# Patient Record
Sex: FEMALE | Race: WHITE
[De-identification: names, ages, dates, MRNs, and addresses within clinical notes are randomized per-mention and may not be internally consistent; named-entity substitution may affect disease eponyms.]

---

## 2017-01-20 ENCOUNTER — HOSPITAL ENCOUNTER (INPATIENT)
Dept: HOSPITAL 33 - ED | Age: 57
LOS: 5 days | Discharge: HOME | DRG: 193 | End: 2017-01-25
Attending: FAMILY MEDICINE | Admitting: FAMILY MEDICINE
Payer: MEDICAID

## 2017-01-20 DIAGNOSIS — F41.9: ICD-10-CM

## 2017-01-20 DIAGNOSIS — M19.90: ICD-10-CM

## 2017-01-20 DIAGNOSIS — J18.9: Primary | ICD-10-CM

## 2017-01-20 DIAGNOSIS — J96.21: ICD-10-CM

## 2017-01-20 DIAGNOSIS — D64.9: ICD-10-CM

## 2017-01-20 DIAGNOSIS — G40.909: ICD-10-CM

## 2017-01-20 DIAGNOSIS — J44.1: ICD-10-CM

## 2017-01-20 DIAGNOSIS — Z90.49: ICD-10-CM

## 2017-01-20 DIAGNOSIS — J90: ICD-10-CM

## 2017-01-20 DIAGNOSIS — N28.9: ICD-10-CM

## 2017-01-20 DIAGNOSIS — E66.01: ICD-10-CM

## 2017-01-20 DIAGNOSIS — Z79.899: ICD-10-CM

## 2017-01-20 DIAGNOSIS — F32.9: ICD-10-CM

## 2017-01-20 DIAGNOSIS — I10: ICD-10-CM

## 2017-01-20 LAB
ALBUMIN SERPL-MCNC: 3.2 G/DL (ref 3.4–5)
ALP SERPL-CCNC: 130 U/L (ref 46–116)
ALT SERPL-CCNC: 21 U/L (ref 12–78)
ANION GAP SERPL CALC-SCNC: 6.6 MEQ/L (ref 5–15)
APTT PPP: 31.8 SECONDS (ref 25.3–37)
ARTERIAL PATENCY WRIST A: YES
AST SERPL QL: 17 U/L (ref 15–37)
BASE EXCESS BLDA CALC-SCNC: 9 MMOL/L (ref -2–2)
BILIRUB BLD-MCNC: 0.4 MG/DL (ref 0.2–1)
BUN SERPL-MCNC: 8 MG/DL (ref 9–20)
CELLS COUNTED: 100
CHLORIDE SERPL-SCNC: 98 MEQ/L (ref 98–107)
CO2 SERPL-SCNC: 34.3 MEQ/L (ref 21–32)
GLUCOSE SERPL-MCNC: 150 MG/DL (ref 70–110)
INHALED O2 CONCENTRATION: 50 %
INR PPP: 0.98 (ref 0.8–3)
MCH RBC QN AUTO: 29.7 PG (ref 26–32)
O2 A-A PPRESDIFF RESPIRATORY: 212 MM[HG]
PLATELET # BLD AUTO: 395 K/MM3 (ref 150–450)
PO2 BLDA: 66 MMHG (ref 75–100)
POTASSIUM SERPLBLD-SCNC: 3.2 MEQ/L (ref 3.5–5.1)
PROT SERPL-MCNC: 7.8 GM/DL (ref 6.4–8.2)
PROTHROMBIN TIME: 11 SECONDS (ref 9.95–12.35)
RBC # BLD AUTO: 4 M/MM3 (ref 4.1–5.4)
SAO2 % BLDA: 93.9 % (ref 95–100)
SODIUM SERPL-SCNC: 136 MEQ/L (ref 136–145)
TROPONIN T SERPL HS-MCNC: < 0.017 NG/ML (ref 0–0.06)
WBC # BLD AUTO: 16.2 K/MM3 (ref 4–10.5)

## 2017-01-20 PROCEDURE — 71260 CT THORAX DX C+: CPT

## 2017-01-20 PROCEDURE — 85379 FIBRIN DEGRADATION QUANT: CPT

## 2017-01-20 PROCEDURE — 71010: CPT

## 2017-01-20 PROCEDURE — 80048 BASIC METABOLIC PNL TOTAL CA: CPT

## 2017-01-20 PROCEDURE — 83605 ASSAY OF LACTIC ACID: CPT

## 2017-01-20 PROCEDURE — 36000 PLACE NEEDLE IN VEIN: CPT

## 2017-01-20 PROCEDURE — 99284 EMERGENCY DEPT VISIT MOD MDM: CPT

## 2017-01-20 PROCEDURE — 96360 HYDRATION IV INFUSION INIT: CPT

## 2017-01-20 PROCEDURE — 36415 COLL VENOUS BLD VENIPUNCTURE: CPT

## 2017-01-20 PROCEDURE — 83880 ASSAY OF NATRIURETIC PEPTIDE: CPT

## 2017-01-20 PROCEDURE — 36600 WITHDRAWAL OF ARTERIAL BLOOD: CPT

## 2017-01-20 PROCEDURE — 82803 BLOOD GASES ANY COMBINATION: CPT

## 2017-01-20 PROCEDURE — 83036 HEMOGLOBIN GLYCOSYLATED A1C: CPT

## 2017-01-20 PROCEDURE — 85025 COMPLETE CBC W/AUTO DIFF WBC: CPT

## 2017-01-20 PROCEDURE — 96365 THER/PROPH/DIAG IV INF INIT: CPT

## 2017-01-20 PROCEDURE — 87040 BLOOD CULTURE FOR BACTERIA: CPT

## 2017-01-20 PROCEDURE — 96374 THER/PROPH/DIAG INJ IV PUSH: CPT

## 2017-01-20 PROCEDURE — 85610 PROTHROMBIN TIME: CPT

## 2017-01-20 PROCEDURE — 93005 ELECTROCARDIOGRAM TRACING: CPT

## 2017-01-20 PROCEDURE — 85730 THROMBOPLASTIN TIME PARTIAL: CPT

## 2017-01-20 PROCEDURE — 84484 ASSAY OF TROPONIN QUANT: CPT

## 2017-01-20 PROCEDURE — 94620: CPT

## 2017-01-20 PROCEDURE — 94760 N-INVAS EAR/PLS OXIMETRY 1: CPT

## 2017-01-20 PROCEDURE — 80053 COMPREHEN METABOLIC PANEL: CPT

## 2017-01-20 PROCEDURE — 94640 AIRWAY INHALATION TREATMENT: CPT

## 2017-01-20 PROCEDURE — 82375 ASSAY CARBOXYHB QUANT: CPT

## 2017-01-20 PROCEDURE — 85027 COMPLETE CBC AUTOMATED: CPT

## 2017-01-20 PROCEDURE — 82962 GLUCOSE BLOOD TEST: CPT

## 2017-01-20 PROCEDURE — 93306 TTE W/DOPPLER COMPLETE: CPT

## 2017-01-20 RX ADMIN — ALPRAZOLAM SCH MG: 1 TABLET ORAL at 23:28

## 2017-01-20 NOTE — ERPHSYRPT
- History of Present Illness


Source: patient, family


Timing/Duration: day(s) (few)


Severity: severe


Hx Tetanus, Diphtheria Vaccination/Date Given: Yes


Hx Influenza Vaccination/Date Given: Yes


Hx Pneumococcal Vaccination/Date Given: No





<MONSE LAMBERT - Last Filed: 01/20/17 19:21>





<BETHERI TERRYSH - Last Filed: 01/20/17 20:11>





- History of Present Illness


Time Seen by Provider: 01/20/17 18:12


Physician History: 





CC: short of air


Hx: 57 y/o patient of dr Vijay Wong and JA Palacios. She has couple day hx of 

increased dyspnea. Some cough. No fever or chills. She has hx of COPD. No hx of 

thromboembolic disease. No hx of CA or recent travel. Denies chest pain. 

Dyspnea worsened so came to ER. She used to use oxygen at home but no longer. 

She used neb PTA.


 (MONSE LAMBERT)


Allergies/Adverse Reactions: 








No Known Drug Allergies Allergy (Verified 10/06/16 09:43)


 





Home Medications: 








Escitalopram Oxalate 10 mg** [Lexapro 10 MG**] 10 mg PO DAILY 12/17/15 [History]


Gabapentin 300 mg PO BID 12/17/15 [History]


Simethicone 80 mg*** [Mylicon 80MG***] 80 mg PO QID 12/17/15 [History]


Amlodipine Besylate 10 mg [Norvasc 10 MG] 10 mg PO DAILY 07/13/16 [History]


Budesonide/Formoterol Fumarate [Symbicort 160-4.5 Mcg Inhaler] 6 gm IH BID 07/13 /16 [History]


Ergocalciferol (Vitamin D2) [Vitamin D] 50,000 unit PO DAILY 07/13/16 [History]


Ferrous Sulfate 325 mg PO BID 07/13/16 [History]


Furosemide [Lasix] 40 mg PO DAILY 07/13/16 [History]


Lisinopril [Zestril] 40 mg PO DAILY 07/13/16 [History]


Omeprazole 20 MG [Prilosec 20 mg] 20 mg PO DAILY 07/13/16 [History]








- Review of Systems


Constitutional: Fatigue, Malaise, Weakness, No Fever, No Chills


Eyes: No Symptoms


Ears, Nose, & Throat: No Symptoms


Respiratory: Cough, Dyspnea, Dyspnea on Exertion (EARLY)


Cardiac: No Chest Pain, No Edema


Abdominal/Gastrointestinal: No Abdominal Pain, No Nausea, No Vomiting


Genitourinary Symptoms: No Dysuria


Musculoskeletal: No Back Pain


Skin: No Rash


Neurological: No Headache


All Other Systems: Reviewed and Negative





<MONSE LAMBERT Last Filed: 01/20/17 19:21>





- Past Medical History


Pertinent Past Medical History: Yes


Neurological History: Seizures


ENT History: No Pertinent History


Cardiac History: No Pertinent History, Hypertension


Respiratory History: COPD


Endocrine Medical History: No Pertinent History


Musculoskeletal History: Osteoarthritis


GI Medical History: Gallbladder Disease, Hernia, Other


 History: Renal Disease


Psycho-Social History: Anxiety, Depression


Female Reproductive Disorders: No Pertinent History


Other Medical History: PERF BOWEL 4/2014. pt states she had a seizure because 

she ran out of xanax, pt states she does not remember it, [facility].  Pt states that her kidneys leak protein.





- Past Surgical History


Past Surgical History: Yes


Neuro Surgical History: No Pertinent History


Cardiac: No Pertinent History


Respiratory: No Pertinent History


Gastrointestinal: Cholecystectomy, Colon Resection


Genitourinary: No Pertinent History


Musculoskeletal: No Pertinent History


Female Surgical History: No Pertinent History





- Social History


Smoking Status: Former smoker


How long have you smoked: 30


Exposure to second hand smoke: No


Drug Use: none


Patient Lives Alone: No





- Female History


Hx Pregnant Now: No





<LAMBERTMONSE Last Filed: 01/20/17 19:21>





- Physical Exam


General Appearance: alert, obese, other (dyspneic)


Eye Exam: PERRL/EOMI


Ears, Nose, Throat Exam: normal ENT inspection, moist mucous membranes


Neck Exam: normal inspection, non-tender, supple


Respiratory Exam: diminished breath sounds, wheezing (rare)


Cardiovascular Exam: regular rate/rhythm, No murmur


Gastrointestinal/Abdomen Exam: soft, other (ventral hernia), No tenderness, No 

distention


Back Exam: normal inspection


Extremity Exam: normal inspection, normal range of motion, No calf tenderness, 

No pedal edema


Neurologic Exam: alert, oriented x 3, cooperative


Skin Exam: warm, dry, No rash


**SpO2 Interpretation**: hypoxic, ABG ordered, O2 applied


SpO2: 77


Oxygen Delivery: Room Air





<LAMBERTMONSEFLEX - Last Filed: 01/20/17 19:21>





- Course


Nursing assessment & vital signs reviewed: Yes


EKG Interpreted by Me: RATE (75), Sinus Rhythm, NORMAL AXIS, NORMAL INTERVALS (

QTc 331), Non-specific ST Changes





<MONSE LAMBERT - Last Filed: 01/20/17 19:21>





<MONSE LAMBERT - Last Filed: 01/20/17 19:21>





- Progress


Progress: improved


Discussed with .: Alonso


Will see patient in: hospital (observation)


Counseled pt/family regarding: lab results, diagnosis, need for follow-up, rad 

results





<ELIF CAMPOS - Last Filed: 01/20/17 20:11>





- Progress


Progress Note: 





01/20/17 19:21


Pt improved. She is weaning oxygen. She will have CTA to rule out PE. Cultures 

sent. Nebs, steroids and abtx given. Reported to Dr Campos for further care 

and disposition. (MONSE LAMBERT)





<MONSE LAMBERT - Last Filed: 01/20/17 19:21>





- Departure


Time of Disposition: 20:09


Departure Disposition: In-patient Admission


Critical Care Time: Yes


Critical Care Time(excluding separately billable procedures): 30-74 minutes





<ELIF CAMPOS - Last Filed: 01/20/17 20:11>





- Departure


Clinical Impression: 


 Acute exacerbation of chronic obstructive airways disease, Acute on chronic 

respiratory failure with hypoxemia





Pneumonia


Qualifiers:


 Pneumonia type: due to other aerobic Gram-negative bacteria Laterality: 

bilateral Lung location: lower lobe of lung Qualified Code(s): J15.6 - 

Pneumonia due to other aerobic Gram-negative bacteria





Condition: Fair


Referrals: 


DOMINICK WONG [Primary Care Provider] - 


Instructions:  Chronic Obstructive Pulmonary Disease

## 2017-01-20 NOTE — XRAY
Indication: Dyspnea.



Comparison: April 24, 2014



Portable chest again markedly limited by patient body habitus.  Lungs are also

less inflated accentuating the cardiopulmonary structures.  Diffuse bilateral

hazy opacities either infiltrates versus atelectasis that should be correlated

clinically.  No pneumothorax.  CT may yield further information if clinically

warranted.

## 2017-01-21 RX ADMIN — WATER SCH MG: 1 INJECTION INTRAMUSCULAR; INTRAVENOUS; SUBCUTANEOUS at 16:11

## 2017-01-21 RX ADMIN — IPRATROPIUM BROMIDE AND ALBUTEROL SULFATE SCH ML: .5; 3 SOLUTION RESPIRATORY (INHALATION) at 19:10

## 2017-01-21 RX ADMIN — HYDROCHLOROTHIAZIDE SCH MG: 25 TABLET ORAL at 16:10

## 2017-01-21 RX ADMIN — FLUTICASONE PROPIONATE AND SALMETEROL XINAFOATE SCH PUFF: 230; 21 AEROSOL, METERED RESPIRATORY (INHALATION) at 19:11

## 2017-01-21 RX ADMIN — WATER SCH MG: 1 INJECTION INTRAMUSCULAR; INTRAVENOUS; SUBCUTANEOUS at 10:10

## 2017-01-21 RX ADMIN — FERROUS SULFATE TAB 325 MG (65 MG ELEMENTAL FE) SCH MG: 325 (65 FE) TAB at 21:34

## 2017-01-21 RX ADMIN — IPRATROPIUM BROMIDE AND ALBUTEROL SULFATE SCH ML: .5; 3 SOLUTION RESPIRATORY (INHALATION) at 14:32

## 2017-01-21 RX ADMIN — GABAPENTIN SCH MG: 300 CAPSULE ORAL at 21:35

## 2017-01-21 RX ADMIN — IPRATROPIUM BROMIDE AND ALBUTEROL SULFATE SCH ML: .5; 3 SOLUTION RESPIRATORY (INHALATION) at 01:28

## 2017-01-21 RX ADMIN — FLUTICASONE PROPIONATE AND SALMETEROL XINAFOATE SCH PUFF: 230; 21 AEROSOL, METERED RESPIRATORY (INHALATION) at 07:08

## 2017-01-21 RX ADMIN — ALPRAZOLAM SCH MG: 1 TABLET ORAL at 13:26

## 2017-01-21 RX ADMIN — ZOLPIDEM TARTRATE PRN MG: 5 TABLET ORAL at 21:36

## 2017-01-21 RX ADMIN — CEFTRIAXONE SCH MLS/HR: 1 INJECTION, SOLUTION INTRAVENOUS at 21:35

## 2017-01-21 RX ADMIN — ALPRAZOLAM SCH MG: 1 TABLET ORAL at 21:35

## 2017-01-21 RX ADMIN — GABAPENTIN SCH MG: 300 CAPSULE ORAL at 16:10

## 2017-01-21 RX ADMIN — SIMETHICONE SCH MG: 80 TABLET, CHEWABLE ORAL at 21:34

## 2017-01-21 RX ADMIN — ACETAMINOPHEN PRN MG: 325 TABLET ORAL at 21:35

## 2017-01-21 RX ADMIN — SIMETHICONE SCH MG: 80 TABLET, CHEWABLE ORAL at 16:10

## 2017-01-21 RX ADMIN — AMLODIPINE BESYLATE SCH MG: 5 TABLET ORAL at 16:10

## 2017-01-21 RX ADMIN — ALPRAZOLAM SCH MG: 1 TABLET ORAL at 09:44

## 2017-01-21 RX ADMIN — ESCITALOPRAM OXALATE SCH MG: 10 TABLET ORAL at 16:10

## 2017-01-21 RX ADMIN — ENOXAPARIN SODIUM SCH MG: 100 INJECTION SUBCUTANEOUS at 10:10

## 2017-01-21 RX ADMIN — ACETAMINOPHEN PRN MG: 325 TABLET ORAL at 10:10

## 2017-01-21 RX ADMIN — IPRATROPIUM BROMIDE AND ALBUTEROL SULFATE SCH ML: .5; 3 SOLUTION RESPIRATORY (INHALATION) at 07:07

## 2017-01-21 RX ADMIN — AZITHROMYCIN DIHYDRATE SCH MLS/HR: 500 INJECTION, POWDER, LYOPHILIZED, FOR SOLUTION INTRAVENOUS at 22:12

## 2017-01-21 NOTE — XRAY
Indication: Short of breath, cough, and elevated WBC.  Elevated d-dimer.



Multiple contiguous axial images obtained through the chest using 80 cc

Isovue-370 contrast and PE protocol.



Comparison: Conventional CT chest with contrast exam of December 16, 2015.



There is adequate opacification of the pulmonary arteries.  Evaluation for

pulmonary embolus distal to the lobar branches limited due to body habitus.

No filling defect or pulmonary embolus in the more central pulmonary arteries.

 The heart is now enlarged.  No pericardial effusion/thickening.  Aorta again

minimally calcified without aneurysm/dissection.  Again a few small

mediastinal nodes, largest right subcarinal measuring 11 x 21 mm.



Examination of the lung parenchyma demonstrates increasing small bilateral

effusions and bibasilar dependent atelectasis.  Also new diffuse infiltrates

throughout the left upper and left lower lobes with lesser degree in the right

upper lobe.



Bony thorax intact with minimal degenerative changes throughout the spine and

old right rib fractures.



Limited upper abdomen again demonstrates fatty liver and 12 cm splenomegaly.



Impression:

1.  Pulmonary embolus evaluation limited due to body habitus.  No central

pulmonary embolus.

2.  New cardiomegaly with bilateral pleural effusions.  Rule out cardiac

decompensation.

3.  New bilateral infiltrates, left greater than right favoring pneumonia.

4.  Stable fatty liver and splenomegaly.



CTDI 23.69

## 2017-01-21 NOTE — PCM.HP
History of Present Illness





- Chief Complaint


Chief Complaint: Shortness of Breath


History of Present Illness: 


 is a 56 year old female who presented with shortness of breath, she 

hasn't had much cough, no fever, no swelling in her extremities. She has a 

history of copd, ct scan showed pneumonia in ER, no PE.








- Review of Systems


Constitutional: No Fever, No Chills


Respiratory: Short Of Breath, No Cough


Cardiac: No Chest Pain, No Edema, No Syncope


Abdominal/Gastrointestinal: No Abdominal Pain, No Nausea, No Vomiting, No 

Diarrhea


All Other Systems: Reviewed and Negative





Medications & Allergies


Home Medications: 


 Home Medication List





Escitalopram Oxalate 10 mg** [Lexapro 10 MG**] 10 mg PO DAILY 12/17/15 [History 

Confirmed 10/06/16]


Gabapentin 300 mg PO BID 12/17/15 [History Confirmed 10/06/16]


Simethicone 80 mg*** [Mylicon 80MG***] 80 mg PO QID 12/17/15 [History Confirmed 

10/06/16]


Alprazolam 1 mg*** [Xanax 1 mg***] 1 mg PO TID #90 tablet 12/18/15 [Rx 

Confirmed 10/06/16]


Amlodipine Besylate 10 mg [Norvasc 10 MG] 10 mg PO DAILY 07/13/16 [History 

Confirmed 10/06/16]


Budesonide/Formoterol Fumarate [Symbicort 160-4.5 Mcg Inhaler] 6 gm IH BID 07/13 /16 [History Confirmed 10/06/16]


Ergocalciferol (Vitamin D2) [Vitamin D] 50,000 unit PO DAILY 07/13/16 [History 

Confirmed 10/06/16]


Ferrous Sulfate 325 mg PO BID 07/13/16 [History Confirmed 10/06/16]


Furosemide [Lasix] 40 mg PO DAILY 07/13/16 [History Confirmed 10/06/16]


Lisinopril [Zestril] 40 mg PO DAILY 07/13/16 [History Confirmed 10/06/16]


Omeprazole 20 MG [Prilosec 20 mg] 20 mg PO DAILY 07/13/16 [History Confirmed 10/

06/16]


Potassium Chloride 10 Meq Tab* [Klor Con 10 MEQ***] 10 meq PO QID #120  07/15/

16 [Rx Confirmed 07/13/16]








Allergies/Adverse Reactions: 


 Allergies











Allergy/AdvReac Type Severity Reaction Status Date / Time


 


No Known Drug Allergies Allergy   Verified 10/06/16 09:43














- Past Medical History


Past Medical History: Yes


Neurological History: Seizures


ENT History: No Pertinent History


Cardiac History: No Pertinent History, Hypertension


Respiratory History: COPD


Endocrine Medical History: No Pertinent History


Musculoskelatal History: Osteoarthritis


GI Medical History: Gallbladder Disease, Hernia, Other


 History: Renal Disease


Pyscho-Social History: Anxiety, Depression


Reproductive Disorders: No Pertinent History


Comment: PERF BOWEL 4/2014. pt states she had a seizure because she ran out of 

xanax, pt states she does not remember it, [facility]  

Pt states that her kidneys leak protein.





- Female History


Are you pregnant now?: No





- Past Surgical History


Past Surgical History: Yes


Neuro Surgical History: No Pertinent History


Cardiac History: No Pertinent History


Respiratory Surgery: No Pertinent History


GI Surgical History: Cholecystectomy, Colon Resection


Genitourinary Surgical Hx: No Pertinent History


Musculskeletal Surgical Hx: No Pertinent History


Female Surgical History: No Pertinent History





- Social History


Smoking Status: Former smoker


How long have you smoked: 30


Exposure to second hand smoke: No


Alcohol: None


Drug Use: none





- Physical Exam


Vital Signs: 


 Vital Signs - 24 hr











  Temp Pulse Resp BP Pulse Ox


 


 01/21/17 07:23  98.7 F  80  20  128/74  96


 


 01/21/17 07:00   84  24   83 L


 


 01/21/17 04:00  98.8 F  76  20  131/77  92 L


 


 01/21/17 01:32   73  24   92 L


 


 01/21/17 00:00  98.6 F  88  24  141/76  93 L


 


 01/20/17 22:00  98.3 F  82  24  156/71  92 L


 


 01/20/17 21:58   72  22   92 L


 


 01/20/17 20:15   75  22  152/130  92 L


 


 01/20/17 19:22      77 L


 


 01/20/17 19:18   75  26 H  137/59  93 L


 


 01/20/17 18:34   76  28 H   92 L


 


 01/20/17 18:25      77 L


 


 01/20/17 18:11   90  28 H  126/62  94 L








 Oxygen-Last 24 hours











O2 Percentage                  5 Liters = 40%


 


O2 Percentage                  5 Liters = 40%


 


O2 Percentage                  5 Liters = 40%


 


O2 Percentage                  5 Liters = 40%


 


O2 Percentage                  5 Liters = 40%


 


O2 Percentage                  35%


 


O2 Percentage                  50%


 


O2 Percentage                  50%














General Appearance: no apparent distress, alert, obese


Respiratory Exam: crackles/rales


Cardiovascular Exam: regular rate/rhythm, normal heart sounds, normal 

peripheral pulses


Gastrointestinal/Abdomen Exam: soft, normal bowel sounds, No tenderness, No mass


Extremity Exam: normal inspection, normal range of motion, pedal edema, swelling


Skin Exam: normal color, warm, dry, No rash





Results





- Other Procedures and Tests


 Respiratory Therapy





01/20/17 22:18


Oxygen NASAL CANNULA 2 lpm 





01/20/17 22:19


Respiratory MDI BID 





01/20/17 22:20


neb [Respiratory Nebulizer] Q6H 














Assessment/Plan


(1) Acute exacerbation of chronic obstructive airways disease


Current Visit: Yes   Status: Acute   


Assessment & Plan: 


continue IV steroids and rocephin/zithromax


Code(s): J44.1 - CHRONIC OBSTRUCTIVE PULMONARY DISEASE W (ACUTE) EXACERBATION   





(2) Pneumonia


Current Visit: Yes   Status: Acute   


Qualifiers: 


   Pneumonia type: due to other aerobic Gram-negative bacteria   Laterality: 

bilateral   Lung location: lower lobe of lung   Qualified Code(s): J15.6 - 

Pneumonia due to other aerobic Gram-negative bacteria   


Assessment & Plan: 


continue rocephin and zithromax


Code(s): J18.9 - PNEUMONIA, UNSPECIFIED ORGANISM

## 2017-01-22 LAB
ANION GAP SERPL CALC-SCNC: 11.2 MEQ/L (ref 5–15)
BASO STIPL BLD QL SMEAR: (no result)
BUN SERPL-MCNC: 13 MG/DL (ref 9–20)
CELLS COUNTED: 100
CHLORIDE SERPL-SCNC: 98 MEQ/L (ref 98–107)
CO2 SERPL-SCNC: 32.1 MEQ/L (ref 21–32)
GLUCOSE SERPL-MCNC: 332 MG/DL (ref 70–110)
MCH RBC QN AUTO: 29.4 PG (ref 26–32)
NEUTS BAND # BLD MANUAL: 3 % (ref 0–2)
PLATELET # BLD AUTO: 401 K/MM3 (ref 150–450)
POLYCHROMASIA BLD QL SMEAR: (no result)
POTASSIUM SERPLBLD-SCNC: 3.8 MEQ/L (ref 3.5–5.1)
RBC # BLD AUTO: 3.63 M/MM3 (ref 4.1–5.4)
SODIUM SERPL-SCNC: 138 MEQ/L (ref 136–145)
WBC # BLD AUTO: 17.1 K/MM3 (ref 4–10.5)

## 2017-01-22 RX ADMIN — WATER SCH MG: 1 INJECTION INTRAMUSCULAR; INTRAVENOUS; SUBCUTANEOUS at 17:07

## 2017-01-22 RX ADMIN — FLUTICASONE PROPIONATE AND SALMETEROL XINAFOATE SCH PUFF: 230; 21 AEROSOL, METERED RESPIRATORY (INHALATION) at 19:13

## 2017-01-22 RX ADMIN — ACETAMINOPHEN PRN MG: 325 TABLET ORAL at 19:57

## 2017-01-22 RX ADMIN — IPRATROPIUM BROMIDE AND ALBUTEROL SULFATE SCH ML: .5; 3 SOLUTION RESPIRATORY (INHALATION) at 12:46

## 2017-01-22 RX ADMIN — SIMETHICONE SCH MG: 80 TABLET, CHEWABLE ORAL at 13:28

## 2017-01-22 RX ADMIN — SIMETHICONE SCH MG: 80 TABLET, CHEWABLE ORAL at 17:07

## 2017-01-22 RX ADMIN — WATER SCH MG: 1 INJECTION INTRAMUSCULAR; INTRAVENOUS; SUBCUTANEOUS at 01:01

## 2017-01-22 RX ADMIN — ALPRAZOLAM SCH MG: 1 TABLET ORAL at 06:13

## 2017-01-22 RX ADMIN — IPRATROPIUM BROMIDE AND ALBUTEROL SULFATE SCH ML: .5; 3 SOLUTION RESPIRATORY (INHALATION) at 00:28

## 2017-01-22 RX ADMIN — ESCITALOPRAM OXALATE SCH MG: 10 TABLET ORAL at 09:04

## 2017-01-22 RX ADMIN — IPRATROPIUM BROMIDE AND ALBUTEROL SULFATE SCH ML: .5; 3 SOLUTION RESPIRATORY (INHALATION) at 05:56

## 2017-01-22 RX ADMIN — SIMETHICONE SCH MG: 80 TABLET, CHEWABLE ORAL at 09:07

## 2017-01-22 RX ADMIN — CEFTRIAXONE SCH MLS/HR: 1 INJECTION, SOLUTION INTRAVENOUS at 21:29

## 2017-01-22 RX ADMIN — FERROUS SULFATE TAB 325 MG (65 MG ELEMENTAL FE) SCH MG: 325 (65 FE) TAB at 09:04

## 2017-01-22 RX ADMIN — GABAPENTIN SCH MG: 300 CAPSULE ORAL at 21:30

## 2017-01-22 RX ADMIN — HYDROCHLOROTHIAZIDE SCH MG: 25 TABLET ORAL at 09:05

## 2017-01-22 RX ADMIN — SIMETHICONE SCH MG: 80 TABLET, CHEWABLE ORAL at 21:30

## 2017-01-22 RX ADMIN — ZOLPIDEM TARTRATE PRN MG: 5 TABLET ORAL at 21:32

## 2017-01-22 RX ADMIN — GABAPENTIN SCH MG: 300 CAPSULE ORAL at 09:04

## 2017-01-22 RX ADMIN — AZITHROMYCIN DIHYDRATE SCH MLS/HR: 500 INJECTION, POWDER, LYOPHILIZED, FOR SOLUTION INTRAVENOUS at 22:08

## 2017-01-22 RX ADMIN — ALPRAZOLAM SCH MG: 1 TABLET ORAL at 21:30

## 2017-01-22 RX ADMIN — FERROUS SULFATE TAB 325 MG (65 MG ELEMENTAL FE) SCH MG: 325 (65 FE) TAB at 21:30

## 2017-01-22 RX ADMIN — ENOXAPARIN SODIUM SCH MG: 100 INJECTION SUBCUTANEOUS at 09:03

## 2017-01-22 RX ADMIN — AMLODIPINE BESYLATE SCH MG: 5 TABLET ORAL at 09:04

## 2017-01-22 RX ADMIN — FLUTICASONE PROPIONATE AND SALMETEROL XINAFOATE SCH PUFF: 230; 21 AEROSOL, METERED RESPIRATORY (INHALATION) at 05:58

## 2017-01-22 RX ADMIN — WATER SCH MG: 1 INJECTION INTRAMUSCULAR; INTRAVENOUS; SUBCUTANEOUS at 09:04

## 2017-01-22 RX ADMIN — IPRATROPIUM BROMIDE AND ALBUTEROL SULFATE SCH ML: .5; 3 SOLUTION RESPIRATORY (INHALATION) at 19:13

## 2017-01-22 RX ADMIN — ALPRAZOLAM SCH MG: 1 TABLET ORAL at 13:28

## 2017-01-22 NOTE — PCM.NOTE
Date and Time: 01/22/17 0729





Subjective Assessment: 





patient feeling a little better, seems like right lung is opening up and she is 

bringing up more sputum.





Objective Exam


General Appearance: obese


Skin Exam: normal color, warm, dry


Respiratory Exam: prolonged expirations, crackles/rales


Cardiovascular Exam: regular rate/rhythm, normal heart sounds


Gastrointestinal/Abdomen Exam: soft, No tenderness, No mass


Extremity Exam: normal inspection, normal range of motion





OBJECTIVE DATA


Vital Signs: 


 Vital Signs - 24 hr











  Temp Pulse Resp BP Pulse Ox


 


 01/22/17 07:15  97.8 F  81  22  106/58  96


 


 01/22/17 05:59   82  24   93 L


 


 01/22/17 04:00  97.6 F  83  26 H  123/58  95


 


 01/22/17 00:31   80  20   94 L


 


 01/22/17 00:00  97.8 F  80  20  100/51  97


 


 01/21/17 20:00  98.1 F  85  22  129/73  95


 


 01/21/17 19:00   85  22   95


 


 01/21/17 15:44  97.8 F  70  18  118/68  94 L


 


 01/21/17 13:00   73  18   95


 


 01/21/17 12:18  97.7 F  78  22  115/63  96








 Oxygen-Last 24 hours











O2 Percentage                  5 Liters = 40%


 


O2 Percentage                  5 Liters = 40%


 


O2 Percentage                  5 Liters = 40%


 


O2 Percentage                  5 Liters = 40%


 


O2 Percentage                  5 Liters = 40%











 Pain Assessment - Last Documented











Pain Intensity                 2


 


Pain Scale Used                0-10 Pain Scale











Intake and Output: 


 Intake & Output











 01/19/17 01/20/17 01/21/17 01/22/17





 11:59 11:59 11:59 11:59


 


Intake Total   1785 1740


 


Balance   1785 1740


 


Weight   117.843 kg 











Lab Results: 


 Lab Results-Last 24 Hours











  01/22/17 01/22/17 Range/Units





  04:30 04:30 


 


WBC  17.1 H   (4.0-10.5)  K/mm3


 


RBC  3.63 L   (4.1-5.4)  M/mm3


 


Hgb  10.7 L   (12.0-16.0)  gm/dl


 


Hct  36.5   (35-47)  %


 


MCV  100.6 H   ()  fl


 


MCH  29.4   (26-32)  pg


 


MCHC  29.3 L   (32-36)  g/dl


 


RDW  14.5 H   (11.5-14.0)  %


 


Plt Count  401   (150-450)  K/mm3


 


MPV  9.7 H   (6-9.5)  fl


 


Sodium   138  (136-145)  mEq/L


 


Potassium   3.8  (3.5-5.1)  mEq/L


 


Chloride   98  ()  mEq/L


 


Carbon Dioxide   32.1 H  (21-32)  mEq/L


 


Anion Gap   11.2  (5-15)  MEQ/L


 


BUN   13  (9-20)  mg/dL


 


Creatinine   1.10  (0.55-1.30)  mg/dl


 


Estimated GFR   54  ML/MIN


 


Glucose   332 H  ()  MG/DL


 


Calcium   8.5  (8.5-10.1)  mg/dL


 


NT-Pro-B Natriuret Pep   1886 H  (0-125)  pg/ml











Multi-Disciplinary Progress Notes: 


 Multi-Disciplinary Progress Notes





01/21/17 08:46 Case Management Note by Kiya Beth





DISCHARGE PLAN REVIEWED.  NORMALLY LIVES AT HOME WITH ONE GROWN CHILD IN THE 

HOME, INDEPENDENT OF ALL ADL'S.  PT HAS A WALKER, NO OTHER DME'S.  PLAN TO 

RETURN HOME TO PRE EPISODIC LEVEL OF FUNCTION.  WILL CONTINUE TO MONITOR FOR 

ALL DISCHARGE NEEDS.  





Initialized on 01/21/17 08:46 - END OF NOTE

















Assessment/Plan


(1) Acute exacerbation of chronic obstructive airways disease


Current Visit: Yes   Status: Acute   


Assessment & Plan: 


continue IV solu medrol and abx. improving at this time


Code(s): J44.1 - CHRONIC OBSTRUCTIVE PULMONARY DISEASE W (ACUTE) EXACERBATION   





(2) Pneumonia


Current Visit: Yes   Status: Acute   


Qualifiers: 


   Pneumonia type: due to other aerobic Gram-negative bacteria   Laterality: 

bilateral   Lung location: lower lobe of lung   Qualified Code(s): J15.6 - 

Pneumonia due to other aerobic Gram-negative bacteria   


Code(s): J18.9 - PNEUMONIA, UNSPECIFIED ORGANISM

## 2017-01-23 LAB
ANION GAP SERPL CALC-SCNC: 8.3 MEQ/L (ref 5–15)
BUN SERPL-MCNC: 16 MG/DL (ref 9–20)
CELLS COUNTED: 100
CHLORIDE SERPL-SCNC: 98 MEQ/L (ref 98–107)
CO2 SERPL-SCNC: 35.3 MEQ/L (ref 21–32)
GLUCOSE SERPL-MCNC: 370 MG/DL (ref 70–110)
MCH RBC QN AUTO: 29.7 PG (ref 26–32)
PLATELET # BLD AUTO: 352 K/MM3 (ref 150–450)
POTASSIUM SERPLBLD-SCNC: 4.1 MEQ/L (ref 3.5–5.1)
RBC # BLD AUTO: 3.56 M/MM3 (ref 4.1–5.4)
SODIUM SERPL-SCNC: 138 MEQ/L (ref 136–145)
WBC # BLD AUTO: 14.1 K/MM3 (ref 4–10.5)

## 2017-01-23 RX ADMIN — POTASSIUM CHLORIDE SCH MEQ: 10 TABLET, EXTENDED RELEASE ORAL at 08:35

## 2017-01-23 RX ADMIN — ENOXAPARIN SODIUM SCH MG: 100 INJECTION SUBCUTANEOUS at 08:33

## 2017-01-23 RX ADMIN — FERROUS SULFATE TAB 325 MG (65 MG ELEMENTAL FE) SCH MG: 325 (65 FE) TAB at 21:34

## 2017-01-23 RX ADMIN — ACETAMINOPHEN PRN MG: 325 TABLET ORAL at 21:34

## 2017-01-23 RX ADMIN — FUROSEMIDE SCH MG: 40 TABLET ORAL at 08:35

## 2017-01-23 RX ADMIN — ALPRAZOLAM SCH MG: 1 TABLET ORAL at 13:47

## 2017-01-23 RX ADMIN — CEFTRIAXONE SCH MLS/HR: 1 INJECTION, SOLUTION INTRAVENOUS at 21:35

## 2017-01-23 RX ADMIN — GABAPENTIN SCH MG: 300 CAPSULE ORAL at 08:36

## 2017-01-23 RX ADMIN — ESCITALOPRAM OXALATE SCH MG: 10 TABLET ORAL at 08:35

## 2017-01-23 RX ADMIN — SIMETHICONE SCH MG: 80 TABLET, CHEWABLE ORAL at 16:49

## 2017-01-23 RX ADMIN — SIMETHICONE SCH MG: 80 TABLET, CHEWABLE ORAL at 13:47

## 2017-01-23 RX ADMIN — GABAPENTIN SCH MG: 300 CAPSULE ORAL at 21:34

## 2017-01-23 RX ADMIN — IPRATROPIUM BROMIDE AND ALBUTEROL SULFATE SCH ML: .5; 3 SOLUTION RESPIRATORY (INHALATION) at 12:44

## 2017-01-23 RX ADMIN — INSULIN ASPART PRN UNIT: 100 INJECTION, SOLUTION INTRAVENOUS; SUBCUTANEOUS at 11:22

## 2017-01-23 RX ADMIN — ALPRAZOLAM SCH MG: 1 TABLET ORAL at 21:34

## 2017-01-23 RX ADMIN — IPRATROPIUM BROMIDE AND ALBUTEROL SULFATE SCH ML: .5; 3 SOLUTION RESPIRATORY (INHALATION) at 01:46

## 2017-01-23 RX ADMIN — INSULIN ASPART PRN UNIT: 100 INJECTION, SOLUTION INTRAVENOUS; SUBCUTANEOUS at 16:49

## 2017-01-23 RX ADMIN — ACETAMINOPHEN PRN MG: 325 TABLET ORAL at 06:29

## 2017-01-23 RX ADMIN — WATER SCH MG: 1 INJECTION INTRAMUSCULAR; INTRAVENOUS; SUBCUTANEOUS at 01:00

## 2017-01-23 RX ADMIN — SIMETHICONE SCH MG: 80 TABLET, CHEWABLE ORAL at 08:36

## 2017-01-23 RX ADMIN — IPRATROPIUM BROMIDE AND ALBUTEROL SULFATE SCH ML: .5; 3 SOLUTION RESPIRATORY (INHALATION) at 06:17

## 2017-01-23 RX ADMIN — FLUTICASONE PROPIONATE AND SALMETEROL XINAFOATE SCH PUFF: 230; 21 AEROSOL, METERED RESPIRATORY (INHALATION) at 06:17

## 2017-01-23 RX ADMIN — INSULIN ASPART PRN UNIT: 100 INJECTION, SOLUTION INTRAVENOUS; SUBCUTANEOUS at 22:25

## 2017-01-23 RX ADMIN — FERROUS SULFATE TAB 325 MG (65 MG ELEMENTAL FE) SCH MG: 325 (65 FE) TAB at 08:33

## 2017-01-23 RX ADMIN — HYDROCHLOROTHIAZIDE SCH MG: 25 TABLET ORAL at 08:36

## 2017-01-23 RX ADMIN — ALPRAZOLAM SCH MG: 1 TABLET ORAL at 06:24

## 2017-01-23 RX ADMIN — SIMETHICONE SCH MG: 80 TABLET, CHEWABLE ORAL at 21:34

## 2017-01-23 RX ADMIN — ZOLPIDEM TARTRATE PRN MG: 5 TABLET ORAL at 21:43

## 2017-01-23 RX ADMIN — AZITHROMYCIN DIHYDRATE SCH MLS/HR: 500 INJECTION, POWDER, LYOPHILIZED, FOR SOLUTION INTRAVENOUS at 22:26

## 2017-01-23 RX ADMIN — FLUTICASONE PROPIONATE AND SALMETEROL XINAFOATE SCH PUFF: 230; 21 AEROSOL, METERED RESPIRATORY (INHALATION) at 18:48

## 2017-01-23 RX ADMIN — IPRATROPIUM BROMIDE AND ALBUTEROL SULFATE SCH ML: .5; 3 SOLUTION RESPIRATORY (INHALATION) at 18:48

## 2017-01-23 NOTE — PCM.NOTE
Date and Time: 01/23/17 0813





Subjective Assessment: 





Very short of breath currently with just ambulating from the restroom.


She reports no previous known heart disease but does state she was taking lasix 

prior to coming to the hospital every day. She was being treated for c. diff in 

the recent past as well. 





Objective Exam


General Appearance: mild distress, obese, other (short of breath after 

ambulation)


Neurologic Exam: alert, oriented x 3


Skin Exam: warm, dry


Ears, Nose, Throat Exam: moist mucous membranes


Neck Exam: non-tender, supple


Respiratory Exam: other (limited by obesity no wheezing minimal rales throughout

)


Cardiovascular Exam: regular rate/rhythm, other (limited by obesity)


Gastrointestinal/Abdomen Exam: soft, normal bowel sounds, No tenderness


Extremity Exam: No calf tenderness, No pedal edema





OBJECTIVE DATA


Vital Signs: 


 Vital Signs - 24 hr











  Temp Pulse Resp BP Pulse Ox


 


 01/23/17 07:15  97.7 F  76  19  99/49  96


 


 01/23/17 06:18   84  18   93 L


 


 01/23/17 04:00  98.4 F  86  18  115/59  93 L


 


 01/23/17 01:46   75  22   94 L


 


 01/22/17 23:42  98.5 F  82  24  123/58  95


 


 01/22/17 20:00  97.8 F  78  24  136/64  94 L


 


 01/22/17 19:14   75  18   93 L


 


 01/22/17 16:18  98 F  70  20  132/66  96


 


 01/22/17 12:41  97.7 F  90  20  130/60  94 L


 


 01/22/17 11:00   88  20   97








 Oxygen-Last 24 hours











O2 Percentage                  5 Liters = 40%


 


O2 Percentage                  5 Liters = 40%


 


O2 Percentage                  5 Liters = 40%


 


O2 Percentage                  5 Liters = 40%


 


O2 Percentage                  5 Liters = 40%


 


O2 Percentage                  5 Liters = 40%











 Pain Assessment - Last Documented











Pain Intensity                 5


 


Pain Scale Used                FLACC











Intake and Output: 


 Intake & Output











 01/20/17 01/21/17 01/22/17 01/23/17





 11:59 11:59 11:59 11:59


 


Intake Total  1785 1980 1860


 


Output Total   200 


 


Balance  1785 1780 1860


 


Weight  117.843 kg  











Lab Results: 


 Lab Results-Last 24 Hours











  01/22/17 01/23/17 01/23/17 Range/Units





  04:30 05:40 05:40 


 


WBC  17.1 H  14.1 H   (4.0-10.5)  K/mm3


 


RBC  3.63 L  3.56 L   (4.1-5.4)  M/mm3


 


Hgb  10.7 L  10.6 L   (12.0-16.0)  gm/dl


 


Hct  36.5  36.1   (35-47)  %


 


MCV  100.6 H  101.4 H   ()  fl


 


MCH  29.4  29.7   (26-32)  pg


 


MCHC  29.3 L  29.4 L   (32-36)  g/dl


 


RDW  14.5 H  14.7 H   (11.5-14.0)  %


 


Plt Count  401  352   (150-450)  K/mm3


 


MPV  9.7 H  9.8 H   (6-9.5)  fl


 


Segmented Neutrophils  94 H  94 H   (36.0-66.0)  %


 


Band Neutrophils  3 H    (0.0-2.0)  %


 


Lymphocytes (Manual)  2 L  6 L   (24-44)  %


 


Monocytes (Manual)  1    (0.0-12.0)  %


 


Differential Comment  ABNORMAL  NORMAL   


 


Platelet Estimate  NORMAL  NORMAL   (NORMAL)  


 


Polychromasia  2+    


 


Basophilic Stippling  2+    


 


Sodium    138  (136-145)  mEq/L


 


Potassium    4.1  (3.5-5.1)  mEq/L


 


Chloride    98  ()  mEq/L


 


Carbon Dioxide    35.3 H  (21-32)  mEq/L


 


Anion Gap    8.3  (5-15)  MEQ/L


 


BUN    16  (9-20)  mg/dL


 


Creatinine    1.07  (0.55-1.30)  mg/dl


 


Estimated GFR    56  ML/MIN


 


Glucose    370 H  ()  MG/DL


 


Calcium    8.2 L  (8.5-10.1)  mg/dL











Radiology Exams: 


 Radiology Procedures











 Category Date Time Status


 


 CHEST 1 VIEW (PORTABLE) Routine Exams  01/23/17 08:12 Ordered


 


 ECHO W/2D AND DOPPLER [US] Routine Exams  01/23/17 08:09 Ordered














Assessment/Plan


(1) Pneumonia


Current Visit: Yes   Status: Acute   


Qualifiers: 


   Pneumonia type: due to other aerobic Gram-negative bacteria   Laterality: 

bilateral   Lung location: lower lobe of lung   Qualified Code(s): J15.6 - 

Pneumonia due to other aerobic Gram-negative bacteria   


Assessment & Plan: 


weant the steroid 


add accucheck and sliding scale with the elevated sugars


check echo with the cardiomegaly


wean O2 as tolerated although still on 5L


repeat cxr with the severe sob


restart her lasix and potassium 


Code(s): J18.9 - PNEUMONIA, UNSPECIFIED ORGANISM   





(2) Acute on chronic respiratory failure with hypoxemia


Current Visit: Yes   Status: Acute   Code(s): J96.21 - ACUTE AND CHRONIC 

RESPIRATORY FAILURE WITH HYPOXIA   





(3) Acute exacerbation of chronic obstructive airways disease


Current Visit: Yes   Status: Acute   Code(s): J44.1 - CHRONIC OBSTRUCTIVE 

PULMONARY DISEASE W (ACUTE) EXACERBATION   





(4) Hypertension


Current Visit: Yes   Status: Chronic   


Assessment & Plan: 


hold amlodipine witht he lower pressures


Code(s): I10 - ESSENTIAL (PRIMARY) HYPERTENSION   





(5) Anxiety


Current Visit: Yes   Status: Chronic   Code(s): F41.9 - ANXIETY DISORDER, 

UNSPECIFIED   





(6) Anemia


Current Visit: Yes   Status: Acute   Code(s): D64.9 - ANEMIA, UNSPECIFIED   





(7) Morbid obesity


Current Visit: Yes   Status: Chronic   Code(s): E66.01 - MORBID (SEVERE) 

OBESITY DUE TO EXCESS CALORIES

## 2017-01-23 NOTE — XRAY
Indication: Short of breath.



Comparison: January 20, 2017



Portable chest again limited by body habitus and appears grossly unchanged

with underinflated lungs, bilateral infiltrates/atelectasis/effusion, and

borderline cardiomegaly.  No new cardiopulmonary findings.

## 2017-01-24 LAB
ANION GAP SERPL CALC-SCNC: 8.5 MEQ/L (ref 5–15)
BUN SERPL-MCNC: 19 MG/DL (ref 9–20)
CHLORIDE SERPL-SCNC: 99 MEQ/L (ref 98–107)
CO2 SERPL-SCNC: 37.9 MEQ/L (ref 21–32)
GLUCOSE SERPL-MCNC: 210 MG/DL (ref 70–110)
MCH RBC QN AUTO: 29.4 PG (ref 26–32)
PLATELET # BLD AUTO: 342 K/MM3 (ref 150–450)
POTASSIUM SERPLBLD-SCNC: 3.7 MEQ/L (ref 3.5–5.1)
RBC # BLD AUTO: 3.63 M/MM3 (ref 4.1–5.4)
SODIUM SERPL-SCNC: 142 MEQ/L (ref 136–145)
WBC # BLD AUTO: 13 K/MM3 (ref 4–10.5)

## 2017-01-24 RX ADMIN — INSULIN ASPART PRN UNIT: 100 INJECTION, SOLUTION INTRAVENOUS; SUBCUTANEOUS at 08:18

## 2017-01-24 RX ADMIN — FERROUS SULFATE TAB 325 MG (65 MG ELEMENTAL FE) SCH MG: 325 (65 FE) TAB at 08:17

## 2017-01-24 RX ADMIN — PREDNISONE SCH MG: 20 TABLET ORAL at 08:17

## 2017-01-24 RX ADMIN — IPRATROPIUM BROMIDE AND ALBUTEROL SULFATE SCH ML: .5; 3 SOLUTION RESPIRATORY (INHALATION) at 06:48

## 2017-01-24 RX ADMIN — ALPRAZOLAM SCH MG: 1 TABLET ORAL at 07:09

## 2017-01-24 RX ADMIN — POTASSIUM CHLORIDE SCH MEQ: 10 TABLET, EXTENDED RELEASE ORAL at 08:18

## 2017-01-24 RX ADMIN — IPRATROPIUM BROMIDE AND ALBUTEROL SULFATE SCH ML: .5; 3 SOLUTION RESPIRATORY (INHALATION) at 00:38

## 2017-01-24 RX ADMIN — SIMETHICONE SCH MG: 80 TABLET, CHEWABLE ORAL at 08:16

## 2017-01-24 RX ADMIN — ESCITALOPRAM OXALATE SCH MG: 10 TABLET ORAL at 08:17

## 2017-01-24 RX ADMIN — GABAPENTIN SCH MG: 300 CAPSULE ORAL at 21:40

## 2017-01-24 RX ADMIN — SIMETHICONE SCH MG: 80 TABLET, CHEWABLE ORAL at 21:40

## 2017-01-24 RX ADMIN — INSULIN ASPART PRN UNIT: 100 INJECTION, SOLUTION INTRAVENOUS; SUBCUTANEOUS at 21:42

## 2017-01-24 RX ADMIN — IPRATROPIUM BROMIDE AND ALBUTEROL SULFATE SCH ML: .5; 3 SOLUTION RESPIRATORY (INHALATION) at 13:42

## 2017-01-24 RX ADMIN — ACETAMINOPHEN PRN MG: 325 TABLET ORAL at 12:17

## 2017-01-24 RX ADMIN — ACETAMINOPHEN PRN MG: 325 TABLET ORAL at 21:41

## 2017-01-24 RX ADMIN — ENOXAPARIN SODIUM SCH MG: 100 INJECTION SUBCUTANEOUS at 08:18

## 2017-01-24 RX ADMIN — HYDROCHLOROTHIAZIDE SCH MG: 25 TABLET ORAL at 08:17

## 2017-01-24 RX ADMIN — FUROSEMIDE SCH MG: 40 TABLET ORAL at 08:16

## 2017-01-24 RX ADMIN — CEFTRIAXONE SCH MLS/HR: 1 INJECTION, SOLUTION INTRAVENOUS at 21:40

## 2017-01-24 RX ADMIN — FERROUS SULFATE TAB 325 MG (65 MG ELEMENTAL FE) SCH MG: 325 (65 FE) TAB at 21:40

## 2017-01-24 RX ADMIN — SIMETHICONE SCH MG: 80 TABLET, CHEWABLE ORAL at 13:29

## 2017-01-24 RX ADMIN — ALPRAZOLAM SCH MG: 1 TABLET ORAL at 13:28

## 2017-01-24 RX ADMIN — FLUTICASONE PROPIONATE AND SALMETEROL XINAFOATE SCH PUFF: 230; 21 AEROSOL, METERED RESPIRATORY (INHALATION) at 19:43

## 2017-01-24 RX ADMIN — INSULIN ASPART PRN UNIT: 100 INJECTION, SOLUTION INTRAVENOUS; SUBCUTANEOUS at 17:21

## 2017-01-24 RX ADMIN — INSULIN ASPART PRN UNIT: 100 INJECTION, SOLUTION INTRAVENOUS; SUBCUTANEOUS at 12:12

## 2017-01-24 RX ADMIN — FLUTICASONE PROPIONATE AND SALMETEROL XINAFOATE SCH PUFF: 230; 21 AEROSOL, METERED RESPIRATORY (INHALATION) at 06:49

## 2017-01-24 RX ADMIN — GABAPENTIN SCH MG: 300 CAPSULE ORAL at 08:16

## 2017-01-24 RX ADMIN — ZOLPIDEM TARTRATE PRN MG: 5 TABLET ORAL at 21:42

## 2017-01-24 RX ADMIN — ALPRAZOLAM SCH MG: 1 TABLET ORAL at 21:54

## 2017-01-24 RX ADMIN — SIMETHICONE SCH MG: 80 TABLET, CHEWABLE ORAL at 17:21

## 2017-01-24 RX ADMIN — IPRATROPIUM BROMIDE AND ALBUTEROL SULFATE SCH ML: .5; 3 SOLUTION RESPIRATORY (INHALATION) at 19:43

## 2017-01-24 NOTE — ECHO
Transthoracic echocardiographic examination and color Doppler was done on 01/23/2017. 



INDICATION:  Shortness of breath, hypertension.      



The study is very limited because of limited acoustic window. The left ventricle was only 
partially visualized. The estimated global left ventricular ejection fraction is probably 
in the range of about 50%. The left atrium appears to be mildly dilated. The right 
ventricular systolic pressure is 26 mm of Mercury. The mitral, aortic, tricuspid and 
pulmonic valves are not well visualized.



IMPRESSION: 



THIS IS A FAIRLY LIMITED STUDY WHICH PRECLUDES ADEQUATE ASSESSMENT OF THE INTRACARDIAC 
ANATOMY AND PHYSIOLOGY.

## 2017-01-25 VITALS — SYSTOLIC BLOOD PRESSURE: 138 MMHG | HEART RATE: 75 BPM | DIASTOLIC BLOOD PRESSURE: 63 MMHG

## 2017-01-25 VITALS — OXYGEN SATURATION: 95 %

## 2017-01-25 RX ADMIN — INSULIN ASPART PRN UNIT: 100 INJECTION, SOLUTION INTRAVENOUS; SUBCUTANEOUS at 07:55

## 2017-01-25 RX ADMIN — GABAPENTIN SCH MG: 300 CAPSULE ORAL at 10:10

## 2017-01-25 RX ADMIN — HYDROCHLOROTHIAZIDE SCH MG: 25 TABLET ORAL at 10:10

## 2017-01-25 RX ADMIN — IPRATROPIUM BROMIDE AND ALBUTEROL SULFATE SCH ML: .5; 3 SOLUTION RESPIRATORY (INHALATION) at 01:14

## 2017-01-25 RX ADMIN — SIMETHICONE SCH MG: 80 TABLET, CHEWABLE ORAL at 13:04

## 2017-01-25 RX ADMIN — IPRATROPIUM BROMIDE AND ALBUTEROL SULFATE SCH: .5; 3 SOLUTION RESPIRATORY (INHALATION) at 13:04

## 2017-01-25 RX ADMIN — ALPRAZOLAM SCH MG: 1 TABLET ORAL at 13:04

## 2017-01-25 RX ADMIN — ESCITALOPRAM OXALATE SCH MG: 10 TABLET ORAL at 10:10

## 2017-01-25 RX ADMIN — IPRATROPIUM BROMIDE AND ALBUTEROL SULFATE SCH ML: .5; 3 SOLUTION RESPIRATORY (INHALATION) at 07:00

## 2017-01-25 RX ADMIN — PREDNISONE SCH MG: 20 TABLET ORAL at 10:10

## 2017-01-25 RX ADMIN — INSULIN ASPART PRN UNIT: 100 INJECTION, SOLUTION INTRAVENOUS; SUBCUTANEOUS at 11:44

## 2017-01-25 RX ADMIN — IPRATROPIUM BROMIDE AND ALBUTEROL SULFATE SCH ML: .5; 3 SOLUTION RESPIRATORY (INHALATION) at 13:09

## 2017-01-25 RX ADMIN — FERROUS SULFATE TAB 325 MG (65 MG ELEMENTAL FE) SCH MG: 325 (65 FE) TAB at 10:10

## 2017-01-25 RX ADMIN — FUROSEMIDE SCH MG: 40 TABLET ORAL at 10:10

## 2017-01-25 RX ADMIN — ENOXAPARIN SODIUM SCH MG: 100 INJECTION SUBCUTANEOUS at 10:10

## 2017-01-25 RX ADMIN — FLUTICASONE PROPIONATE AND SALMETEROL XINAFOATE SCH PUFF: 230; 21 AEROSOL, METERED RESPIRATORY (INHALATION) at 07:00

## 2017-01-25 RX ADMIN — SIMETHICONE SCH MG: 80 TABLET, CHEWABLE ORAL at 16:45

## 2017-01-25 RX ADMIN — ACETAMINOPHEN PRN MG: 325 TABLET ORAL at 17:11

## 2017-01-25 RX ADMIN — POTASSIUM CHLORIDE SCH MEQ: 10 TABLET, EXTENDED RELEASE ORAL at 10:10

## 2017-01-25 RX ADMIN — SIMETHICONE SCH MG: 80 TABLET, CHEWABLE ORAL at 10:10

## 2017-01-25 RX ADMIN — INSULIN ASPART PRN UNIT: 100 INJECTION, SOLUTION INTRAVENOUS; SUBCUTANEOUS at 16:45

## 2017-01-25 RX ADMIN — AZITHROMYCIN DIHYDRATE SCH MLS/HR: 500 INJECTION, POWDER, LYOPHILIZED, FOR SOLUTION INTRAVENOUS at 00:15

## 2017-01-25 RX ADMIN — ALPRAZOLAM SCH MG: 1 TABLET ORAL at 06:06

## 2017-01-25 NOTE — PCM.DCORD
- Discharge


Discharge Date: 01/25/17


Disposition: Home, Self-Care


Condition: Fair


Prescriptions: 


New


   Prednisone 10 mg*** [Deltasone 10 mg***] 10 mg PO DAILY #27 tablet


   Potassium Chloride 10 Meq Tab* [Klor Con 10 MEQ***] 20 meq PO DAILY #0 tab


   Furosemide 40 mg*** [Lasix 40 MG***] 40 mg PO DAILY #0 tablet


   Azithromycin 250 mg*** [Zithromax 250 MG TABLET***] 250 mg PO DAILY #2 tablet





Continue


   Gabapentin 300 mg PO BID


   Escitalopram Oxalate 10 mg** [Lexapro 10 MG**] 10 mg PO DAILY


   Simethicone 80 mg*** [Mylicon 80MG***] 80 mg PO QID


   Alprazolam 1 mg*** [Xanax 1 mg***] 1 mg PO TID #90 tablet


   Lisinopril [Zestril] 40 mg PO DAILY


   Ferrous Sulfate 325 mg PO BID


   Amlodipine Besylate 10 mg [Norvasc 10 MG] 10 mg PO DAILY


   Budesonide/Formoterol Fumarate [Symbicort 160-4.5 Mcg Inhaler] 6 gm IH BID


Instructions:  Chronic Obstructive Pulmonary Disease


Follow up with: 


ESTELITA NEAL [ACTIVE STAFF] - 1 Week


DOMINICK WONG [Primary Care Provider] - 1 Week


Forms:  Patient Portal Information

## 2017-02-13 ENCOUNTER — HOSPITAL ENCOUNTER (OUTPATIENT)
Dept: HOSPITAL 33 - MED SURG | Age: 57
Setting detail: OBSERVATION
LOS: 1 days | Discharge: HOME | End: 2017-02-14
Attending: FAMILY MEDICINE | Admitting: FAMILY MEDICINE
Payer: COMMERCIAL

## 2017-02-13 DIAGNOSIS — J44.1: Primary | ICD-10-CM

## 2017-02-13 DIAGNOSIS — Z23: ICD-10-CM

## 2017-02-13 DIAGNOSIS — I50.32: ICD-10-CM

## 2017-02-13 DIAGNOSIS — F41.8: ICD-10-CM

## 2017-02-13 DIAGNOSIS — D64.9: ICD-10-CM

## 2017-02-13 DIAGNOSIS — N28.9: ICD-10-CM

## 2017-02-13 DIAGNOSIS — M19.90: ICD-10-CM

## 2017-02-13 DIAGNOSIS — E66.01: ICD-10-CM

## 2017-02-13 DIAGNOSIS — I10: ICD-10-CM

## 2017-02-13 DIAGNOSIS — Z79.899: ICD-10-CM

## 2017-02-13 DIAGNOSIS — J96.21: ICD-10-CM

## 2017-02-13 LAB
ALBUMIN SERPL-MCNC: 3.1 G/DL (ref 3.4–5)
ALP SERPL-CCNC: 114 U/L (ref 46–116)
ALT SERPL-CCNC: 20 U/L (ref 12–78)
ANION GAP SERPL CALC-SCNC: 9.2 MEQ/L (ref 5–15)
AST SERPL QL: 10 U/L (ref 15–37)
BASOPHILS NFR BLD AUTO: 0.4 % (ref 0–0.4)
BILIRUB BLD-MCNC: 0.4 MG/DL (ref 0.2–1)
BUN SERPL-MCNC: 7 MG/DL (ref 9–20)
CHLORIDE SERPL-SCNC: 98 MEQ/L (ref 98–107)
CO2 SERPL-SCNC: 37.9 MEQ/L (ref 21–32)
GLUCOSE SERPL-MCNC: 157 MG/DL (ref 70–110)
MCH RBC QN AUTO: 29.8 PG (ref 26–32)
NEUTROPHILS NFR BLD AUTO: 84.9 % (ref 36–66)
PLATELET # BLD AUTO: 323 K/MM3 (ref 150–450)
POTASSIUM SERPLBLD-SCNC: 3.4 MEQ/L (ref 3.5–5.1)
PROT SERPL-MCNC: 7.3 GM/DL (ref 6.4–8.2)
RBC # BLD AUTO: 3.85 M/MM3 (ref 4.1–5.4)
SODIUM SERPL-SCNC: 142 MEQ/L (ref 136–145)
WBC # BLD AUTO: 13.8 K/MM3 (ref 4–10.5)

## 2017-02-13 PROCEDURE — 71020: CPT

## 2017-02-13 PROCEDURE — 93005 ELECTROCARDIOGRAM TRACING: CPT

## 2017-02-13 PROCEDURE — 82962 GLUCOSE BLOOD TEST: CPT

## 2017-02-13 PROCEDURE — 83880 ASSAY OF NATRIURETIC PEPTIDE: CPT

## 2017-02-13 PROCEDURE — 94640 AIRWAY INHALATION TREATMENT: CPT

## 2017-02-13 PROCEDURE — 36415 COLL VENOUS BLD VENIPUNCTURE: CPT

## 2017-02-13 PROCEDURE — 85025 COMPLETE CBC W/AUTO DIFF WBC: CPT

## 2017-02-13 PROCEDURE — 80053 COMPREHEN METABOLIC PANEL: CPT

## 2017-02-13 PROCEDURE — 90732 PPSV23 VACC 2 YRS+ SUBQ/IM: CPT

## 2017-02-13 PROCEDURE — 94760 N-INVAS EAR/PLS OXIMETRY 1: CPT

## 2017-02-13 RX ADMIN — GABAPENTIN SCH MG: 300 CAPSULE ORAL at 21:55

## 2017-02-13 RX ADMIN — WATER SCH MG: 1 INJECTION INTRAMUSCULAR; INTRAVENOUS; SUBCUTANEOUS at 21:55

## 2017-02-13 RX ADMIN — FLUTICASONE PROPIONATE AND SALMETEROL XINAFOATE SCH PUFF: 230; 21 AEROSOL, METERED RESPIRATORY (INHALATION) at 20:00

## 2017-02-13 RX ADMIN — ALPRAZOLAM SCH MG: 1 TABLET ORAL at 21:55

## 2017-02-13 RX ADMIN — WATER SCH MG: 1 INJECTION INTRAMUSCULAR; INTRAVENOUS; SUBCUTANEOUS at 14:44

## 2017-02-13 RX ADMIN — ACETAMINOPHEN PRN MG: 325 TABLET ORAL at 20:02

## 2017-02-13 RX ADMIN — INSULIN ASPART PRN UNIT: 100 INJECTION, SOLUTION INTRAVENOUS; SUBCUTANEOUS at 21:56

## 2017-02-13 RX ADMIN — FERROUS SULFATE TAB 325 MG (65 MG ELEMENTAL FE) SCH MG: 325 (65 FE) TAB at 21:55

## 2017-02-13 RX ADMIN — INSULIN ASPART PRN UNIT: 100 INJECTION, SOLUTION INTRAVENOUS; SUBCUTANEOUS at 17:01

## 2017-02-13 NOTE — XRAY
Indication: Short of breath.  Weakness.



Comparison: January 23, 2017.



PA/lateral chest again demonstrates cardiomegaly and bibasilar

infiltrates/atelectasis/effusion concerning for cardiac decompensation.

Superimposed pneumonia not completely excluded.  No new findings.

## 2017-02-14 VITALS — HEART RATE: 77 BPM | SYSTOLIC BLOOD PRESSURE: 123 MMHG | DIASTOLIC BLOOD PRESSURE: 58 MMHG

## 2017-02-14 VITALS — OXYGEN SATURATION: 94 %

## 2017-02-14 RX ADMIN — IPRATROPIUM BROMIDE AND ALBUTEROL SULFATE SCH: .5; 3 SOLUTION RESPIRATORY (INHALATION) at 14:33

## 2017-02-14 RX ADMIN — INSULIN ASPART PRN UNIT: 100 INJECTION, SOLUTION INTRAVENOUS; SUBCUTANEOUS at 11:18

## 2017-02-14 RX ADMIN — IPRATROPIUM BROMIDE AND ALBUTEROL SULFATE SCH ML: .5; 3 SOLUTION RESPIRATORY (INHALATION) at 10:45

## 2017-02-14 RX ADMIN — WATER SCH MG: 1 INJECTION INTRAMUSCULAR; INTRAVENOUS; SUBCUTANEOUS at 06:23

## 2017-02-14 RX ADMIN — GABAPENTIN SCH MG: 300 CAPSULE ORAL at 09:00

## 2017-02-14 RX ADMIN — IPRATROPIUM BROMIDE AND ALBUTEROL SULFATE SCH ML: .5; 3 SOLUTION RESPIRATORY (INHALATION) at 07:05

## 2017-02-14 RX ADMIN — ACETAMINOPHEN PRN MG: 325 TABLET ORAL at 04:40

## 2017-02-14 RX ADMIN — ALPRAZOLAM SCH MG: 1 TABLET ORAL at 08:59

## 2017-02-14 RX ADMIN — ALPRAZOLAM SCH MG: 1 TABLET ORAL at 13:48

## 2017-02-14 RX ADMIN — FLUTICASONE PROPIONATE AND SALMETEROL XINAFOATE SCH PUFF: 230; 21 AEROSOL, METERED RESPIRATORY (INHALATION) at 07:05

## 2017-02-14 RX ADMIN — FERROUS SULFATE TAB 325 MG (65 MG ELEMENTAL FE) SCH MG: 325 (65 FE) TAB at 08:58

## 2017-02-14 RX ADMIN — ACETAMINOPHEN PRN MG: 325 TABLET ORAL at 10:11

## 2017-02-14 RX ADMIN — INSULIN ASPART PRN UNIT: 100 INJECTION, SOLUTION INTRAVENOUS; SUBCUTANEOUS at 07:54

## 2017-02-14 NOTE — PCM.DCORD
- Discharge


Discharge Date: 02/14/17


Disposition: Home, Self-Care


Condition: Stable


Prescriptions: 


New


   Doxycycline Hyclate 100 mg*** [Vibramycin 100 MG***] 100 mg PO BID #10 tab





Continue


   Gabapentin 300 mg PO BID


   Escitalopram Oxalate 10 mg** [Lexapro 10 MG**] 10 mg PO DAILY


   Simethicone 80 mg*** [Mylicon 80MG***] 80 mg PO QID PRN


     PRN Reason: Stomach Upset


   Alprazolam 1 mg*** [Xanax 1 mg***] 1 mg PO TID #90 tablet


   Lisinopril [Zestril] 40 mg PO DAILY


   Ferrous Sulfate 325 mg PO BID


   Amlodipine Besylate 10 mg [Norvasc 10 MG] 10 mg PO DAILY


   Budesonide/Formoterol Fumarate [Symbicort 160-4.5 Mcg Inhaler] 6 gm IH BID


   Potassium Chloride 10 Meq Tab* [Klor Con 10 MEQ***] 20 meq PO DAILY #0 tab


   Furosemide 40 mg*** [Lasix 40 MG***] 40 mg PO DAILY #0 tablet


   Prednisone 20 mg*** [Deltasone 20 mg***] 20 mg PO DAILY #5 tablet


Follow up with: 


DOMINICK WONG [Primary Care Provider] - 1 Week


Forms:  Patient Portal Information

## 2017-02-14 NOTE — PCM.HP
History of Present Illness





- Chief Complaint


Chief Complaint: ex COPD


Date: 02/14/17


History of Present Illness: 


 is a 56 year old female.


who presented for routine follow up in the clinic yesterday but had started to 

have increased shortness of breath for the previous 4 days she was using her 

home oxygen but had to increase it due to dyspnea in the waiting room form 3L 

to 4L despite that her sats were still in the 80 to 88 range at rest on this 

and she was dyspneic on this. She states he was not having any swelling so had 

stopped her lasix as she was using it as needed. She has not had any chest pain 

or palpitations and the shortness of breath is constant and worsening she was 

on the last day of her steroid taper. NO fevers.


Since arrival her symptoms have improved markedly she is feeling much less 

short of breath she had no chest pain. She was given lasix and this seems to 

have improved her as well as the iv steroids and antibiotic and the breathing 

treatments. her Sats are doing well on her home O2 and she is up and active 

without more then her baseline shortness of breath now. 





- Review of Systems


Constitutional: No Symptoms


Eyes: No Symptoms


Ears, Nose, & Throat: No Symptoms


Respiratory: Cough, Short Of Breath


Cardiac: No Chest Pain, No Edema, No Syncope


Abdominal/Gastrointestinal: No Abdominal Pain, No Nausea, No Vomiting, No 

Diarrhea


Genitourinary Symptoms: No Dysuria


Musculoskeletal: No Back Pain, No Neck Pain


Skin: No Rash


Neurological: No Dizziness, No Focal Weakness, No Sensory Changes


Psychological: No Symptoms


Endocrine: No Symptoms


Hematologic/Lymphatic: No Symptoms


Immunological/Allergic: No Symptoms





Medications & Allergies


Home Medications: 


 Home Medication List





Escitalopram Oxalate 10 mg** [Lexapro 10 MG**] 10 mg PO DAILY 12/17/15 [History 

Confirmed 02/13/17]


Gabapentin 300 mg PO BID 12/17/15 [History Confirmed 02/13/17]


Simethicone 80 mg*** [Mylicon 80MG***] 80 mg PO QID PRN 12/17/15 [History 

Confirmed 02/13/17]


Alprazolam 1 mg*** [Xanax 1 mg***] 1 mg PO TID #90 tablet 12/18/15 [Rx 

Confirmed 02/13/17]


Amlodipine Besylate 10 mg [Norvasc 10 MG] 10 mg PO DAILY 07/13/16 [History 

Confirmed 02/13/17]


Ferrous Sulfate 325 mg PO BID 07/13/16 [History Confirmed 02/13/17]


Lisinopril [Zestril] 40 mg PO DAILY 07/13/16 [History Confirmed 02/13/17]


Budesonide/Formoterol Fumarate [Symbicort 160-4.5 Mcg Inhaler] 6 gm IH BID 01/21 /17 [History Confirmed 02/13/17]


Furosemide 40 mg*** [Lasix 40 MG***] 40 mg PO DAILY #0 tablet 01/25/17 [Rx 

Confirmed 02/13/17]


Potassium Chloride 10 Meq Tab* [Klor Con 10 MEQ***] 20 meq PO DAILY #0 tab 01/25 /17 [Rx Confirmed 02/13/17]


Doxycycline Hyclate 100 mg*** [Vibramycin 100 MG***] 100 mg PO BID #10 tab 02/14 /17 [Rx]


Prednisone 20 mg*** [Deltasone 20 mg***] 20 mg PO DAILY #5 tablet 02/14/17 [Rx]








Allergies/Adverse Reactions: 


 Allergies











Allergy/AdvReac Type Severity Reaction Status Date / Time


 


No Known Drug Allergies Allergy   Verified 10/06/16 09:43














- Past Medical History


Past Medical History: Yes


Neurological History: Seizures


ENT History: No Pertinent History


Cardiac History: Hypertension


Respiratory History: COPD, Pneumonia


Endocrine Medical History: No Pertinent History


Musculoskelatal History: Osteoarthritis


GI Medical History: Gallbladder Disease, Hernia, Other


 History: Renal Disease


Pyscho-Social History: Anxiety, Depression


Reproductive Disorders: No Pertinent History


Comment: PERF BOWEL 4/2014. pt states she had a seizure because she ran out of 

xanax, pt states she does not remember it, [facility]  

Pt states that her kidneys leak protein.





- Female History


Are you pregnant now?: No





- Past Surgical History


Past Surgical History: Yes


Neuro Surgical History: No Pertinent History


Cardiac History: No Pertinent History


Respiratory Surgery: No Pertinent History


GI Surgical History: Cholecystectomy, Colon Resection


Genitourinary Surgical Hx: No Pertinent History


Musculskeletal Surgical Hx: No Pertinent History


Female Surgical History: No Pertinent History





- Social History


Smoking Status: Former smoker


How long have you smoked: 30


Exposure to second hand smoke: No


Alcohol: None


Drug Use: none





- Physical Exam


Vital Signs: 


 Vital Signs - 24 hr











  Temp Pulse Resp BP Pulse Ox


 


 02/14/17 07:26  97.9 F  81  22  130/60  95


 


 02/14/17 07:19   81  20   94 L


 


 02/14/17 04:00  97.3 F  84  26 H  140/65  93 L


 


 02/14/17 00:00    20  


 


 02/13/17 23:25  98.2 F  79  20  136/62  95


 


 02/13/17 20:00    24  


 


 02/13/17 19:48  98.2 F  95 H  24  144/72  94 L


 


 02/13/17 18:14   91 H  24   83 L


 


 02/13/17 15:44  98.3 F  101 H  22  178/82  93 L


 


 02/13/17 14:58   101 H  22   93 L


 


 02/13/17 14:34  98.3 F  96 H  18  178/82  91 L


 


 02/13/17 14:07  98.3 F  96 H   178/82 


 


 02/13/17 13:52  98.3 F  96 H  18  178/82  91 L








 Oxygen-Last 24 hours











O2 Percentage                  4 Liters = 36%


 


O2 Percentage                  4 Liters = 36%


 


O2 Percentage                  4 Liters = 36%


 


O2 Percentage                  4 Liters = 36%


 


O2 Percentage                  4 Liters = 36%














General Appearance: no apparent distress, alert, obese


Neurologic Exam: alert, oriented x 3, cooperative, normal mood/affect, nml 

cerebellar function, nml station & gait, sensation nml, No motor deficits


Eye Exam: PERRL/EOMI, eyes nml inspection


Ears, Nose, Throat Exam: normal ENT inspection, TMs normal, pharynx normal, 

moist mucous membranes


Neck Exam: normal inspection, non-tender, supple, full range of motion


Respiratory Exam: wheezing, No respiratory distress


Cardiovascular Exam: regular rate/rhythm, normal heart sounds, normal 

peripheral pulses


Gastrointestinal/Abdomen Exam: soft, normal bowel sounds, No tenderness, No mass


Back Exam: normal inspection, normal range of motion, No CVA tenderness, No 

vertebral tenderness


Extremity Exam: normal inspection, normal range of motion, pelvis stable


Skin Exam: normal color, warm, dry, No rash


Lymphatic Exam: No adenopathy





Results





- Labs


Lab/Micro Results: 


 Accuchecks











Date                           02/14/17


 


Date                           02/13/17


 


Date                           02/13/17


 


Time                           07:30


 


Time                           22:00


 


Time                           17:00


 


Accucheck Value:               382


 


Accucheck Value:               345


 


Accucheck Value:               278











 Lab Results-Last 24 Hours











  02/13/17 02/13/17 Range/Units





  14:21 14:21 


 


WBC  13.8 H   (4.0-10.5)  K/mm3


 


RBC  3.85 L   (4.1-5.4)  M/mm3


 


Hgb  11.5 L   (12.0-16.0)  gm/dl


 


Hct  39.0   (35-47)  %


 


MCV  101.3 H   ()  fl


 


MCH  29.8   (26-32)  pg


 


MCHC  29.5 L   (32-36)  g/dl


 


RDW  14.2 H   (11.5-14.0)  %


 


Plt Count  323   (150-450)  K/mm3


 


MPV  9.0   (6-9.5)  fl


 


Gran %  84.9 H   (36.0-66.0)  %


 


Lymphocytes %  8.5 L   (24.0-44.0)  %


 


Monocytes %  4.2   (0.0-12.0)  %


 


Eosinophils %  2.0   (0.00-5.0)  %


 


Basophils %  0.4   (0.0-0.4)  %


 


Basophils #  0.06   (0-0.4)  


 


Sodium   142  (136-145)  mEq/L


 


Potassium   3.4 L  (3.5-5.1)  mEq/L


 


Chloride   98  ()  mEq/L


 


Carbon Dioxide   37.9 H  (21-32)  mEq/L


 


Anion Gap   9.2  (5-15)  MEQ/L


 


BUN   7 L  (9-20)  mg/dL


 


Creatinine   0.88  (0.55-1.30)  mg/dl


 


Estimated GFR   > 60  ML/MIN


 


Glucose   157 H  ()  MG/DL


 


Calcium   8.7  (8.5-10.1)  mg/dL


 


Total Bilirubin   0.4  (0.2-1.0)  mg/dL


 


AST   10 L  (15-37)  U/L


 


ALT   20  (12-78)  U/L


 


Alkaline Phosphatase   114  ()  U/L


 


NT-Pro-B Natriuret Pep   630 H  (0-125)  pg/ml


 


Serum Total Protein   7.3  (6.4-8.2)  gm/dL


 


Albumin   3.1 L  (3.4-5.0)  g/dL








 Accuchecks











Date                           02/14/17


 


Date                           02/13/17


 


Date                           02/13/17


 


Time                           07:30


 


Time                           22:00


 


Time                           17:00


 


Accucheck Value:               382


 


Accucheck Value:               345


 


Accucheck Value:               278

















- Radiology Impressions


Radiology Exams & Impressions: 


 Radiology Procedures











 Category Date Time Status


 


 CHEST 2 VIEWS (PA AND LAT) Stat Exams  02/13/17 14:05 Completed














- Other Procedures and Tests


 Respiratory Therapy





02/13/17 18:00


Respiratory Nebulizer PRN 





02/13/17 19:00


Respiratory MDI BID 


Respiratory Nebulizer QID 





02/13/17 21:37


Oxygen NASAL CANNULA 4 lpm 














Assessment/Plan


(1) Acute exacerbation of chronic obstructive airways disease


Current Visit: Yes   Status: Acute   


Assessment & Plan: 


she had rapid improvement it was likely combination of persistent symptoms from 

previous copd exacerbation as well as some mild increased fluid that was 

improved with the iv steroids and the doxycycline


will continue the steroid taper out an additional 5 days and finish the 

doxycycline and f/u with Dr. aPlacios as well. 


Code(s): J44.1 - CHRONIC OBSTRUCTIVE PULMONARY DISEASE W (ACUTE) EXACERBATION   





(2) Acute on chronic respiratory failure with hypoxemia


Current Visit: Yes   Status: Acute   Code(s): J96.21 - ACUTE AND CHRONIC 

RESPIRATORY FAILURE WITH HYPOXIA   





(3) Chronic congestive heart failure with left ventricular diastolic dysfunction


Current Visit: Yes   Status: Chronic   


Assessment & Plan: 


she had echo 3 weeks ago that was limited quality due to her obesity but ef was 

estimated at 50%


restart the lasix daily with the potassium 


Code(s): I50.32 - CHRONIC DIASTOLIC (CONGESTIVE) HEART FAILURE   





(4) Hypertension


Current Visit: No   Status: Chronic   Code(s): I10 - ESSENTIAL (PRIMARY) 

HYPERTENSION   





(5) Anxiety


Current Visit: No   Status: Chronic   Code(s): F41.9 - ANXIETY DISORDER, 

UNSPECIFIED   





(6) Anemia


Current Visit: No   Status: Acute   Code(s): D64.9 - ANEMIA, UNSPECIFIED   





(7) Morbid obesity


Current Visit: No   Status: Chronic   Code(s): E66.01 - MORBID (SEVERE) OBESITY 

DUE TO EXCESS CALORIES

## 2017-02-28 ENCOUNTER — HOSPITAL ENCOUNTER (EMERGENCY)
Dept: HOSPITAL 33 - ED | Age: 57
Discharge: HOME | End: 2017-02-28
Payer: COMMERCIAL

## 2017-02-28 VITALS — OXYGEN SATURATION: 96 % | DIASTOLIC BLOOD PRESSURE: 70 MMHG | SYSTOLIC BLOOD PRESSURE: 147 MMHG | HEART RATE: 87 BPM

## 2017-02-28 DIAGNOSIS — T16.1XXA: Primary | ICD-10-CM

## 2017-02-28 PROCEDURE — 69210 REMOVE IMPACTED EAR WAX UNI: CPT

## 2017-02-28 PROCEDURE — 99283 EMERGENCY DEPT VISIT LOW MDM: CPT

## 2017-02-28 NOTE — ERPHSYRPT
- History of Present Illness


Time Seen by Provider: 02/28/17 20:37


Source: patient


Exam Limitations: no limitations


Physician History: 





TWO DAYS AGO PT INSERTED TOILET PAPER IN BOTH EARS AND IS HERE FOR REMOVAL; 

DENIES FEVER, CHILLS, VOMITING, CHEST PAIN, SORE THROAT.


Allergies/Adverse Reactions: 








No Known Drug Allergies Allergy (Verified 02/28/17 20:44)


 





Home Medications: 








Escitalopram Oxalate 10 mg** [Lexapro 10 MG**] 10 mg PO DAILY 12/17/15 [History]


Gabapentin 300 mg PO BID 12/17/15 [History]


Simethicone 80 mg*** [Mylicon 80MG***] 80 mg PO QID PRN 12/17/15 [History]


Amlodipine Besylate 10 mg [Norvasc 10 MG] 10 mg PO DAILY 07/13/16 [History]


Ferrous Sulfate 325 mg PO BID 07/13/16 [History]


Lisinopril [Zestril] 40 mg PO DAILY 07/13/16 [History]


Budesonide/Formoterol Fumarate [Symbicort 160-4.5 Mcg Inhaler] 6 gm IH BID 01/21 /17 [History]





Hx Tetanus, Diphtheria Vaccination/Date Given: Yes


Hx Influenza Vaccination/Date Given: Yes


Hx Pneumococcal Vaccination/Date Given: No





- Review of Systems


Constitutional: No Fever


Ears, Nose, & Throat: Other (TP IN BOTH EARS), No Throat Pain


Respiratory: Dyspnea (CHRONIC)


Cardiac: No Chest Pain


Abdominal/Gastrointestinal: No Abdominal Pain, No Vomiting


Endocrine: No Excessive Sweating


All Other Systems: Reviewed and Negative





- Past Medical History


Pertinent Past Medical History: Yes


Neurological History: Seizures


ENT History: No Pertinent History


Cardiac History: Hypertension


Respiratory History: COPD, Pneumonia


Endocrine Medical History: No Pertinent History


Musculoskeletal History: Osteoarthritis


GI Medical History: Gallbladder Disease, Hernia, Other


 History: Renal Disease


Psycho-Social History: Anxiety, Depression


Female Reproductive Disorders: No Pertinent History


Other Medical History: PERF BOWEL 4/2014. pt states she had a seizure because 

she ran out of xanax, pt states she does not remember it, [facility]  Pt states that her kidneys leak protein.





- Past Surgical History


Past Surgical History: Yes


Neuro Surgical History: No Pertinent History


Cardiac: No Pertinent History


Respiratory: No Pertinent History


Gastrointestinal: Cholecystectomy, Colon Resection


Genitourinary: No Pertinent History


Musculoskeletal: No Pertinent History


Female Surgical History: No Pertinent History





- Social History


Smoking Status: Former smoker


How long have you smoked: 30


Exposure to second hand smoke: No


Drug Use: none


Patient Lives Alone: No





- Female History


Hx Pregnant Now: No





- Nursing Vital Signs


Nursing Vital Signs: 


 Initial Vital Signs











Temperature                    98.0 F


 


Temperature Source             Oral


 


Pulse Rate                     94


 


Respiratory Rate               22


 


Blood Pressure [Right Arm]     140/73


 


Pain Intensity                 4

















- Physical Exam


General Appearance: alert


Eye Exam: bilateral eye: PERRL, EOMI


Ear Exam: right ear: other (SMALL AMOUNT OF TOILET PAPER IN RIGHT EXTERNAL 

AUDITORY CANAL AND SOME CERUMEN ALSO PRESENT.), bilateral ear: TM normal


Nasal Exam: normal inspection


Throat Exam: pharynx normal, moist mucus membranes


Neck Exam: normal inspection


Cardiovascular/Respiratory Exam: normal breath sounds, heart sounds normal


Neurologic Exam: alert, cooperative


Skin Exam: warm, dry


**SpO2 Interpretation**: borderline oxygenation


SpO2: 91


Oxygen Delivery: Nasal Cannula





- Course


Nursing assessment & vital signs reviewed: Yes


Ordered Tests: 


 Active Orders 24 hr











 Category Date Time Status


 


 Ear Irrigation STAT Care  02/28/17 20:43 Active














- Progress


Progress Note: 





02/28/17 21:39


F.B.(TOILET PAPER) REMOVED FROM RIGHT EAR BY IRRIGATION BY STAFF(LUIS MANUEL). I 

EXAMINED RIGHT TM POST REMOVAL AND IT IS NORMAL.





- Departure


Time of Disposition: 21:40


Departure Disposition: Home


Clinical Impression: 


 F.B.(TOILET PAPER) REMOVAL FROM RIGHT EAR





Condition: Fair


Critical Care Time: No


Referrals: 


DOMINIKC WONG [Primary Care Provider] - 


Instructions:  Removal of Foreign Body From Ear


Additional Instructions: 


FOLLOW UP WITH PRIVATE DOCTOR TOMORROW.

## 2018-07-21 ENCOUNTER — HOSPITAL ENCOUNTER (INPATIENT)
Dept: HOSPITAL 33 - ED | Age: 58
LOS: 1 days | Discharge: ANOTHER HOSPITAL | DRG: 189 | End: 2018-07-22
Attending: FAMILY MEDICINE | Admitting: FAMILY MEDICINE
Payer: COMMERCIAL

## 2018-07-21 DIAGNOSIS — K82.9: ICD-10-CM

## 2018-07-21 DIAGNOSIS — F41.8: ICD-10-CM

## 2018-07-21 DIAGNOSIS — Z79.899: ICD-10-CM

## 2018-07-21 DIAGNOSIS — I10: ICD-10-CM

## 2018-07-21 DIAGNOSIS — J15.6: ICD-10-CM

## 2018-07-21 DIAGNOSIS — N28.9: ICD-10-CM

## 2018-07-21 DIAGNOSIS — J96.20: Primary | ICD-10-CM

## 2018-07-21 DIAGNOSIS — G40.909: ICD-10-CM

## 2018-07-21 DIAGNOSIS — J44.9: ICD-10-CM

## 2018-07-21 DIAGNOSIS — M19.90: ICD-10-CM

## 2018-07-21 DIAGNOSIS — Z87.891: ICD-10-CM

## 2018-07-21 LAB
ALBUMIN SERPL-MCNC: 4 G/DL (ref 3.5–5)
ALP SERPL-CCNC: 152 U/L (ref 38–126)
ALT SERPL-CCNC: 15 U/L (ref 0–35)
ANION GAP SERPL CALC-SCNC: 13.7 MEQ/L (ref 5–15)
ARTERIAL PATENCY WRIST A: YES
AST SERPL QL: 15 U/L (ref 14–36)
BASE EXCESS BLDA CALC-SCNC: 8.7 MMOL/L (ref -2–2)
BILIRUB BLD-MCNC: 0.3 MG/DL (ref 0.2–1.3)
BNP SERPL-MCNC: 1020 PG/ML (ref 0–900)
BUN SERPL-MCNC: 10 MG/DL (ref 7–17)
CALCIUM SPEC-MCNC: 9 MG/DL (ref 8.4–10.2)
CHLORIDE SERPL-SCNC: 86 MMOL/L (ref 98–107)
CO2 SERPL-SCNC: 33 MMOL/L (ref 22–30)
COHGB BLD-MCNC: 2.2 % THGB (ref 0–6.9)
CREAT SERPL-MCNC: 0.91 MG/DL (ref 0.52–1.04)
GAS PNL BLDA: 37 C
GAS PNL BLDA: 9.3
GLUCOSE SERPL-MCNC: 138 MG/DL (ref 74–106)
GRANULOCYTES # BLD AUTO: 17.79 10*3/UL (ref 1.4–6.9)
HCO3 BLDA-SCNC: 36.1 MMOL/L (ref 22–28)
HCT VFR BLD AUTO: 30.6 % (ref 35–47)
HGB BLD-MCNC: 9.2 GM/DL (ref 12–16)
INHALED O2 CONCENTRATION: 60 %
MCH RBC QN AUTO: 29.6 PG (ref 26–32)
MCHC RBC AUTO-ENTMCNC: 30.1 G/DL (ref 32–36)
METHGB MFR BLDA: 1 % (ref 1.4–1.5)
O2 A-A PPRESDIFF RESPIRATORY: 271 MM[HG]
PCO2 BLDA: 67 MMHG (ref 35–45)
PLATELET # BLD AUTO: 502 K/MM3 (ref 150–450)
PO2 BLDA: 73 MMHG (ref 75–100)
POTASSIUM BLDA-SCNC: 5.2 MMOL/L (ref 3.5–5.1)
POTASSIUM SERPLBLD-SCNC: 5.6 MMOL/L (ref 3.5–5.1)
PROT SERPL-MCNC: 7.5 G/DL (ref 6.3–8.2)
RBC # BLD AUTO: 3.1 M/MM3 (ref 4.1–5.4)
SAO2 % BLDA FROM PO2: 0.21 %
SAO2 % BLDA: 93.8 G/DF (ref 94–100)
SAO2 % BLDA: 96.9 % (ref 95–100)
SODIUM SERPL-SCNC: 128 MMOL/L (ref 137–145)
SPECIMEN SOURCE: (no result)
WBC # BLD AUTO: 19.2 K/MM3 (ref 4–10.5)

## 2018-07-21 PROCEDURE — 93041 RHYTHM ECG TRACING: CPT

## 2018-07-21 PROCEDURE — 87086 URINE CULTURE/COLONY COUNT: CPT

## 2018-07-21 PROCEDURE — 96374 THER/PROPH/DIAG INJ IV PUSH: CPT

## 2018-07-21 PROCEDURE — 99285 EMERGENCY DEPT VISIT HI MDM: CPT

## 2018-07-21 PROCEDURE — 36600 WITHDRAWAL OF ARTERIAL BLOOD: CPT

## 2018-07-21 PROCEDURE — 80048 BASIC METABOLIC PNL TOTAL CA: CPT

## 2018-07-21 PROCEDURE — 36415 COLL VENOUS BLD VENIPUNCTURE: CPT

## 2018-07-21 PROCEDURE — 82803 BLOOD GASES ANY COMBINATION: CPT

## 2018-07-21 PROCEDURE — 80053 COMPREHEN METABOLIC PANEL: CPT

## 2018-07-21 PROCEDURE — 71046 X-RAY EXAM CHEST 2 VIEWS: CPT

## 2018-07-21 PROCEDURE — 83605 ASSAY OF LACTIC ACID: CPT

## 2018-07-21 PROCEDURE — 87040 BLOOD CULTURE FOR BACTERIA: CPT

## 2018-07-21 PROCEDURE — 83880 ASSAY OF NATRIURETIC PEPTIDE: CPT

## 2018-07-21 PROCEDURE — 85379 FIBRIN DEGRADATION QUANT: CPT

## 2018-07-21 PROCEDURE — 85025 COMPLETE CBC W/AUTO DIFF WBC: CPT

## 2018-07-21 PROCEDURE — 36000 PLACE NEEDLE IN VEIN: CPT

## 2018-07-21 PROCEDURE — 94640 AIRWAY INHALATION TREATMENT: CPT

## 2018-07-21 PROCEDURE — 71260 CT THORAX DX C+: CPT

## 2018-07-21 PROCEDURE — 82375 ASSAY CARBOXYHB QUANT: CPT

## 2018-07-21 PROCEDURE — 84484 ASSAY OF TROPONIN QUANT: CPT

## 2018-07-21 PROCEDURE — 93005 ELECTROCARDIOGRAM TRACING: CPT

## 2018-07-21 PROCEDURE — 96365 THER/PROPH/DIAG IV INF INIT: CPT

## 2018-07-21 PROCEDURE — 94002 VENT MGMT INPAT INIT DAY: CPT

## 2018-07-21 RX ADMIN — FLUTICASONE PROPIONATE AND SALMETEROL XINAFOATE SCH PUFF: 230; 21 AEROSOL, METERED RESPIRATORY (INHALATION) at 20:45

## 2018-07-21 RX ADMIN — ALPRAZOLAM SCH MG: 1 TABLET ORAL at 23:02

## 2018-07-21 RX ADMIN — ALPRAZOLAM SCH MG: 1 TABLET ORAL at 22:55

## 2018-07-21 NOTE — ERPHSYRPT
- History of Present Illness


Time Seen by Provider: 07/21/18 17:03


Source: patient, family


Exam Limitations: no limitations


Patient Subjective Stated Complaint: Pt states "I have been short of breath for 

a week and today I cannot keep my oxygen up."


Triage Nursing Assessment: Pt alert and oriented X 3, skin pwd. PT obese, with 

noted abdominal hernia.  PT tachypneic.  PT has raspy voice, speaks in two to 

three word sentences.


Physician History: 





The patient is a 58-year-old obese female with her daughter complaining of 

increasing shortness of breath for one week.  She has COPD and uses 4 L 

supplemental home oxygen.  When she arrives to the ER her O2 saturation is 67%.

  Her past medical history significant for COPD, hypertension, and congestive 

heart failure.


Timing/Duration: week(s) (1), gradual onset, worse


Activities at Onset: activity


Severity of Dyspnea-Max: severe


Severity of Dyspnea-Current: severe


Possible Cause: frequent episodes


Modifying Factors: Improves With: activity, albuterol inhaler


Associated Symptoms: cough, wheezing


Allergies/Adverse Reactions: 








No Known Drug Allergies Allergy (Verified 02/28/17 20:44)


 





Home Medications: 








Escitalopram Oxalate 10 mg** [Lexapro 10 MG**] 10 mg PO DAILY 12/17/15 [History]


Gabapentin 300 mg PO BID 12/17/15 [History]


Simethicone 80 mg*** [Mylicon 80MG***] 80 mg PO QID PRN 12/17/15 [History]


Amlodipine Besylate 10 mg [Norvasc 10 MG] 10 mg PO DAILY 07/13/16 [History]


Ferrous Sulfate 325 mg PO BID 07/13/16 [History]


Lisinopril [Zestril] 40 mg PO DAILY 07/13/16 [History]


Budesonide/Formoterol Fumarate [Symbicort 160-4.5 Mcg Inhaler] 6 gm IH BID 01/21 /17 [History]





Hx Tetanus, Diphtheria Vaccination/Date Given: Yes


Hx Influenza Vaccination/Date Given: Yes


Hx Pneumococcal Vaccination/Date Given: No


Immunizations Up to Date: Yes





- Review of Systems


Constitutional: No Fever, No Chills


Eyes: No Symptoms


Ears, Nose, & Throat: No Symptoms


Respiratory: Cough, Dyspnea, Dyspnea on Exertion (EARLY)


Cardiac: No Chest Pain, No Edema, No Syncope


Abdominal/Gastrointestinal: No Abdominal Pain, No Nausea, No Vomiting, No 

Diarrhea


Genitourinary Symptoms: No Dysuria


Musculoskeletal: No Back Pain, No Neck Pain


Skin: No Rash


Neurological: No Dizziness, No Focal Weakness, No Sensory Changes


Psychological: No Symptoms


Endocrine: No Symptoms


Hematologic/Lymphatic: No Symptoms


Immunological/Allergic: No Symptoms


All Other Systems: Reviewed and Negative





- Past Medical History


Pertinent Past Medical History: Yes


Neurological History: Seizures


ENT History: No Pertinent History


Cardiac History: Hypertension


Respiratory History: COPD, Pneumonia


Endocrine Medical History: No Pertinent History


Musculoskeletal History: Osteoarthritis


GI Medical History: Gallbladder Disease, Hernia, Other


 History: Renal Disease


Psycho-Social History: Anxiety, Depression


Female Reproductive Disorders: No Pertinent History


Other Medical History: PERF BOWEL 4/2014. pt states she had a seizure because 

she ran out of xanax, pt states she does not remember it, she was [facility] 

[facility].  Pt states that her kidneys leak protein.





- Past Surgical History


Past Surgical History: Yes


Neuro Surgical History: No Pertinent History


Cardiac: No Pertinent History


Respiratory: No Pertinent History


Gastrointestinal: Cholecystectomy, Colon Resection


Genitourinary: No Pertinent History


Musculoskeletal: No Pertinent History


Female Surgical History: No Pertinent History





- Social History


Smoking Status: Former smoker


How long have you smoked: 30


Exposure to second hand smoke: Yes


Drug Use: none


Patient Lives Alone: Yes





- Female History


Hx Pregnant Now: No





- Nursing Vital Signs


Nursing Vital Signs: 


 Initial Vital Signs











Temperature  98.8 F   07/21/18 16:56


 


Pulse Rate  86   07/21/18 16:56


 


Respiratory Rate  26 H  07/21/18 16:56


 


Blood Pressure  137/66   07/21/18 16:56


 


O2 Sat by Pulse Oximetry  67 L  07/21/18 16:56








 Pain Scale











Pain Intensity                 8

















- Physical Exam


General Appearance: moderate distress, obese


Eye Exam: PERRL/EOMI


Ears, Nose, Throat Exam: hearing grossly normal


Neck Exam: normal inspection, supple


Respiratory Exam: wheezing


Cardiovascular/Chest Exam: normal heart sounds, regular rate/rhythm


Abdominal/Gastrointestinal Exam: soft, No tenderness, No distention, No mass


Rectal Exam: not done


Extremity Exam: non-tender, normal range of motion, normal inspection, no calf 

tenderness, no pedal edema


Neurologic Exam: alert, oriented x 3, cooperative, CNs II-XII nml as tested, 

sensation nml, No motor deficits


Skin Exam: normal color, warm, No dry


**SpO2 Interpretation**: hypoxic, O2 applied


SpO2: 97


Oxygen Delivery: Non-rebreather





- Course


EKG Interpreted by Me: RATE, Sinus Rhythm, NORMAL AXIS, NORMAL INTERVALS, 

NORMAL QRS, NORMAL ST-T





- Radiology Exams


  ** Chest


X-ray Interpretation: Interpreted by me, Infiltrates (diffuse bilateral 

infiltrates. comp 2v cxr 2/13/17.)


Ordered Tests: 


 Active Orders 24 hr











 Category Date Time Status


 


 Cardiac Monitor STAT Care  07/21/18 17:21 Active


 


 EKG-ER Only STAT Care  07/21/18 17:06 Active


 


 IV Insertion STAT Care  07/21/18 17:06 Active


 


 Oxygen-ED Only NON-REBREATHER 100% Care  07/21/18 17:06 Active


 


 CHEST 2 VIEWS (PA AND LAT) Routine Exams  07/21/18 18:51 Taken


 


 ARTERIAL BLOOD GASES Stat Lab  07/21/18 17:18 Completed


 


 CBC W DIFF Stat Lab  07/21/18 17:15 Completed


 


 CMP Stat Lab  07/21/18 17:15 Completed


 


 D-DIMER QUANTITATION Stat Lab  07/21/18 17:15 Completed


 


 Lactic Acid Stat Lab  07/21/18 17:18 Completed


 


 Manual Differential NC Stat Lab  07/21/18 17:15 Completed


 


 NT PRO BNP Stat Lab  07/21/18 17:15 Completed


 


 TROPONIN Q3H Lab  07/21/18 17:15 Completed


 


 TROPONIN Q3H Lab  07/21/18 20:15 Ordered


 


 TROPONIN Q3H Lab  07/21/18 23:15 Ordered


 


 TROPONIN Q3H Lab  07/22/18 02:15 Ordered


 


 TROPONIN Q3H Lab  07/22/18 05:15 Ordered


 


 Respiratory Nebulizer STAT RT  07/21/18 17:08 Completed








Medication Summary














Discontinued Medications














Generic Name Dose Route Start Last Admin





  Trade Name Freq  PRN Reason Stop Dose Admin


 


Albuterol/Ipratropium  Confirm  07/21/18 16:57  





  Duoneb 0.5-3 Mg/3 Ml Neb**  Administered  07/21/18 16:58  





  Dose   





  3 ml   





  IH   





  .STK-MED ONE   





     





     





     





     


 


Albuterol/Ipratropium  3 ml  07/21/18 17:06  07/21/18 17:05





  Duoneb 0.5-3 Mg/3 Ml Neb**  IH  07/21/18 17:07  3 ml





  STAT ONE   Administration





     





     





     





     


 


Methylprednisolone Sodium Succinate  125 mg  07/21/18 17:06  07/21/18 17:19





  Solu-Medrol 125 Mg***  IV  07/21/18 17:07  125 mg





  STAT ONE   Administration





     





     





     





     


 


Methylprednisolone Sodium Succinate  Confirm  07/21/18 17:18  





  Solu-Medrol 125 Mg***  Administered  07/21/18 17:19  





  Dose   





  125 mg   





  .ROUTE   





  .STK-MED ONE   





     





     





     





     











Lab/Rad Data: 


 Laboratory Result Diagrams





 07/21/18 17:15 





 07/21/18 17:15 





 Laboratory Results











  07/21/18 07/21/18 07/21/18 Range/Units





  17:18 17:15 17:15 


 


WBC     (4.0-10.5)  K/mm3


 


RBC     (4.1-5.4)  M/mm3


 


Hgb     (12.0-16.0)  gm/dl


 


Hct     (35-47)  %


 


MCV     ()  fl


 


MCH     (26-32)  pg


 


MCHC     (32-36)  g/dl


 


RDW     (11.5-14.0)  %


 


Plt Count     (150-450)  K/mm3


 


MPV     (6-9.5)  fl


 


Absolute Granulocytes     (1.4-6.9)  


 


D-Dimer    845 H*  (215-500)  ng/mL


 


Puncture Site  LEFT RADIAL    


 


pCO2  67 H*    (35-45)  mmHg


 


pO2  73 L    ()  mmHg


 


Base Excess  8.7 H    (-2.0-2.0)  


 


O2 Saturation  93.8 L    ()  g/dF


 


ABG pH  7.34 L    (7.35-7.45)  


 


ABG HCO3  36.1 H*    (22-28)  


 


ABG O2 Sat (Measured)  96.9    ()  %


 


Logan Test  YES    


 


A-a Gradient  271    


 


a/A Ratio  0.21    


 


Hemoglobin  9.3    


 


Carboxyhemoglobin  2.2    (0.0-6.9)  % THgb


 


Methemoglobin  1.0 L    (1.4-1.5)  %


 


Temperature  37.0    C


 


POC O2 Flow Rate  60    %


 


Sodium     (137-145)  mmol/L


 


Potassium  5.2 H    (3.5-5.1)  mmol/L


 


Chloride     ()  mmol/L


 


Carbon Dioxide     (22-30)  mmol/L


 


Anion Gap     (5-15)  MEQ/L


 


BUN     (7-17)  mg/dL


 


Creatinine     (0.52-1.04)  mg/dL


 


Estimated GFR     ML/MIN


 


Glucose     ()  mg/dL


 


Lactic Acid  1.8    (0.4-2.0)  


 


Calcium     (8.4-10.2)  mg/dL


 


Total Bilirubin     (0.2-1.3)  mg/dL


 


AST     (14-36)  U/L


 


ALT     (0-35)  U/L


 


Alkaline Phosphatase     ()  U/L


 


Troponin I   < 0.012   (0.000-0.034)  ng/mL


 


NT-Pro-B Natriuret Pep     (0-900)  pg/mL


 


Serum Total Protein     (6.3-8.2)  g/dL


 


Albumin     (3.5-5.0)  g/dL














  07/21/18 07/21/18 Range/Units





  17:15 17:15 


 


WBC   19.2 H  (4.0-10.5)  K/mm3


 


RBC   3.10 L  (4.1-5.4)  M/mm3


 


Hgb   9.2 L  (12.0-16.0)  gm/dl


 


Hct   30.6 L  (35-47)  %


 


MCV   98.7  ()  fl


 


MCH   29.6  (26-32)  pg


 


MCHC   30.1 L  (32-36)  g/dl


 


RDW   14.9 H  (11.5-14.0)  %


 


Plt Count   502 H  (150-450)  K/mm3


 


MPV   8.8  (6-9.5)  fl


 


Absolute Granulocytes   17.79 H  (1.4-6.9)  


 


D-Dimer    (215-500)  ng/mL


 


Puncture Site    


 


pCO2    (35-45)  mmHg


 


pO2    ()  mmHg


 


Base Excess    (-2.0-2.0)  


 


O2 Saturation    ()  g/dF


 


ABG pH    (7.35-7.45)  


 


ABG HCO3    (22-28)  


 


ABG O2 Sat (Measured)    ()  %


 


Logan Test    


 


A-a Gradient    


 


a/A Ratio    


 


Hemoglobin    


 


Carboxyhemoglobin    (0.0-6.9)  % THgb


 


Methemoglobin    (1.4-1.5)  %


 


Temperature    C


 


POC O2 Flow Rate    %


 


Sodium  128 L   (137-145)  mmol/L


 


Potassium  5.6 H   (3.5-5.1)  mmol/L


 


Chloride  86 L   ()  mmol/L


 


Carbon Dioxide  33 H   (22-30)  mmol/L


 


Anion Gap  13.7   (5-15)  MEQ/L


 


BUN  10   (7-17)  mg/dL


 


Creatinine  0.91   (0.52-1.04)  mg/dL


 


Estimated GFR  > 60.0   ML/MIN


 


Glucose  138 H   ()  mg/dL


 


Lactic Acid    (0.4-2.0)  


 


Calcium  9.0   (8.4-10.2)  mg/dL


 


Total Bilirubin  0.30   (0.2-1.3)  mg/dL


 


AST  15   (14-36)  U/L


 


ALT  15   (0-35)  U/L


 


Alkaline Phosphatase  152 H   ()  U/L


 


Troponin I    (0.000-0.034)  ng/mL


 


NT-Pro-B Natriuret Pep  1020 H   (0-900)  pg/mL


 


Serum Total Protein  7.5   (6.3-8.2)  g/dL


 


Albumin  4.0   (3.5-5.0)  g/dL














- Progress


Progress: improved


Air Movement: fair


Blood Culture(s) Obtained: Yes


Antibiotics given: Yes


Discussed with : Alonso


Will see patient in: hospital (full admit)


Counseled pt/family regarding: lab results, diagnosis, rad results





- Departure


Time of Disposition: 19:40


Departure Disposition: In-patient Admission (ICU per Dr Gupta)


Clinical Impression: 


 Dyspnea and respiratory abnormalities, Hyponatremia, Hyperkalemia





Condition: Stable


Critical Care Time: No


Referrals: 


DOMINICK WONG [Primary Care Provider] -

## 2018-07-22 VITALS — SYSTOLIC BLOOD PRESSURE: 108 MMHG | DIASTOLIC BLOOD PRESSURE: 58 MMHG | OXYGEN SATURATION: 98 %

## 2018-07-22 VITALS — HEART RATE: 58 BPM

## 2018-07-22 LAB
ALBUMIN SERPL-MCNC: 3.5 G/DL (ref 3.5–5)
ALP SERPL-CCNC: 120 U/L (ref 38–126)
ALT SERPL-CCNC: 13 U/L (ref 0–35)
ANION GAP SERPL CALC-SCNC: 10.6 MEQ/L (ref 5–15)
ARTERIAL PATENCY WRIST A: (no result)
ARTERIAL PATENCY WRIST A: (no result)
AST SERPL QL: 9 U/L (ref 14–36)
BASE EXCESS BLDA CALC-SCNC: 7.4 MMOL/L (ref -2–2)
BASE EXCESS BLDA CALC-SCNC: 7.7 MMOL/L (ref -2–2)
BILIRUB BLD-MCNC: 0.1 MG/DL (ref 0.2–1.3)
BUN SERPL-MCNC: 13 MG/DL (ref 7–17)
CALCIUM SPEC-MCNC: 8.2 MG/DL (ref 8.4–10.2)
CHLORIDE SERPL-SCNC: 88 MMOL/L (ref 98–107)
CO2 SERPL-SCNC: 32 MMOL/L (ref 22–30)
COHGB BLD-MCNC: 1.8 % THGB (ref 0–6.9)
COHGB BLD-MCNC: 2.1 % THGB (ref 0–6.9)
CREAT SERPL-MCNC: 0.91 MG/DL (ref 0.52–1.04)
GAS PNL BLDA: 37 C
GAS PNL BLDA: 37 C
GAS PNL BLDA: 8.7
GAS PNL BLDA: 8.9
GLUCOSE SERPL-MCNC: 221 MG/DL (ref 74–106)
HCO3 BLDA-SCNC: 35.7 MMOL/L (ref 22–28)
HCO3 BLDA-SCNC: 36.9 MMOL/L (ref 22–28)
INHALED O2 CONCENTRATION: 50 %
INHALED O2 CONCENTRATION: 50 %
METHGB MFR BLDA: 1 % (ref 1.4–1.5)
METHGB MFR BLDA: 1.1 % (ref 1.4–1.5)
O2 A-A PPRESDIFF RESPIRATORY: 172 MM[HG]
O2 A-A PPRESDIFF RESPIRATORY: 199 MM[HG]
PCO2 BLDA: 76 MMHG (ref 35–45)
PCO2 BLDA: 86 MMHG (ref 35–45)
PO2 BLDA: 63 MMHG (ref 75–100)
PO2 BLDA: 77 MMHG (ref 75–100)
POTASSIUM BLDA-SCNC: 5.9 MMOL/L (ref 3.5–5.1)
POTASSIUM BLDA-SCNC: 6 MMOL/L (ref 3.5–5.1)
POTASSIUM SERPLBLD-SCNC: 5.8 MMOL/L (ref 3.5–5.1)
PROT SERPL-MCNC: 6.7 G/DL (ref 6.3–8.2)
SAO2 % BLDA FROM PO2: 0.24 %
SAO2 % BLDA FROM PO2: 0.31 %
SAO2 % BLDA: 90.7 G/DF (ref 94–100)
SAO2 % BLDA: 93.4 % (ref 95–100)
SAO2 % BLDA: 93.4 G/DF (ref 94–100)
SAO2 % BLDA: 96.3 % (ref 95–100)
SODIUM SERPL-SCNC: 126 MMOL/L (ref 137–145)
SPECIMEN SOURCE: (no result)
SPECIMEN SOURCE: (no result)

## 2018-07-22 RX ADMIN — FLUTICASONE PROPIONATE AND SALMETEROL XINAFOATE SCH: 230; 21 AEROSOL, METERED RESPIRATORY (INHALATION) at 07:30

## 2018-07-22 NOTE — XRAY
Indication: Short of breath.



Comparison: February 13, 2017.



PA/lateral chest again demonstrates cardiomegaly with bibasilar effusions and

new hazy airspace disease, left greater than right.  Bony thorax intact again

with mild degenerative changes and old right rib fractures.

## 2018-07-22 NOTE — XRAY
Indication: Short of breath.  Elevated d-dimer.



Multiple contiguous axial images obtained through the chest using 80 cc Isovue

370 contrast and PE protocol.



Comparison: January 20, 2017.



There is satisfactory opacification of the pulmonary arteries.  Mild

respiration artifact.  No filling defect or pulmonary embolus.  Heart remains

enlarged.  Aorta minimally calcified without aneurysm/dissection.  Stable

small mediastinal lymph nodes again largest right subcarinal.



Examination of the lung parenchyma again demonstrates small bilateral

effusions and moderate bibasilar dependent atelectasis.  There also remains

diffuse patchy left lung hazy airspace opacities greatest in the upper lobe.



Bony thorax intact again with minimal degenerative changes throughout the

spine and old right rib fractures.



Limited upper abdomen again demonstrates mild fatty liver and 12.5 cm

splenomegaly.



Impression:

1.  Pulmonary embolus evaluation limited by respiration artifact.  No obvious

central pulmonary embolus.

2.  Again recurrent left lung airspace disease with small effusion.

3.  Stable cardiomegaly, bibasilar atelectasis, tiny right effusion, fatty

liver, and splenomegaly.



Comment: Preliminary interpretation was made by [facility].  No critical discrepancy.



CTDI 28.13

## 2018-07-22 NOTE — PCM.SSS
History of Present Illness





- Chief Complaint


Chief Complaint: Respiratory abnormalities, hyponatremia, high K+


History of Present Illness: 


 is a 58 year old female who presented to the ER last night with 

shortness of breath, cough and low oxygen saturation. Most of the history is 

taken from nursing report and ER records due to her clinical condition. She had 

an oxygen saturation of 67% when she arrived on 4L oxygen, workup has revealed 

bilateral diffuse infiltrates. She was slightly obtunded this morning so has 

been placed on bipap, pulmonary consult was requested and on call Dr Lyons 

made bipap recommendations and requested transfer.








- Review of Systems


Constitutional: No Fever, No Chills


Respiratory: Cough, Short Of Breath


Cardiac: No Symptoms


Abdominal/Gastrointestinal: No Abdominal Pain, No Nausea, No Vomiting, No 

Diarrhea


Musculoskeletal: No Back Pain, No Neck Pain


Skin: No Rash


Neurological: No Dizziness, No Focal Weakness, No Sensory Changes


All Other Systems: Unable due to condition





Medications & Allergies


Home Medications: 


 Home Medication List





Escitalopram Oxalate 10 mg** [Lexapro 10 MG**] 20 mg PO DAILY 12/17/15 [History 

Confirmed 07/21/18]


Gabapentin 300 mg PO BID 12/17/15 [History Confirmed 07/21/18]


Simethicone 80 mg*** [Mylicon 80MG***] 80 mg PO QID PRN 12/17/15 [History 

Confirmed 07/21/18]


Alprazolam 1 mg*** [Xanax 1 mg***] 1 mg PO TID #90 tablet 12/18/15 [Rx 

Confirmed 07/21/18]


Amlodipine Besylate 10 mg [Norvasc 10 MG] 10 mg PO DAILY 07/13/16 [History 

Confirmed 07/21/18]


Ferrous Sulfate 325 mg PO BID 07/13/16 [History Confirmed 07/21/18]


Lisinopril [Zestril] 40 mg PO DAILY 07/13/16 [History Confirmed 07/21/18]


Budesonide/Formoterol Fumarate [Symbicort 160-4.5 Mcg Inhaler] 6 gm IH BID 01/21 /17 [History Confirmed 07/21/18]


Hydrochlorothiazide 25 mg*** [hydroDIURIL 25 MG***] 25 mg PO DAILY 07/21/18 [

History Confirmed 07/21/18]


Metformin HCl 500 mg*** [Glucophage 500 MG***] 500 mg PO BID 07/21/18 [History 

Confirmed 07/21/18]


Potassium Chloride 10 Meq Tab* [Klor Con 10 MEQ***] 20 meq PO BID 07/21/18 [

History Confirmed 07/21/18]


Quetiapine Fumarate 25 mg*** [Seroquel 25 MG***] 25 mg PO HS 07/21/18 [History 

Confirmed 07/21/18]


Ranitidine HCl [Zantac] 300 mg PO DAILY 07/21/18 [History Confirmed 07/21/18]








Allergies/Adverse Reactions: 


 Allergies











Allergy/AdvReac Type Severity Reaction Status Date / Time


 


No Known Drug Allergies Allergy   Verified 02/28/17 20:44














- Past Medical History


Past Medical History: Yes


Neurological History: Seizures


ENT History: No Pertinent History


Cardiac History: Hypertension


Respiratory History: COPD, Pneumonia


Endocrine Medical History: No Pertinent History


Musculoskelatal History: Osteoarthritis


GI Medical History: Gallbladder Disease, Hernia, Other


 History: Renal Disease


Pyscho-Social History: Anxiety, Depression


Reproductive Disorders: No Pertinent History


Comment: PERF BOWEL 4/2014. pt states she had a seizure because she ran out of 

xanax, pt states she does not remember it, [facility]  

Pt states that her kidneys leak protein.





- Female History


Are you pregnant now?: No





- Past Surgical History


Past Surgical History: Yes


Neuro Surgical History: No Pertinent History


Cardiac History: No Pertinent History


Respiratory Surgery: No Pertinent History


GI Surgical History: Cholecystectomy, Colon Resection


Genitourinary Surgical Hx: No Pertinent History


Musculskeletal Surgical Hx: No Pertinent History


Female Surgical History: No Pertinent History





- Social History


Smoking Status: Former smoker


How long have you smoked: 35


Exposure to second hand smoke: No


Alcohol: None


Drug Use: none





- Physical Exam


Vital Signs: 


 Vital Signs - 24 hr











  Temp Pulse Resp BP Pulse Ox


 


 07/22/18 07:37  97.8 F  62  20  106/56  95


 


 07/22/18 04:00   67  17  114/58  91 L


 


 07/22/18 00:01   78   


 


 07/22/18 00:00  98.5 F  78  20  128/56  94 L


 


 07/21/18 23:55      93 L


 


 07/21/18 22:00  99.0 F  76  19  127/63  96


 


 07/21/18 20:45   81  19   97


 


 07/21/18 19:56   80  20  152/75  95


 


 07/21/18 19:47      97


 


 07/21/18 18:52   86  20  128/66  94 L


 


 07/21/18 17:52   84  24  125/68  95


 


 07/21/18 17:43  98.6 F  84  22  136/86  89 L


 


 07/21/18 17:08   82  24   97


 


 07/21/18 16:56  98.8 F  86  26 H  137/66  97








 Oxygen-Last 24 hours











O2 Percentage                  50%


 


O2 Percentage                  80%


 


O2 Percentage                  50%


 


O2 Percentage                  50%


 


O2 Percentage                  50%


 


O2 Percentage                  100%


 


O2 Percentage                  4 Liters = 36%


 


Oxygen Flowrate (L/min)-RT     10


 


Oxygen Flowrate (L/min)-RT     8














General Appearance: obese, other (sleepy but arousable)


Neurologic Exam: cooperative, No alert


Eye Exam: PERRL/EOMI


Respiratory Exam: crackles/rales, rhonchi


Cardiovascular Exam: regular rate/rhythm, normal heart sounds, normal 

peripheral pulses


Gastrointestinal/Abdomen Exam: soft, normal bowel sounds, No tenderness, No mass


Extremity Exam: normal inspection, normal range of motion, pelvis stable


Skin Exam: normal color, warm, dry, No rash





Results





- Labs


Lab/Micro Results: 


 Lab Results-Last 24 Hours











  07/21/18 07/21/18 07/21/18 Range/Units





  17:15 17:15 17:15 


 


WBC  19.2 H    (4.0-10.5)  K/mm3


 


RBC  3.10 L    (4.1-5.4)  M/mm3


 


Hgb  9.2 L    (12.0-16.0)  gm/dl


 


Hct  30.6 L    (35-47)  %


 


MCV  98.7    ()  fl


 


MCH  29.6    (26-32)  pg


 


MCHC  30.1 L    (32-36)  g/dl


 


RDW  14.9 H    (11.5-14.0)  %


 


Plt Count  502 H    (150-450)  K/mm3


 


MPV  8.8    (6-9.5)  fl


 


Absolute Granulocytes  17.79 H    (1.4-6.9)  


 


D-Dimer    845 H*  (215-500)  ng/mL


 


Puncture Site     


 


pCO2     (35-45)  mmHg


 


pO2     ()  mmHg


 


Base Excess     (-2.0-2.0)  


 


O2 Saturation     ()  g/dF


 


ABG pH     (7.35-7.45)  


 


ABG HCO3     (22-28)  


 


ABG O2 Sat (Measured)     ()  %


 


Logan Test     


 


A-a Gradient     


 


a/A Ratio     


 


Hemoglobin     


 


Carboxyhemoglobin     (0.0-6.9)  % THgb


 


Methemoglobin     (1.4-1.5)  %


 


Temperature     C


 


POC O2 Flow Rate     %


 


Vent Mode     


 


Inspiratory BiPAP     


 


Expiratory BiPAP     


 


Sodium   128 L   (137-145)  mmol/L


 


Potassium   5.6 H   (3.5-5.1)  mmol/L


 


Chloride   86 L   ()  mmol/L


 


Carbon Dioxide   33 H   (22-30)  mmol/L


 


Anion Gap   13.7   (5-15)  MEQ/L


 


BUN   10   (7-17)  mg/dL


 


Creatinine   0.91   (0.52-1.04)  mg/dL


 


Estimated GFR   > 60.0   ML/MIN


 


Glucose   138 H   ()  mg/dL


 


Lactic Acid     (0.4-2.0)  


 


Calcium   9.0   (8.4-10.2)  mg/dL


 


Total Bilirubin   0.30   (0.2-1.3)  mg/dL


 


AST   15   (14-36)  U/L


 


ALT   15   (0-35)  U/L


 


Alkaline Phosphatase   152 H   ()  U/L


 


Troponin I     (0.000-0.034)  ng/mL


 


NT-Pro-B Natriuret Pep   1020 H   (0-900)  pg/mL


 


Serum Total Protein   7.5   (6.3-8.2)  g/dL


 


Albumin   4.0   (3.5-5.0)  g/dL














  07/21/18 07/21/18 07/21/18 Range/Units





  17:15 17:18 20:15 


 


WBC     (4.0-10.5)  K/mm3


 


RBC     (4.1-5.4)  M/mm3


 


Hgb     (12.0-16.0)  gm/dl


 


Hct     (35-47)  %


 


MCV     ()  fl


 


MCH     (26-32)  pg


 


MCHC     (32-36)  g/dl


 


RDW     (11.5-14.0)  %


 


Plt Count     (150-450)  K/mm3


 


MPV     (6-9.5)  fl


 


Absolute Granulocytes     (1.4-6.9)  


 


D-Dimer     (215-500)  ng/mL


 


Puncture Site   LEFT RADIAL   


 


pCO2   67 H*   (35-45)  mmHg


 


pO2   73 L   ()  mmHg


 


Base Excess   8.7 H   (-2.0-2.0)  


 


O2 Saturation   93.8 L   ()  g/dF


 


ABG pH   7.34 L   (7.35-7.45)  


 


ABG HCO3   36.1 H*   (22-28)  


 


ABG O2 Sat (Measured)   96.9   ()  %


 


Logan Test   YES   


 


A-a Gradient   271   


 


a/A Ratio   0.21   


 


Hemoglobin   9.3   


 


Carboxyhemoglobin   2.2   (0.0-6.9)  % THgb


 


Methemoglobin   1.0 L   (1.4-1.5)  %


 


Temperature   37.0   C


 


POC O2 Flow Rate   60   %


 


Vent Mode     


 


Inspiratory BiPAP     


 


Expiratory BiPAP     


 


Sodium     (137-145)  mmol/L


 


Potassium   5.2 H   (3.5-5.1)  mmol/L


 


Chloride     ()  mmol/L


 


Carbon Dioxide     (22-30)  mmol/L


 


Anion Gap     (5-15)  MEQ/L


 


BUN     (7-17)  mg/dL


 


Creatinine     (0.52-1.04)  mg/dL


 


Estimated GFR     ML/MIN


 


Glucose     ()  mg/dL


 


Lactic Acid   1.8   (0.4-2.0)  


 


Calcium     (8.4-10.2)  mg/dL


 


Total Bilirubin     (0.2-1.3)  mg/dL


 


AST     (14-36)  U/L


 


ALT     (0-35)  U/L


 


Alkaline Phosphatase     ()  U/L


 


Troponin I  < 0.012   < 0.012  (0.000-0.034)  ng/mL


 


NT-Pro-B Natriuret Pep     (0-900)  pg/mL


 


Serum Total Protein     (6.3-8.2)  g/dL


 


Albumin     (3.5-5.0)  g/dL














  07/21/18 07/22/18 07/22/18 Range/Units





  23:15 02:15 04:57 


 


WBC     (4.0-10.5)  K/mm3


 


RBC     (4.1-5.4)  M/mm3


 


Hgb     (12.0-16.0)  gm/dl


 


Hct     (35-47)  %


 


MCV     ()  fl


 


MCH     (26-32)  pg


 


MCHC     (32-36)  g/dl


 


RDW     (11.5-14.0)  %


 


Plt Count     (150-450)  K/mm3


 


MPV     (6-9.5)  fl


 


Absolute Granulocytes     (1.4-6.9)  


 


D-Dimer     (215-500)  ng/mL


 


Puncture Site    RIGHT BRACHIAL  


 


pCO2    76 H*  (35-45)  mmHg


 


pO2    63 L  ()  mmHg


 


Base Excess    7.4 H  (-2.0-2.0)  


 


O2 Saturation    90.7 L  ()  g/dF


 


ABG pH    7.28 L  (7.35-7.45)  


 


ABG HCO3    35.7 H*  (22-28)  


 


ABG O2 Sat (Measured)    93.4 L  ()  %


 


Logan Test    NOT APPLICABLE  


 


A-a Gradient    199  


 


a/A Ratio    0.24  


 


Hemoglobin    8.7  


 


Carboxyhemoglobin    1.8  (0.0-6.9)  % THgb


 


Methemoglobin    1.1 L  (1.4-1.5)  %


 


Temperature    37.0  C


 


POC O2 Flow Rate    50  %


 


Vent Mode     


 


Inspiratory BiPAP     


 


Expiratory BiPAP     


 


Sodium     (137-145)  mmol/L


 


Potassium    5.9 H  (3.5-5.1)  mmol/L


 


Chloride     ()  mmol/L


 


Carbon Dioxide     (22-30)  mmol/L


 


Anion Gap     (5-15)  MEQ/L


 


BUN     (7-17)  mg/dL


 


Creatinine     (0.52-1.04)  mg/dL


 


Estimated GFR     ML/MIN


 


Glucose     ()  mg/dL


 


Lactic Acid     (0.4-2.0)  


 


Calcium     (8.4-10.2)  mg/dL


 


Total Bilirubin     (0.2-1.3)  mg/dL


 


AST     (14-36)  U/L


 


ALT     (0-35)  U/L


 


Alkaline Phosphatase     ()  U/L


 


Troponin I  < 0.012  < 0.012   (0.000-0.034)  ng/mL


 


NT-Pro-B Natriuret Pep     (0-900)  pg/mL


 


Serum Total Protein     (6.3-8.2)  g/dL


 


Albumin     (3.5-5.0)  g/dL














  07/22/18 07/22/18 07/22/18 Range/Units





  05:38 05:38 06:24 


 


WBC     (4.0-10.5)  K/mm3


 


RBC     (4.1-5.4)  M/mm3


 


Hgb     (12.0-16.0)  gm/dl


 


Hct     (35-47)  %


 


MCV     ()  fl


 


MCH     (26-32)  pg


 


MCHC     (32-36)  g/dl


 


RDW     (11.5-14.0)  %


 


Plt Count     (150-450)  K/mm3


 


MPV     (6-9.5)  fl


 


Absolute Granulocytes     (1.4-6.9)  


 


D-Dimer     (215-500)  ng/mL


 


Puncture Site    LEFT RADIAL  


 


pCO2    86 H*  (35-45)  mmHg


 


pO2    77  ()  mmHg


 


Base Excess    7.7 H  (-2.0-2.0)  


 


O2 Saturation    93.4 L  ()  g/dF


 


ABG pH    7.24 L*  (7.35-7.45)  


 


ABG HCO3    36.9 H*  (22-28)  


 


ABG O2 Sat (Measured)    96.3  ()  %


 


Logan Test    NOT APPLICABLE  


 


A-a Gradient    172  


 


a/A Ratio    0.31  


 


Hemoglobin    8.9  


 


Carboxyhemoglobin    2.1  (0.0-6.9)  % THgb


 


Methemoglobin    1.0 L  (1.4-1.5)  %


 


Temperature    37.0  C


 


POC O2 Flow Rate    50  %


 


Vent Mode    BiPAP  


 


Inspiratory BiPAP    14  


 


Expiratory BiPAP    6  


 


Sodium   126 L   (137-145)  mmol/L


 


Potassium   5.8 H  6.0 H  (3.5-5.1)  mmol/L


 


Chloride   88 L   ()  mmol/L


 


Carbon Dioxide   32 H   (22-30)  mmol/L


 


Anion Gap   10.6   (5-15)  MEQ/L


 


BUN   13   (7-17)  mg/dL


 


Creatinine   0.91   (0.52-1.04)  mg/dL


 


Estimated GFR   > 60.0   ML/MIN


 


Glucose   221 H   ()  mg/dL


 


Lactic Acid     (0.4-2.0)  


 


Calcium   8.2 L   (8.4-10.2)  mg/dL


 


Total Bilirubin   0.10 L   (0.2-1.3)  mg/dL


 


AST   9 L   (14-36)  U/L


 


ALT   13   (0-35)  U/L


 


Alkaline Phosphatase   120   ()  U/L


 


Troponin I  < 0.012    (0.000-0.034)  ng/mL


 


NT-Pro-B Natriuret Pep     (0-900)  pg/mL


 


Serum Total Protein   6.7   (6.3-8.2)  g/dL


 


Albumin   3.5   (3.5-5.0)  g/dL














- Radiology Impressions


Radiology Exams & Impressions: 


 Radiology Procedures











 Category Date Time Status


 


 CHEST 2 VIEWS (PA AND LAT) Routine Exams  07/21/18 18:51 Taken


 


 CHEST WITH CONTRAST [CT] Stat Exams  07/21/18 21:00 Taken














- Other Procedures and Tests


 Respiratory Therapy





07/21/18 19:00


Respiratory MDI BID 





07/21/18 21:39


Respiratory Nebulizer PRN 


Respiratory Nebulizer PRN 





07/21/18 23:56


Oxygen OXYMIZER-LPM 9% 





07/22/18 05:05


BiPap/CPAP STAT 














Assessment/Plan


(1) Acute on chronic respiratory failure


Current Visit: Yes   Status: Acute   


Assessment & Plan: 


spoke with Dr Santhosh Bonds at [facility] hospitalist service who agrees to 

accept patient in transfer to [facility] ICU, will send on bipap as 

patient's clinical condition has improved since initiation of bipap and she is 

currently hemodynamically stable


Code(s): J96.20 - ACUTE AND CHR RESP FAILURE, UNSP W HYPOXIA OR HYPERCAPNIA   





(2) Pneumonia


Current Visit: No   Status: Acute   


Qualifiers: 


   Pneumonia type: due to other aerobic Gram-negative bacteria   Laterality: 

bilateral   Lung location: lower lobe of lung   Qualified Code(s): J15.6 - 

Pneumonia due to other Gram-negative bacteria   


Code(s): J18.9 - PNEUMONIA, UNSPECIFIED ORGANISM   





Hospital Summary





- Vitals & Intake/Output


Vital Signs: 


 Vital Signs











Temperature  97.8 F   07/22/18 07:37


 


Pulse Rate  62   07/22/18 07:37


 


Respiratory Rate  20   07/22/18 07:37


 


Blood Pressure  106/56   07/22/18 07:37


 


O2 Sat by Pulse Oximetry  95   07/22/18 07:37








 Oxygen-Last Documented











O2 Percentage                  50%














Intake & Output: 


 Intake & Output











 07/19/18 07/20/18 07/21/18 07/22/18





 11:59 11:59 11:59 11:59


 


Intake Total    1115


 


Balance    1115


 


Weight    116.8 kg














- Lab


Result Diagrams: 


 07/21/18 17:15





 07/22/18 05:38


Lab Results-Last 24 Hrs: 


 Lab Results-Last 24 Hours











  07/21/18 07/21/18 07/21/18 Range/Units





  17:15 17:15 17:15 


 


WBC  19.2 H    (4.0-10.5)  K/mm3


 


RBC  3.10 L    (4.1-5.4)  M/mm3


 


Hgb  9.2 L    (12.0-16.0)  gm/dl


 


Hct  30.6 L    (35-47)  %


 


MCV  98.7    ()  fl


 


MCH  29.6    (26-32)  pg


 


MCHC  30.1 L    (32-36)  g/dl


 


RDW  14.9 H    (11.5-14.0)  %


 


Plt Count  502 H    (150-450)  K/mm3


 


MPV  8.8    (6-9.5)  fl


 


Absolute Granulocytes  17.79 H    (1.4-6.9)  


 


D-Dimer    845 H*  (215-500)  ng/mL


 


Puncture Site     


 


pCO2     (35-45)  mmHg


 


pO2     ()  mmHg


 


Base Excess     (-2.0-2.0)  


 


O2 Saturation     ()  g/dF


 


ABG pH     (7.35-7.45)  


 


ABG HCO3     (22-28)  


 


ABG O2 Sat (Measured)     ()  %


 


Logan Test     


 


A-a Gradient     


 


a/A Ratio     


 


Hemoglobin     


 


Carboxyhemoglobin     (0.0-6.9)  % THgb


 


Methemoglobin     (1.4-1.5)  %


 


Temperature     C


 


POC O2 Flow Rate     %


 


Vent Mode     


 


Inspiratory BiPAP     


 


Expiratory BiPAP     


 


Sodium   128 L   (137-145)  mmol/L


 


Potassium   5.6 H   (3.5-5.1)  mmol/L


 


Chloride   86 L   ()  mmol/L


 


Carbon Dioxide   33 H   (22-30)  mmol/L


 


Anion Gap   13.7   (5-15)  MEQ/L


 


BUN   10   (7-17)  mg/dL


 


Creatinine   0.91   (0.52-1.04)  mg/dL


 


Estimated GFR   > 60.0   ML/MIN


 


Glucose   138 H   ()  mg/dL


 


Lactic Acid     (0.4-2.0)  


 


Calcium   9.0   (8.4-10.2)  mg/dL


 


Total Bilirubin   0.30   (0.2-1.3)  mg/dL


 


AST   15   (14-36)  U/L


 


ALT   15   (0-35)  U/L


 


Alkaline Phosphatase   152 H   ()  U/L


 


Troponin I     (0.000-0.034)  ng/mL


 


NT-Pro-B Natriuret Pep   1020 H   (0-900)  pg/mL


 


Serum Total Protein   7.5   (6.3-8.2)  g/dL


 


Albumin   4.0   (3.5-5.0)  g/dL














  07/21/18 07/21/18 07/21/18 Range/Units





  17:15 17:18 20:15 


 


WBC     (4.0-10.5)  K/mm3


 


RBC     (4.1-5.4)  M/mm3


 


Hgb     (12.0-16.0)  gm/dl


 


Hct     (35-47)  %


 


MCV     ()  fl


 


MCH     (26-32)  pg


 


MCHC     (32-36)  g/dl


 


RDW     (11.5-14.0)  %


 


Plt Count     (150-450)  K/mm3


 


MPV     (6-9.5)  fl


 


Absolute Granulocytes     (1.4-6.9)  


 


D-Dimer     (215-500)  ng/mL


 


Puncture Site   LEFT RADIAL   


 


pCO2   67 H*   (35-45)  mmHg


 


pO2   73 L   ()  mmHg


 


Base Excess   8.7 H   (-2.0-2.0)  


 


O2 Saturation   93.8 L   ()  g/dF


 


ABG pH   7.34 L   (7.35-7.45)  


 


ABG HCO3   36.1 H*   (22-28)  


 


ABG O2 Sat (Measured)   96.9   ()  %


 


Logan Test   YES   


 


A-a Gradient   271   


 


a/A Ratio   0.21   


 


Hemoglobin   9.3   


 


Carboxyhemoglobin   2.2   (0.0-6.9)  % THgb


 


Methemoglobin   1.0 L   (1.4-1.5)  %


 


Temperature   37.0   C


 


POC O2 Flow Rate   60   %


 


Vent Mode     


 


Inspiratory BiPAP     


 


Expiratory BiPAP     


 


Sodium     (137-145)  mmol/L


 


Potassium   5.2 H   (3.5-5.1)  mmol/L


 


Chloride     ()  mmol/L


 


Carbon Dioxide     (22-30)  mmol/L


 


Anion Gap     (5-15)  MEQ/L


 


BUN     (7-17)  mg/dL


 


Creatinine     (0.52-1.04)  mg/dL


 


Estimated GFR     ML/MIN


 


Glucose     ()  mg/dL


 


Lactic Acid   1.8   (0.4-2.0)  


 


Calcium     (8.4-10.2)  mg/dL


 


Total Bilirubin     (0.2-1.3)  mg/dL


 


AST     (14-36)  U/L


 


ALT     (0-35)  U/L


 


Alkaline Phosphatase     ()  U/L


 


Troponin I  < 0.012   < 0.012  (0.000-0.034)  ng/mL


 


NT-Pro-B Natriuret Pep     (0-900)  pg/mL


 


Serum Total Protein     (6.3-8.2)  g/dL


 


Albumin     (3.5-5.0)  g/dL














  07/21/18 07/22/18 07/22/18 Range/Units





  23:15 02:15 04:57 


 


WBC     (4.0-10.5)  K/mm3


 


RBC     (4.1-5.4)  M/mm3


 


Hgb     (12.0-16.0)  gm/dl


 


Hct     (35-47)  %


 


MCV     ()  fl


 


MCH     (26-32)  pg


 


MCHC     (32-36)  g/dl


 


RDW     (11.5-14.0)  %


 


Plt Count     (150-450)  K/mm3


 


MPV     (6-9.5)  fl


 


Absolute Granulocytes     (1.4-6.9)  


 


D-Dimer     (215-500)  ng/mL


 


Puncture Site    RIGHT BRACHIAL  


 


pCO2    76 H*  (35-45)  mmHg


 


pO2    63 L  ()  mmHg


 


Base Excess    7.4 H  (-2.0-2.0)  


 


O2 Saturation    90.7 L  ()  g/dF


 


ABG pH    7.28 L  (7.35-7.45)  


 


ABG HCO3    35.7 H*  (22-28)  


 


ABG O2 Sat (Measured)    93.4 L  ()  %


 


Logan Test    NOT APPLICABLE  


 


A-a Gradient    199  


 


a/A Ratio    0.24  


 


Hemoglobin    8.7  


 


Carboxyhemoglobin    1.8  (0.0-6.9)  % THgb


 


Methemoglobin    1.1 L  (1.4-1.5)  %


 


Temperature    37.0  C


 


POC O2 Flow Rate    50  %


 


Vent Mode     


 


Inspiratory BiPAP     


 


Expiratory BiPAP     


 


Sodium     (137-145)  mmol/L


 


Potassium    5.9 H  (3.5-5.1)  mmol/L


 


Chloride     ()  mmol/L


 


Carbon Dioxide     (22-30)  mmol/L


 


Anion Gap     (5-15)  MEQ/L


 


BUN     (7-17)  mg/dL


 


Creatinine     (0.52-1.04)  mg/dL


 


Estimated GFR     ML/MIN


 


Glucose     ()  mg/dL


 


Lactic Acid     (0.4-2.0)  


 


Calcium     (8.4-10.2)  mg/dL


 


Total Bilirubin     (0.2-1.3)  mg/dL


 


AST     (14-36)  U/L


 


ALT     (0-35)  U/L


 


Alkaline Phosphatase     ()  U/L


 


Troponin I  < 0.012  < 0.012   (0.000-0.034)  ng/mL


 


NT-Pro-B Natriuret Pep     (0-900)  pg/mL


 


Serum Total Protein     (6.3-8.2)  g/dL


 


Albumin     (3.5-5.0)  g/dL














  07/22/18 07/22/18 07/22/18 Range/Units





  05:38 05:38 06:24 


 


WBC     (4.0-10.5)  K/mm3


 


RBC     (4.1-5.4)  M/mm3


 


Hgb     (12.0-16.0)  gm/dl


 


Hct     (35-47)  %


 


MCV     ()  fl


 


MCH     (26-32)  pg


 


MCHC     (32-36)  g/dl


 


RDW     (11.5-14.0)  %


 


Plt Count     (150-450)  K/mm3


 


MPV     (6-9.5)  fl


 


Absolute Granulocytes     (1.4-6.9)  


 


D-Dimer     (215-500)  ng/mL


 


Puncture Site    LEFT RADIAL  


 


pCO2    86 H*  (35-45)  mmHg


 


pO2    77  ()  mmHg


 


Base Excess    7.7 H  (-2.0-2.0)  


 


O2 Saturation    93.4 L  ()  g/dF


 


ABG pH    7.24 L*  (7.35-7.45)  


 


ABG HCO3    36.9 H*  (22-28)  


 


ABG O2 Sat (Measured)    96.3  ()  %


 


Logan Test    NOT APPLICABLE  


 


A-a Gradient    172  


 


a/A Ratio    0.31  


 


Hemoglobin    8.9  


 


Carboxyhemoglobin    2.1  (0.0-6.9)  % THgb


 


Methemoglobin    1.0 L  (1.4-1.5)  %


 


Temperature    37.0  C


 


POC O2 Flow Rate    50  %


 


Vent Mode    BiPAP  


 


Inspiratory BiPAP    14  


 


Expiratory BiPAP    6  


 


Sodium   126 L   (137-145)  mmol/L


 


Potassium   5.8 H  6.0 H  (3.5-5.1)  mmol/L


 


Chloride   88 L   ()  mmol/L


 


Carbon Dioxide   32 H   (22-30)  mmol/L


 


Anion Gap   10.6   (5-15)  MEQ/L


 


BUN   13   (7-17)  mg/dL


 


Creatinine   0.91   (0.52-1.04)  mg/dL


 


Estimated GFR   > 60.0   ML/MIN


 


Glucose   221 H   ()  mg/dL


 


Lactic Acid     (0.4-2.0)  


 


Calcium   8.2 L   (8.4-10.2)  mg/dL


 


Total Bilirubin   0.10 L   (0.2-1.3)  mg/dL


 


AST   9 L   (14-36)  U/L


 


ALT   13   (0-35)  U/L


 


Alkaline Phosphatase   120   ()  U/L


 


Troponin I  < 0.012    (0.000-0.034)  ng/mL


 


NT-Pro-B Natriuret Pep     (0-900)  pg/mL


 


Serum Total Protein   6.7   (6.3-8.2)  g/dL


 


Albumin   3.5   (3.5-5.0)  g/dL














- Radiology Exams


Ordered Rad Exams-Entire Visit: 


 Radiology Procedures











 Category Date Time Status


 


 CHEST 2 VIEWS (PA AND LAT) Routine Exams  07/21/18 18:51 Taken


 


 CHEST WITH CONTRAST [CT] Stat Exams  07/21/18 21:00 Taken














- Procedures and Test


Procedures and Tests throughout Hospitalization: 


 Therapy Orders & Screens





07/21/18 17:08


Respiratory Nebulizer STAT 


   Comment: 


   Diagnosis: Shortness of Breath





07/21/18 19:00


Respiratory MDI BID 


   Comment: ADVAIR 230/21 2 PUFFS BID


   Diagnosis: Shortness of Breath





07/21/18 21:00


Oxygen OXYMASK-LPM 10% 


   Comment: 


   Diagnosis: Shortness of Breath





07/21/18 21:39


Respiratory Nebulizer PRN 


   Comment: ALBUTEROL Q2PRN FOR SOB/WHEEZING


   Diagnosis: Shortness of Breath


Respiratory Nebulizer PRN 


   Comment: DUONEB Q4PRN FOR SOB/WHEEZING


   Diagnosis: Shortness of Breath





07/21/18 21:40


Oxygen OXYMASK-LPM 7% 


   Comment: 


   Diagnosis: Shortness of Breath


Respiratory Therapy Consult ROUTINE 


   Comment: 


   Reason For Exam: 


   Diagnosis: Shortness of Breath





07/21/18 23:41


RT Screen per Nursing Assess ONCE 


   Comment: Protocol Order


   Physician Instructions: Greater than 3 points order RT Admission Screen


   Reason For Exam: Triggered on Admission


   Diagnosis: Respiratory abnormalities, hyponatremia, high K+


   Diagnosis: Respiratory abnormalities, hyponatremia, high K+


   Pneumonia: No


   Home O2: Yes


   Asthma: No


   CHF: No


   Home CPAP/BIPAP: No


   Home Nebs/MDI: Yes


   Total Points: 10





07/21/18 23:56


Oxygen OXYMIZER-LPM 9% 


   Comment: 


   Diagnosis: Respiratory abnormalities, hyponatremia, high K+





07/22/18 05:05


BiPap/CPAP STAT 


   Comment: 


   Diagnosis: Respiratory abnormalities, hyponatremia, high K+














- Discharge


Disposition: DC TO [facility]


Condition: Serious


Prescriptions: 


No Action


   Gabapentin 300 mg PO BID


   Escitalopram Oxalate 10 mg** [Lexapro 10 MG**] 20 mg PO DAILY


   Simethicone 80 mg*** [Mylicon 80MG***] 80 mg PO QID PRN


     PRN Reason: Stomach Upset


   Alprazolam 1 mg*** [Xanax 1 mg***] 1 mg PO TID #90 tablet


   Lisinopril [Zestril] 40 mg PO DAILY


   Ferrous Sulfate 325 mg PO BID


   Amlodipine Besylate 10 mg [Norvasc 10 MG] 10 mg PO DAILY


   Budesonide/Formoterol Fumarate [Symbicort 160-4.5 Mcg Inhaler] 6 gm IH BID


   Metformin HCl 500 mg*** [Glucophage 500 MG***] 500 mg PO BID


   Hydrochlorothiazide 25 mg*** [hydroDIURIL 25 MG***] 25 mg PO DAILY


   Quetiapine Fumarate 25 mg*** [Seroquel 25 MG***] 25 mg PO HS


   Ranitidine HCl [Zantac] 300 mg PO DAILY


   Potassium Chloride 10 Meq Tab* [Klor Con 10 MEQ***] 20 meq PO BID


Follow up with: 


DOMINICK WONG [Primary Care Provider] - 1 Week

## 2018-08-18 ENCOUNTER — HOSPITAL ENCOUNTER (EMERGENCY)
Dept: HOSPITAL 33 - ED | Age: 58
Discharge: ANOTHER HOSPITAL | End: 2018-08-18
Payer: COMMERCIAL

## 2018-08-18 VITALS — DIASTOLIC BLOOD PRESSURE: 66 MMHG | OXYGEN SATURATION: 94 % | SYSTOLIC BLOOD PRESSURE: 137 MMHG | HEART RATE: 72 BPM

## 2018-08-18 DIAGNOSIS — R06.89: Primary | ICD-10-CM

## 2018-08-18 DIAGNOSIS — I10: ICD-10-CM

## 2018-08-18 DIAGNOSIS — F41.8: ICD-10-CM

## 2018-08-18 DIAGNOSIS — I50.9: ICD-10-CM

## 2018-08-18 DIAGNOSIS — J44.1: ICD-10-CM

## 2018-08-18 DIAGNOSIS — Z90.49: ICD-10-CM

## 2018-08-18 DIAGNOSIS — R06.02: ICD-10-CM

## 2018-08-18 DIAGNOSIS — Z72.0: ICD-10-CM

## 2018-08-18 DIAGNOSIS — M19.90: ICD-10-CM

## 2018-08-18 DIAGNOSIS — Z79.899: ICD-10-CM

## 2018-08-18 DIAGNOSIS — N28.9: ICD-10-CM

## 2018-08-18 LAB
ALBUMIN SERPL-MCNC: 3.6 G/DL (ref 3.5–5)
ALP SERPL-CCNC: 92 U/L (ref 38–126)
ALT SERPL-CCNC: 11 U/L (ref 0–35)
ANION GAP SERPL CALC-SCNC: 11.7 MEQ/L (ref 5–15)
APTT PPP: 40 SECONDS (ref 25.3–37)
ARTERIAL PATENCY WRIST A: YES
ARTERIAL PATENCY WRIST A: YES
AST SERPL QL: 9 U/L (ref 14–36)
BASE EXCESS BLDA CALC-SCNC: 13.1 MMOL/L (ref -2–2)
BASE EXCESS BLDA CALC-SCNC: 13.5 MMOL/L (ref -2–2)
BASO STIPL BLD QL SMEAR: (no result)
BILIRUB BLD-MCNC: 0.3 MG/DL (ref 0.2–1.3)
BNP SERPL-MCNC: 2220 PG/ML (ref 0–900)
BUN SERPL-MCNC: 15 MG/DL (ref 7–17)
CALCIUM SPEC-MCNC: 8.7 MG/DL (ref 8.4–10.2)
CELLS COUNTED: 100
CHLORIDE SERPL-SCNC: 89 MMOL/L (ref 98–107)
CO2 SERPL-SCNC: 40 MMOL/L (ref 22–30)
COHGB BLD-MCNC: 2.2 % THGB (ref 0–6.9)
COHGB BLD-MCNC: 2.8 % THGB (ref 0–6.9)
CREAT SERPL-MCNC: 0.76 MG/DL (ref 0.52–1.04)
D DIMER BLD IA.RAPID-MCNC: 846 NG/ML (ref 215–500)
GAS PNL BLDA: 37 C
GAS PNL BLDA: 37 C
GAS PNL BLDA: 8.4
GAS PNL BLDA: 8.8
GLUCOSE SERPL-MCNC: 127 MG/DL (ref 74–106)
GLUCOSE UR-MCNC: NEGATIVE MG/DL
GRANULOCYTES # BLD AUTO: 8.04 10*3/UL (ref 1.4–6.9)
HCO3 BLDA-SCNC: 41.7 MMOL/L (ref 22–28)
HCO3 BLDA-SCNC: 41.8 MMOL/L (ref 22–28)
HCT VFR BLD AUTO: 28.6 % (ref 35–47)
HGB BLD-MCNC: 8.5 GM/DL (ref 12–16)
INHALED O2 CONCENTRATION: 44 %
INHALED O2 CONCENTRATION: 50 %
INR PPP: 1.07 (ref 0.8–3)
MANUAL DIF COMMENT BLD-IMP: (no result)
MCH RBC QN AUTO: 29.9 PG (ref 26–32)
MCHC RBC AUTO-ENTMCNC: 29.7 G/DL (ref 32–36)
METHGB MFR BLDA: 0.2 % (ref 1.4–1.5)
METHGB MFR BLDA: 1.4 % (ref 1.4–1.5)
O2 A-A PPRESDIFF RESPIRATORY: 159 MM[HG]
O2 A-A PPRESDIFF RESPIRATORY: 170 MM[HG]
PCO2 BLDA: 81 MMHG (ref 35–45)
PCO2 BLDA: 85 MMHG (ref 35–45)
PLATELET # BLD AUTO: 544 K/MM3 (ref 150–450)
PO2 BLDA: 53 MMHG (ref 75–100)
PO2 BLDA: 80 MMHG (ref 75–100)
POIKILOCYTOSIS BLD QL SMEAR: (no result)
POLYCHROMASIA BLD QL SMEAR: (no result)
POTASSIUM BLDA-SCNC: 5 MMOL/L (ref 3.5–5.1)
POTASSIUM BLDA-SCNC: 5.4 MMOL/L (ref 3.5–5.1)
POTASSIUM SERPLBLD-SCNC: 5.1 MMOL/L (ref 3.5–5.1)
PROT SERPL-MCNC: 6.3 G/DL (ref 6.3–8.2)
PROT UR STRIP-MCNC: NEGATIVE MG/DL
RBC # BLD AUTO: 2.84 M/MM3 (ref 4.1–5.4)
SAO2 % BLDA FROM PO2: 0.25 %
SAO2 % BLDA FROM PO2: 0.32 %
SAO2 % BLDA: 87.3 G/DF (ref 94–100)
SAO2 % BLDA: 90 % (ref 95–100)
SAO2 % BLDA: 93.8 G/DF (ref 94–100)
SAO2 % BLDA: 97.3 % (ref 95–100)
SODIUM SERPL-SCNC: 136 MMOL/L (ref 137–145)
SPECIMEN SOURCE: (no result)
SPECIMEN SOURCE: (no result)
WBC # BLD AUTO: 9.6 K/MM3 (ref 4–10.5)

## 2018-08-18 PROCEDURE — 36600 WITHDRAWAL OF ARTERIAL BLOOD: CPT

## 2018-08-18 PROCEDURE — 83605 ASSAY OF LACTIC ACID: CPT

## 2018-08-18 PROCEDURE — 96374 THER/PROPH/DIAG INJ IV PUSH: CPT

## 2018-08-18 PROCEDURE — 87040 BLOOD CULTURE FOR BACTERIA: CPT

## 2018-08-18 PROCEDURE — 36415 COLL VENOUS BLD VENIPUNCTURE: CPT

## 2018-08-18 PROCEDURE — 85730 THROMBOPLASTIN TIME PARTIAL: CPT

## 2018-08-18 PROCEDURE — 85610 PROTHROMBIN TIME: CPT

## 2018-08-18 PROCEDURE — 96365 THER/PROPH/DIAG IV INF INIT: CPT

## 2018-08-18 PROCEDURE — 94640 AIRWAY INHALATION TREATMENT: CPT

## 2018-08-18 PROCEDURE — 99291 CRITICAL CARE FIRST HOUR: CPT

## 2018-08-18 PROCEDURE — 82375 ASSAY CARBOXYHB QUANT: CPT

## 2018-08-18 PROCEDURE — 93041 RHYTHM ECG TRACING: CPT

## 2018-08-18 PROCEDURE — 93005 ELECTROCARDIOGRAM TRACING: CPT

## 2018-08-18 PROCEDURE — 81002 URINALYSIS NONAUTO W/O SCOPE: CPT

## 2018-08-18 PROCEDURE — 85379 FIBRIN DEGRADATION QUANT: CPT

## 2018-08-18 PROCEDURE — 71045 X-RAY EXAM CHEST 1 VIEW: CPT

## 2018-08-18 PROCEDURE — 83880 ASSAY OF NATRIURETIC PEPTIDE: CPT

## 2018-08-18 PROCEDURE — 94002 VENT MGMT INPAT INIT DAY: CPT

## 2018-08-18 PROCEDURE — 80053 COMPREHEN METABOLIC PANEL: CPT

## 2018-08-18 PROCEDURE — 84484 ASSAY OF TROPONIN QUANT: CPT

## 2018-08-18 PROCEDURE — 85025 COMPLETE CBC W/AUTO DIFF WBC: CPT

## 2018-08-18 PROCEDURE — 51702 INSERT TEMP BLADDER CATH: CPT

## 2018-08-18 PROCEDURE — 82803 BLOOD GASES ANY COMBINATION: CPT

## 2018-08-18 PROCEDURE — 36000 PLACE NEEDLE IN VEIN: CPT

## 2018-08-18 PROCEDURE — 99285 EMERGENCY DEPT VISIT HI MDM: CPT

## 2018-08-18 NOTE — XRAY
Indication: Short of breath.



Comparison: July 21, 2018.



Portable chest again demonstrates diffuse bilateral hazy airspace disease

slightly improved on the left.  Stable small bilateral effusions and

cardiomegaly.  No new cardiopulmonary abnormalities.

## 2018-08-18 NOTE — ERPHSYRPT
- History of Present Illness


Time Seen by Provider: 08/18/18 12:40


Source: patient, family, EMS


Patient Subjective Stated Complaint: PT BROUGHT IN BY AMBULANCE FOR SOB FOR 

OVER A WEEK, JUST GOT OUT OF [facility] YESTERDAY FOR SAME THING, PT WAS GIVEN IN 

AMBUANCE A RESP TREATMENT, SOLUMEDEROL


Triage Nursing Assessment: PT ALERT, RESP LABORED AT TIMES, SKIN W/D/P. 

DIMINISHED BREATH SOUNDS. HAS 2 + EDEMA IN LOWER LEGS.  =


Physician History: 





58-year-old white female with history of seizures, COPD, high blood pressure, 

pneumonia, osteoarthritis, gallbladder disease, renal disease, anxiety, 

depression








Patient is brought by medics with complaint that patient is short of breath


Patient apparently had been at [facility] for the past 5 days she was a 

transferred to [facility].


Patient's daughter of requested that the patient be transferred to this 

hospital patient is complaining of shortness of breath the patient apparently 

is continuing edema to the lower extremities.





Patient apparently had received Solu-Medrol 125 mg IV, albuterol treatment 

prior to the arrival





She apparently had received 60 mg of Lasix by mouth at the nursing home.





Patient complains of shortness of breath she denies any pain.


Past medical history includes seizures, COPD, high blood pressure, pneumonia, 

osteoarthritis, gallbladder disease, hernia, renal disease, anxiety, depression

, perforated bowel.





Past surgical history includes cholecystectomy, colon resection.





Patient is a former smoker.


Timing/Duration: other (at [facility] past  5 days, noted to be short of 

breath at nursing home today)


Activities at Onset: none


Severity of Dyspnea-Max: moderate


Severity of Dyspnea-Current: moderate


Possible Cause: frequent episodes


Modifying Factors: Improves With: albuterol inhaler


Associated Symptoms: constant, edema, wheezing, ankle swelling, No intermittent

, No anxiety, No cough, No chest pain/discomfort, No fever, No insomnia, No 

loss of appetite, No lightheadedness, No weakness, No chills, No hemoptysis, No 

calf pain, No dizziness, No heaviness, No lightheadedness, No leg swelling, No 

muscle spasms feet, No muscle spasms hands, No painful breathing, No productive 

cough, No sweating


International travel in last 2 weeks: No


Allergies/Adverse Reactions: 








No Known Drug Allergies Allergy (Verified 08/18/18 12:57)


 





Home Medications: 








Escitalopram Oxalate 10 mg** [Lexapro 10 MG**] 20 mg PO DAILY 12/17/15 [History]


Gabapentin 300 mg PO BID 12/17/15 [History]


Simethicone 80 mg*** [Mylicon 80MG***] 80 mg PO QID PRN 12/17/15 [History]


Amlodipine Besylate 10 mg [Norvasc 10 MG] 10 mg PO DAILY 07/13/16 [History]


Ferrous Sulfate 325 mg PO BID 07/13/16 [History]


Lisinopril [Zestril] 40 mg PO DAILY 07/13/16 [History]


Budesonide/Formoterol Fumarate [Symbicort 160-4.5 Mcg Inhaler] 6 gm IH BID 01/21 /17 [History]


Hydrochlorothiazide 25 mg*** [hydroDIURIL 25 MG***] 25 mg PO DAILY 07/21/18 [

History]


Metformin HCl 500 mg*** [Glucophage 500 MG***] 500 mg PO BID 07/21/18 [History]


Potassium Chloride 10 Meq Tab* [Klor Con 10 MEQ***] 20 meq PO BID 07/21/18 [

History]


Quetiapine Fumarate 25 mg*** [Seroquel 25 MG***] 25 mg PO HS 07/21/18 [History]


Ranitidine HCl [Zantac] 300 mg PO DAILY 07/21/18 [History]





Hx Tetanus, Diphtheria Vaccination/Date Given: Yes


Hx Influenza Vaccination/Date Given: Yes


Hx Pneumococcal Vaccination/Date Given: Yes


Immunizations Up to Date: Yes





- Review of Systems


Constitutional: No Fever, No Chills


Eyes: No Symptoms


Ears, Nose, & Throat: No Symptoms


Respiratory: Dyspnea, Wheezing


Cardiac: Edema, No Chest Pain


Abdominal/Gastrointestinal: No Abdominal Pain, No Nausea, No Vomiting, No 

Diarrhea


Genitourinary Symptoms: No Dysuria


Musculoskeletal: No Back Pain, No Neck Pain


Skin: No Rash


Neurological: No Dizziness, No Focal Weakness, No Sensory Changes


Psychological: No Symptoms


Endocrine: No Symptoms


All Other Systems: Reviewed and Negative





- Past Medical History


Pertinent Past Medical History: Yes


Neurological History: Seizures


ENT History: No Pertinent History


Cardiac History: Hypertension


Respiratory History: COPD, Pneumonia


Endocrine Medical History: No Pertinent History


Musculoskeletal History: Osteoarthritis


GI Medical History: Gallbladder Disease, Hernia, Other


 History: Renal Disease


Psycho-Social History: Anxiety, Depression


Female Reproductive Disorders: No Pertinent History


Other Medical History: PERF BOWEL 4/2014. pt states she had a seizure because 

she ran out of xanax, pt states she does not remember it, she [facility].  Pt states that her kidneys leak protein.





- Past Surgical History


Past Surgical History: Yes


Neuro Surgical History: No Pertinent History


Cardiac: No Pertinent History


Respiratory: No Pertinent History


Gastrointestinal: Cholecystectomy, Colon Resection


Genitourinary: No Pertinent History


Musculoskeletal: No Pertinent History


Female Surgical History: No Pertinent History





- Social History


Smoking Status: Former smoker


How long have you smoked: 35


Exposure to second hand smoke: No


Drug Use: none


Patient Lives Alone: No





- Female History


Hx Last Menstrual Period: POST





- Nursing Vital Signs


Nursing Vital Signs: 


 Initial Vital Signs











Temperature  98.2 F   08/18/18 12:20


 


Pulse Rate  69   08/18/18 12:20


 


Respiratory Rate  22   08/18/18 12:20


 


Blood Pressure  105/54   08/18/18 12:20


 


O2 Sat by Pulse Oximetry  89 L  08/18/18 12:20








 Pain Scale











Pain Intensity                 0

















- Physical Exam


General Appearance: other (well-developed morbidly obese white female alert 

oriented person place)


Eye Exam: PERRL/EOMI


Ears, Nose, Throat Exam: hearing grossly normal


Neck Exam: normal inspection, supple


Respiratory Exam: diminished breath sounds


Cardiovascular/Chest Exam: normal heart sounds, regular rate/rhythm


Abdominal/Gastrointestinal Exam: soft, normal bowel sounds, distention, No 

tenderness


Extremity Exam: non-tender, pedal edema (2+ pitting edema)


Neurologic Exam: alert, oriented x 3, cooperative, CNs II-XII nml as tested, 

sensation nml, No motor deficits


Skin Exam: normal color, warm, No dry


**SpO2 Interpretation**: borderline oxygenation


SpO2: 91


Oxygen Delivery: Nasal Cannula





- Course


Nursing assessment & vital signs reviewed: Yes


EKG Interpreted by Me: RATE (67 bpm), Sinus Rhythm, Other (EKG: Sinus rhythm, 

67 bpm, normal axis, no acute ST or T wave changes noted.)


Ordered Tests: 


 Active Orders 24 hr











 Category Date Time Status


 


 Cardiac Monitor STAT Care  08/18/18 12:25 Active


 


 Catheter-[place] STAT Care  08/18/18 12:24 Active


 


 EKG-ER Only STAT Care  08/18/18 12:24 Active


 


 IV Insertion STAT Care  08/18/18 12:24 Active


 


 Oxygen-ED Only NASAL CANNULA 6 lpm Care  08/18/18 12:24 Active


 


 Pulse Oximetry (ED) STAT Care  08/18/18 12:24 Active


 


 CHEST 1 VIEW (PORTABLE) Stat Exams  08/18/18 12:25 Taken


 


 ARTERIAL BLOOD GASES Stat Lab  08/18/18 12:24 Completed


 


 ARTERIAL BLOOD GASES Stat Lab  08/18/18 13:53 Completed


 


 BLOOD CULTURE Stat Lab  08/18/18 12:40 Received


 


 CBC W DIFF Stat Lab  08/18/18 12:40 Completed


 


 CMP Stat Lab  08/18/18 12:40 Completed


 


 D-DIMER QUANTITATION Stat Lab  08/18/18 12:40 Completed


 


 Lactic Acid Stat Lab  08/18/18 12:24 Completed


 


 Manual Differential NC Stat Lab  08/18/18 12:40 Completed


 


 NT PRO BNP Stat Lab  08/18/18 12:40 Completed


 


 PROTIME WITH INR Stat Lab  08/18/18 12:40 Completed


 


 PTT Stat Lab  08/18/18 12:40 Completed


 


 TROPONIN Q3H Lab  08/18/18 12:40 Completed


 


 TROPONIN Q3H Lab  08/18/18 15:30 Ordered


 


 TROPONIN Q3H Lab  08/18/18 18:30 Ordered


 


 TROPONIN Q3H Lab  08/18/18 21:30 Ordered


 


 TROPONIN Q3H Lab  08/19/18 00:30 Ordered


 


 UA W/RFX UR CULTURE Stat Lab  08/18/18 12:28 Received


 


 BiPap/CPAP STAT RT  08/18/18 13:02 Active


 


 Peak Expiratory Flow Rate ONCE RT  08/18/18 13:01 Active


 


 Respiratory Nebulizer STAT RT  08/18/18 12:39 Completed


 


 Respiratory Therapy Assessment DAILY RT  08/18/18 13:01 Active








Medication Summary











Generic Name Dose Route Start Last Admin





  Trade Name Freq  PRN Reason Stop Dose Admin


 


Ceftriaxone Sodium/Dextrose  1 g in 50 mls @ 100 mls/hr  08/18/18 14:34  08/18/ 18 14:38





  Rocephin 1 Gm-D5w 50 Ml Bag**  IV  08/18/18 15:03  100 mls/hr





  STAT STA   Administration





     





     





     





     














Discontinued Medications














Generic Name Dose Route Start Last Admin





  Trade Name Freq  PRN Reason Stop Dose Admin


 


Albuterol/Ipratropium  3 ml  08/18/18 12:38  08/18/18 12:50





  Duoneb 0.5-3 Mg/3 Ml Neb**  IH  08/18/18 12:39  3 ml





  STAT ONE   Administration





     





     





     





     


 


Albuterol/Ipratropium  Confirm  08/18/18 12:39  





  Duoneb 0.5-3 Mg/3 Ml Neb**  Administered  08/18/18 12:40  





  Dose   





  3 ml   





  IH   





  .STK-MED ONE   





     





     





     





     


 


Furosemide  40 mg  08/18/18 14:07  08/18/18 14:26





  Lasix 40 Mg/4 Ml***  IV  08/18/18 14:08  40 mg





  STAT ONE   Administration





     





     





     





     


 


Furosemide  Confirm  08/18/18 14:16  





  Lasix 40 Mg/4 Ml***  Administered  08/18/18 14:17  





  Dose   





  40 mg   





  .ROUTE   





  .STK-MED ONE   





     





     





     





     


 


Ceftriaxone Sodium/Dextrose  Confirm  08/18/18 14:35  





  Rocephin 1 Gm-D5w 50 Ml Bag**  Administered  08/18/18 14:36  





  Dose   





  1 g in 50 mls @ ud   





  IV   





  .STK-MED ONE   





     





     





     





     











Lab/Rad Data: 


 Laboratory Result Diagrams





 08/18/18 12:40 





 08/18/18 12:40 





 Laboratory Results











  08/18/18 08/18/18 08/18/18 Range/Units





  13:53 12:40 12:40 


 


WBC     (4.0-10.5)  K/mm3


 


RBC     (4.1-5.4)  M/mm3


 


Hgb     (12.0-16.0)  gm/dl


 


Hct     (35-47)  %


 


MCV     ()  fl


 


MCH     (26-32)  pg


 


MCHC     (32-36)  g/dl


 


RDW     (11.5-14.0)  %


 


Plt Count     (150-450)  K/mm3


 


MPV     (6-9.5)  fl


 


Absolute Granulocytes     (1.4-6.9)  


 


Segmented Neutrophils     (36.0-66.0)  %


 


Lymphocytes (Manual)     (24-44)  %


 


Monocytes (Manual)     (0.0-12.0)  %


 


Platelet Estimate     (NORMAL)  


 


RBC Morphology     


 


Polychromasia     


 


Poikilocytosis     


 


Basophilic Stippling     


 


PT    12.4 H  (9.95-12.35)  SECONDS


 


INR    1.07  (0.8-3.0)  


 


APTT    40.0 H  (25.3-37.0)  SECONDS


 


D-Dimer    846 H*  (215-500)  ng/mL


 


Puncture Site  LEFT RADIAL    


 


pCO2  85 H*    (35-45)  mmHg


 


pO2  80    ()  mmHg


 


Base Excess  13.1 H    (-2.0-2.0)  


 


O2 Saturation  93.8 L    ()  g/dF


 


ABG pH  7.30 L    (7.35-7.45)  


 


ABG HCO3  41.8 H*    (22-28)  


 


ABG O2 Sat (Measured)  97.3    ()  %


 


Logan Test  YES    


 


A-a Gradient  170    


 


a/A Ratio  0.32    


 


Hemoglobin  8.8    


 


Carboxyhemoglobin  2.2    (0.0-6.9)  % THgb


 


Methemoglobin  1.4    (1.4-1.5)  %


 


Potassium  5.4 H    (3.5-5.1)  


 


Temperature  37.0    C


 


POC O2 Flow Rate  50    %


 


Vent Mode  BiPAP    


 


Inspiratory BiPAP  14    


 


Expiratory BiPAP  6    


 


Sodium     (137-145)  mmol/L


 


Chloride     ()  mmol/L


 


Carbon Dioxide     (22-30)  mmol/L


 


Anion Gap     (5-15)  MEQ/L


 


BUN     (7-17)  mg/dL


 


Creatinine     (0.52-1.04)  mg/dL


 


Estimated GFR     ML/MIN


 


Glucose     ()  mg/dL


 


Lactic Acid     (0.4-2.0)  


 


Calcium     (8.4-10.2)  mg/dL


 


Total Bilirubin     (0.2-1.3)  mg/dL


 


AST     (14-36)  U/L


 


ALT     (0-35)  U/L


 


Alkaline Phosphatase     ()  U/L


 


Troponin I   < 0.012   (0.000-0.034)  ng/mL


 


NT-Pro-B Natriuret Pep     (0-900)  pg/mL


 


Serum Total Protein     (6.3-8.2)  g/dL


 


Albumin     (3.5-5.0)  g/dL














  08/18/18 08/18/18 08/18/18 Range/Units





  12:40 12:40 12:24 


 


WBC   9.6   (4.0-10.5)  K/mm3


 


RBC   2.84 L   (4.1-5.4)  M/mm3


 


Hgb   8.5 L   (12.0-16.0)  gm/dl


 


Hct   28.6 L   (35-47)  %


 


MCV   100.7 H   ()  fl


 


MCH   29.9   (26-32)  pg


 


MCHC   29.7 L   (32-36)  g/dl


 


RDW   14.7 H   (11.5-14.0)  %


 


Plt Count   544 H   (150-450)  K/mm3


 


MPV   8.5   (6-9.5)  fl


 


Absolute Granulocytes   8.04 H   (1.4-6.9)  


 


Segmented Neutrophils   81 H   (36.0-66.0)  %


 


Lymphocytes (Manual)   15 L   (24-44)  %


 


Monocytes (Manual)   4   (0.0-12.0)  %


 


Platelet Estimate   INCREASED   (NORMAL)  


 


RBC Morphology   ABNORMAL   


 


Polychromasia   1+   


 


Poikilocytosis   1+   


 


Basophilic Stippling   1+   


 


PT     (9.95-12.35)  SECONDS


 


INR     (0.8-3.0)  


 


APTT     (25.3-37.0)  SECONDS


 


D-Dimer     (215-500)  ng/mL


 


Puncture Site    RIGHT RADIAL  


 


pCO2    81 H*  (35-45)  mmHg


 


pO2    53 L  ()  mmHg


 


Base Excess    13.5 H  (-2.0-2.0)  


 


O2 Saturation    87.3 L  ()  g/dF


 


ABG pH    7.32 L  (7.35-7.45)  


 


ABG HCO3    41.7 H*  (22-28)  


 


ABG O2 Sat (Measured)    90.0 L  ()  %


 


Logan Test    YES  


 


A-a Gradient    159  


 


a/A Ratio    0.25  


 


Hemoglobin    8.4  


 


Carboxyhemoglobin    2.8  (0.0-6.9)  % THgb


 


Methemoglobin    0.2 L  (1.4-1.5)  %


 


Potassium  5.1   5.0  (3.5-5.1)  


 


Temperature    37.0  C


 


POC O2 Flow Rate    44  %


 


Vent Mode     


 


Inspiratory BiPAP     


 


Expiratory BiPAP     


 


Sodium  136 L    (137-145)  mmol/L


 


Chloride  89 L    ()  mmol/L


 


Carbon Dioxide  40 H    (22-30)  mmol/L


 


Anion Gap  11.7    (5-15)  MEQ/L


 


BUN  15    (7-17)  mg/dL


 


Creatinine  0.76    (0.52-1.04)  mg/dL


 


Estimated GFR  > 60.0    ML/MIN


 


Glucose  127 H    ()  mg/dL


 


Lactic Acid    0.4  (0.4-2.0)  


 


Calcium  8.7    (8.4-10.2)  mg/dL


 


Total Bilirubin  0.30    (0.2-1.3)  mg/dL


 


AST  9 L    (14-36)  U/L


 


ALT  11    (0-35)  U/L


 


Alkaline Phosphatase  92    ()  U/L


 


Troponin I     (0.000-0.034)  ng/mL


 


NT-Pro-B Natriuret Pep  2220 H    (0-900)  pg/mL


 


Serum Total Protein  6.3    (6.3-8.2)  g/dL


 


Albumin  3.6    (3.5-5.0)  g/dL














- Progress


Progress: improved


Air Movement: fair


Progress Note: 





08/18/18 12:47


58-year-old white female with history of COPD high blood pressure, pneumonia, 

osteoarthritis, renal disease.


Brought by medics from [facility].


Patient apparently had been transferred there today after being at [facility] for the past 5 days.





Patient's daughter states that she noticed that the patient had continuing 

edema of her lower legs in the patient was short of breath.





On arrival patient is alert she has diminished breath sounds she has 2+ pitting 

edema.





She denies any chest pain she does state she is short of breath.








Patient had been given 60 mg of Lasix by the nursing home this morning she had 

also received 125 mg of Solu-Medrol and an albuterol treatment by medics prior 

to arrival.





[place] catheter is placed with a large amount of urine production.





Patient is noted to have ABGs showing pH 7.32 PCO2 81 by mouth to 53 

bicarbonate 41.7    90% sat on 6 L





Patient with EKG that shows normal sinus rhythm 67 bpm normal axis no acute ST 

or T wave changes





Will await patient's chest x-ray chemistry d-dimer troponin.





the patient is recieving a Duoneb treatment.





08/18/18 14:38


Patient improving with BiPAP unfortunately, patient's PCO2 increased 85


Patient's O2 sat is 97.3





Patient is awake asking to have the BiPAP removed of told her she needs to be 

having on at this point in time





Patient's chemistry is remarkable for a BNP of 2220 sodium 136 potassium 5.1 

chloride 89 bicarbonate 40 glucose is 127 BUN and creatinine were 15 and 0.76 

respectively patient's troponin within normal limits d-dimer was elevated at 846


CBC White blood cell 9.6 hemoglobin 8.5 hematocrit 28.6 platelets 544





Patient is given Lasix 40 mg IV and Rocephin 1 g IV.





I've discussed the patient's case with Dr. Sigala at [facility].


Patient will be transferred to [facility].





She'll be continued on BiPAP, kept on telemetry.


Albuterol treatments 0.5 ml/ 3 mils normal saline if needed.





- Departure


Time of Disposition: 14:36


Departure Disposition: Transfer ([facility] Dr Sigala)


Clinical Impression: 


 hypercapnea, Shortness of breath





CHF (congestive heart failure)


Qualifiers:


 Heart failure type: unspecified Heart failure chronicity: acute on chronic 

Qualified Code(s): I50.9 - Heart failure, unspecified





COPD (chronic obstructive pulmonary disease)


Qualifiers:


 COPD type: COPD with acute exacerbation Qualified Code(s): J44.1 - Chronic 

obstructive pulmonary disease with (acute) exacerbation





Condition: Fair


Critical Care Time: Yes


Critical Care Time(excluding separately billable procedures): 30-74 minutes


Referrals: 


DOMINICK WONG [Primary Care Provider] - 


Instructions:  Heart Failure, Chronic Obstructive Pulmonary Disease

## 2018-11-28 ENCOUNTER — HOSPITAL ENCOUNTER (EMERGENCY)
Dept: HOSPITAL 33 - ED | Age: 58
Discharge: HOME | End: 2018-11-28
Payer: MEDICAID

## 2018-11-28 VITALS — DIASTOLIC BLOOD PRESSURE: 66 MMHG | HEART RATE: 67 BPM | SYSTOLIC BLOOD PRESSURE: 129 MMHG

## 2018-11-28 VITALS — OXYGEN SATURATION: 100 %

## 2018-11-28 DIAGNOSIS — R61: ICD-10-CM

## 2018-11-28 DIAGNOSIS — D72.829: ICD-10-CM

## 2018-11-28 DIAGNOSIS — I10: ICD-10-CM

## 2018-11-28 DIAGNOSIS — Z79.84: ICD-10-CM

## 2018-11-28 DIAGNOSIS — R91.8: Primary | ICD-10-CM

## 2018-11-28 DIAGNOSIS — E11.9: ICD-10-CM

## 2018-11-28 DIAGNOSIS — Z79.899: ICD-10-CM

## 2018-11-28 LAB
ALBUMIN SERPL-MCNC: 4 G/DL (ref 3.5–5)
ALP SERPL-CCNC: 95 U/L (ref 38–126)
ALT SERPL-CCNC: 13 U/L (ref 0–35)
ANION GAP SERPL CALC-SCNC: 15.4 MEQ/L (ref 5–15)
AST SERPL QL: 16 U/L (ref 14–36)
BASOPHILS # BLD AUTO: 0.03 10*3/UL (ref 0–0.4)
BASOPHILS NFR BLD AUTO: 0.2 % (ref 0–0.4)
BILIRUB BLD-MCNC: 0.3 MG/DL (ref 0.2–1.3)
BUN SERPL-MCNC: 24 MG/DL (ref 7–17)
CALCIUM SPEC-MCNC: 8.9 MG/DL (ref 8.4–10.2)
CHLORIDE SERPL-SCNC: 92 MMOL/L (ref 98–107)
CO2 SERPL-SCNC: 37 MMOL/L (ref 22–30)
CREAT SERPL-MCNC: 1 MG/DL (ref 0.52–1.04)
EOSINOPHIL # BLD AUTO: 0.54 10*3/UL (ref 0–0.5)
GLUCOSE SERPL-MCNC: 120 MG/DL (ref 74–106)
GRANULOCYTES # BLD AUTO: 12.1 10*3/UL (ref 1.4–6.9)
HCT VFR BLD AUTO: 32.8 % (ref 35–47)
HGB BLD-MCNC: 10.2 GM/DL (ref 12–16)
LYMPHOCYTES # SPEC AUTO: 1.27 10*3/UL (ref 1–4.6)
MCH RBC QN AUTO: 30.1 PG (ref 26–32)
MCHC RBC AUTO-ENTMCNC: 31.1 G/DL (ref 32–36)
MONOCYTES # BLD AUTO: 0.83 10*3/UL (ref 0–1.3)
NEUTROPHILS NFR BLD AUTO: 81.9 % (ref 36–66)
PLATELET # BLD AUTO: 409 K/MM3 (ref 150–450)
POTASSIUM SERPLBLD-SCNC: 3.8 MMOL/L (ref 3.5–5.1)
PROT SERPL-MCNC: 6.9 G/DL (ref 6.3–8.2)
RBC # BLD AUTO: 3.38 M/MM3 (ref 4.1–5.4)
SODIUM SERPL-SCNC: 141 MMOL/L (ref 137–145)
WBC # BLD AUTO: 14.8 K/MM3 (ref 4–10.5)

## 2018-11-28 PROCEDURE — 99284 EMERGENCY DEPT VISIT MOD MDM: CPT

## 2018-11-28 PROCEDURE — 71045 X-RAY EXAM CHEST 1 VIEW: CPT

## 2018-11-28 PROCEDURE — 36415 COLL VENOUS BLD VENIPUNCTURE: CPT

## 2018-11-28 PROCEDURE — 85379 FIBRIN DEGRADATION QUANT: CPT

## 2018-11-28 PROCEDURE — 93041 RHYTHM ECG TRACING: CPT

## 2018-11-28 PROCEDURE — 96361 HYDRATE IV INFUSION ADD-ON: CPT

## 2018-11-28 PROCEDURE — 36000 PLACE NEEDLE IN VEIN: CPT

## 2018-11-28 PROCEDURE — 93005 ELECTROCARDIOGRAM TRACING: CPT

## 2018-11-28 PROCEDURE — 80053 COMPREHEN METABOLIC PANEL: CPT

## 2018-11-28 PROCEDURE — 85025 COMPLETE CBC W/AUTO DIFF WBC: CPT

## 2018-11-28 PROCEDURE — 96360 HYDRATION IV INFUSION INIT: CPT

## 2018-11-28 PROCEDURE — 84484 ASSAY OF TROPONIN QUANT: CPT

## 2018-11-28 NOTE — XRAY
Exam: AP upright portable chest film from 11/28/2018.



Comparison: AP portable chest film from 8/18/2018.



Indication: Chest pain times several months, shortness of breath, patient is

on oxygen, no history of prior surgery.



Findings: The exam was obtained in a lordotic projection.  The heart size is

mildly enlarged representing no change.  The right paratracheal stripe appears

somewhat prominent, but is unchanged.  No significant abnormality was seen in

this projection on the CT of the chest from 7/21/2018.



No central pulmonary vascular congestion or pulmonary vascular redistribution

to the upper lung field is seen.  No pneumothorax is seen.  The left lung

appears clear.



Within the right lung, there is minimal airspace opacity at the lateral right

lung base which may reflect scarring, chronic infiltrate, atelectasis, or

pleural thickening.  There is also mild blunting of the right costophrenic

angle tip consistent with minimal pleural thickening/fluid.  The right upper

lung field appears clear.  No acute osseous process is seen.  EKG leads are

seen in place.



Impression:



1.  Chronic mild cardiomegaly without evidence of acute heart failure or

pulmonary edema.



2.  Mild asymmetric airspace opacity is seen within the peripheral right lung

base.  This could be due to mild chronic infiltrate, atelectasis, or

scarring/pleural thickening in this projection.  I believe this is mildly

improved as compared 8/18/2018.



3.  The right costophrenic angle tip is also blunted suggesting minimal

pleural fluid or pleural thickening.



4.  The remainder of the right upper lung field and left lung field appears

clear.

## 2018-11-28 NOTE — ERPHSYRPT
- History of Present Illness


Time Seen by Provider: 11/28/18 11:25


Historian: patient


Exam Limitations: no limitations


Physician History: 





57 y/o obese, diabetic white female with h/o htn presents with sharp substernal 

cp. has been intermittent for 2 months. pt has had a cholecystectomy in the 

past. pt was on the commode at home this am and experienced the symptoms. she 

had associated diaphoresis. pt nearly gone now. no soa and no abd pain. pt did 

not take any asa or nitroglycerin. 


Timing/Duration: week(s) (8 weeks. worse this am), intermittent, worse (this am)


Quality: sharpness


Location: substernal


Chest Pain Radiation: back


Severity of Pain-Max: mild


Severity of Pain-Current: mild


Modifying Factors: Improves With: nothing


Associated Symptoms: diaphoresis, No nausea, No vomiting, No palpitations, No 

heartburn, No abdominal pain, No shortness of breath, No cough, No hurts to 

breathe, No chills, No fever, No fatigue, No weakness, No swelling/lump in chest

, No syncope


Aspirin Treatment Today: no aspirin today


Allergies/Adverse Reactions: 








No Known Drug Allergies Allergy (Verified 11/28/18 11:21)


 





Home Medications: 








Escitalopram Oxalate 10 mg** [Lexapro 10 MG**] 20 mg PO DAILY 12/17/15 [History]


Gabapentin 300 mg PO BID 12/17/15 [History]


Simethicone 80 mg*** [Mylicon 80MG***] 80 mg PO QID PRN 12/17/15 [History]


Amlodipine Besylate 10 mg [Norvasc 10 MG] 10 mg PO DAILY 07/13/16 [History]


Ferrous Sulfate 325 mg PO BID 07/13/16 [History]


Lisinopril [Zestril] 40 mg PO DAILY 07/13/16 [History]


Budesonide/Formoterol Fumarate [Symbicort 160-4.5 Mcg Inhaler] 6 gm IH BID 01/21 /17 [History]


Hydrochlorothiazide 25 mg*** [hydroDIURIL 25 MG***] 25 mg PO DAILY 07/21/18 [

History]


Metformin HCl 500 mg*** [Glucophage 500 MG***] 500 mg PO BID 07/21/18 [History]


Potassium Chloride 10 Meq Tab* [Klor Con 10 MEQ***] 20 meq PO BID 07/21/18 [

History]


Quetiapine Fumarate 25 mg*** [Seroquel 25 MG***] 25 mg PO HS 07/21/18 [History]


Ranitidine HCl [Zantac] 300 mg PO DAILY 07/21/18 [History]





Hx Tetanus, Diphtheria Vaccination/Date Given: Yes


Hx Influenza Vaccination/Date Given: Yes


Hx Pneumococcal Vaccination/Date Given: Yes





- Review of Systems


Constitutional: No Symptoms


Eyes: No Symptoms


Ears, Nose, & Throat: No Symptoms


Respiratory: No Symptoms, No Cough, No Dyspnea, No Stridor, No Wheezing


Cardiac: Chest Pain, No Palpitations, No Syncope


Abdominal/Gastrointestinal: No Symptoms, No Abdominal Pain, No Nausea, No 

Vomiting, No Diarrhea


Genitourinary Symptoms: No Symptoms, No Dysuria, No Frequency, No Hematuria


Musculoskeletal: No Symptoms, No Back Pain, No Neck Pain, No Deformity, No 

Injury


Skin: No Symptoms


Neurological: No Symptoms


Psychological: No Symptoms


Endocrine: No Symptoms


Hematologic/Lymphatic: No Symptoms


Immunological/Allergic: No Symptoms


All Other Systems: Reviewed and Negative





- Past Medical History


Pertinent Past Medical History: Yes


Neurological History: Seizures


ENT History: No Pertinent History


Cardiac History: Hypertension


Respiratory History: COPD, Pneumonia


Endocrine Medical History: No Pertinent History


Musculoskeletal History: Osteoarthritis


GI Medical History: Gallbladder Disease, Hernia, Other


 History: Renal Disease


Psycho-Social History: Anxiety, Depression


Female Reproductive Disorders: No Pertinent History


Other Medical History: PERF BOWEL 4/2014. pt states she had a seizure because 

she ran out of xanax, pt states she does not remember it, she was [facility] 

[facility].  Pt states that her kidneys leak protein.





- Past Surgical History


Past Surgical History: Yes


Neuro Surgical History: No Pertinent History


Cardiac: No Pertinent History


Respiratory: No Pertinent History


Gastrointestinal: Cholecystectomy, Colon Resection


Genitourinary: No Pertinent History


Musculoskeletal: No Pertinent History


Female Surgical History: No Pertinent History





- Social History


Smoking Status: Former smoker


How long have you smoked: 35


Exposure to second hand smoke: No


Drug Use: none


Patient Lives Alone: No





- Nursing Vital Signs


Nursing Vital Signs: 


 Initial Vital Signs











Temperature  97.7 F   11/28/18 11:23


 


Pulse Rate  62   11/28/18 11:23


 


Blood Pressure  154/84   11/28/18 11:23


 


O2 Sat by Pulse Oximetry  100   11/28/18 11:23








 Pain Scale











Pain Intensity                 0

















- Physical Exam


General Appearance: no apparent distress, alert, anxiety


Eye Exam: PERRL/EOMI


Ears, Nose, Throat Exam: normal ENT inspection, moist mucous membranes


Neck Exam: normal inspection, non-tender, supple, full range of motion


Respiratory Exam: normal breath sounds, chest tenderness (mild), lungs clear, 

airway intact, No respiratory distress, No accessory muscle use, No rhonchi, No 

wheezing, No stridor


Cardiovascular Exam: regular rate/rhythm, normal heart sounds, normal 

peripheral pulses


Gastrointestinal/Abdomen Exam: soft, normal bowel sounds, rebound, No tenderness

, No guarding


Pelvic Exam: not done


Rectal Exam: not done


Back Exam: normal inspection, normal range of motion, No CVA tenderness, No 

vertebral tenderness


Extremity Exam: normal inspection, normal range of motion, pelvis stable


Neurologic Exam: alert, oriented x 3, cooperative, CNs II-XII nml as tested


Skin Exam: normal color, warm, dry


Lymphatic Exam: No adenopathy


**SpO2 Interpretation**: normal


Oxygen Delivery: Room Air





- Course


Nursing assessment & vital signs reviewed: Yes


EKG Interpreted by Me: RATE ([id number]), Sinus Rhythm, NORMAL AXIS, NORMAL INTERVALS, 

NORMAL QRS, NORMAL ST-T, Other (no change whend compared to ekg dated 8/18/18)


Ordered Tests: 


 Active Orders 24 hr











 Category Date Time Status


 


 Cardiac Monitor STAT Care  11/28/18 11:40 Active


 


 EKG-ER Only STAT Care  11/28/18 11:39 Active


 


 IV Insertion STAT Care  11/28/18 11:39 Active


 


 CHEST 1 VIEW (PORTABLE) Stat Exams  11/28/18 11:40 Completed


 


 CBC W DIFF Stat Lab  11/28/18 11:39 Completed


 


 CMP Stat Lab  11/28/18 11:39 Completed


 


 D-DIMER QUANTITATION Stat Lab  11/28/18 11:39 Completed


 


 TROPONIN Q3H Lab  11/28/18 11:45 Completed


 


 TROPONIN Q3H Lab  11/28/18 14:45 Ordered


 


 TROPONIN Q3H Lab  11/28/18 17:45 Ordered


 


 TROPONIN Q3H Lab  11/28/18 20:45 Ordered


 


 TROPONIN Q3H Lab  11/28/18 23:45 Ordered








Medication Summary











Generic Name Dose Route Start Last Admin





  Trade Name Freq  PRN Reason Stop Dose Admin


 


Sodium Chloride  1,000 mls @ 100 mls/hr  11/28/18 11:45  11/28/18 12:03





  Sodium Chloride 0.9% 1000 Ml  IV  12/28/18 11:44  100 mls/hr





  .Q10H DIMITRI   Administration





     





     





     





     














Discontinued Medications














Generic Name Dose Route Start Last Admin





  Trade Name Kelley  PRN Reason Stop Dose Admin


 


Aspirin  324 mg  11/28/18 11:39  11/28/18 12:04





  Baby Aspirin 81 Mg Chew***  PO  11/28/18 11:40  324 mg





  STAT ONE   Administration





     





     





     





     


 


Aspirin  Confirm  11/28/18 12:01  





  Baby Aspirin 81 Mg Chew***  Administered  11/28/18 12:02  





  Dose   





  324 mg   





  .ROUTE   





  .STK-MED ONE   





     





     





     





     


 


Nitroglycerin  0.4 mg  11/28/18 11:39  11/28/18 12:03





  Nitrostat 0.4 Mg (Ed)***  SL  11/28/18 11:40  Not Given





  STAT ONE   





     





     





     





     


 


Nitroglycerin  Confirm  11/28/18 12:01  





  Nitrostat 0.4 Mg (Ed)***  Administered  11/28/18 12:02  





  Dose   





  0.4 mg   





  SL   





  .STK-MED ONE   





     





     





     





     











Lab/Rad Data: 


 Laboratory Result Diagrams





 11/28/18 11:39 





 11/28/18 11:39 





 Laboratory Results











  11/28/18 11/28/18 11/28/18 Range/Units





  11:45 11:39 11:39 


 


WBC     (4.0-10.5)  K/mm3


 


RBC     (4.1-5.4)  M/mm3


 


Hgb     (12.0-16.0)  gm/dl


 


Hct     (35-47)  %


 


MCV     ()  fl


 


MCH     (26-32)  pg


 


MCHC     (32-36)  g/dl


 


RDW     (11.5-14.0)  %


 


Plt Count     (150-450)  K/mm3


 


MPV     (6-9.5)  fl


 


Gran %     (36.0-66.0)  %


 


Eos # (Auto)     (0-0.5)  


 


Absolute Lymphs (auto)     (1.0-4.6)  


 


Absolute Monos (auto)     (0.0-1.3)  


 


Lymphocytes %     (24.0-44.0)  %


 


Monocytes %     (0.0-12.0)  %


 


Eosinophils %     (0.00-5.0)  %


 


Basophils %     (0.0-0.4)  %


 


Absolute Granulocytes     (1.4-6.9)  


 


Basophils #     (0-0.4)  


 


D-Dimer   391   (215-500)  ng/mL


 


Sodium    141  (137-145)  mmol/L


 


Potassium    3.8  (3.5-5.1)  mmol/L


 


Chloride    92 L  ()  mmol/L


 


Carbon Dioxide    37 H  (22-30)  mmol/L


 


Anion Gap    15.4 H  (5-15)  MEQ/L


 


BUN    24 H  (7-17)  mg/dL


 


Creatinine    1.00  (0.52-1.04)  mg/dL


 


Estimated GFR    > 60.0  ML/MIN


 


Glucose    120 H  ()  mg/dL


 


Calcium    8.9  (8.4-10.2)  mg/dL


 


Total Bilirubin    0.30  (0.2-1.3)  mg/dL


 


AST    16  (14-36)  U/L


 


ALT    13  (0-35)  U/L


 


Alkaline Phosphatase    95  ()  U/L


 


Troponin I  < 0.012    (0.000-0.034)  ng/mL


 


Serum Total Protein    6.9  (6.3-8.2)  g/dL


 


Albumin    4.0  (3.5-5.0)  g/dL














  11/28/18 Range/Units





  11:39 


 


WBC  14.8 H  (4.0-10.5)  K/mm3


 


RBC  3.38 L  (4.1-5.4)  M/mm3


 


Hgb  10.2 L  (12.0-16.0)  gm/dl


 


Hct  32.8 L  (35-47)  %


 


MCV  97.0  ()  fl


 


MCH  30.1  (26-32)  pg


 


MCHC  31.1 L  (32-36)  g/dl


 


RDW  13.9  (11.5-14.0)  %


 


Plt Count  409  (150-450)  K/mm3


 


MPV  9.1  (6-9.5)  fl


 


Gran %  81.9 H  (36.0-66.0)  %


 


Eos # (Auto)  0.54 H  (0-0.5)  


 


Absolute Lymphs (auto)  1.27  (1.0-4.6)  


 


Absolute Monos (auto)  0.83  (0.0-1.3)  


 


Lymphocytes %  8.6 L  (24.0-44.0)  %


 


Monocytes %  5.6  (0.0-12.0)  %


 


Eosinophils %  3.7  (0.00-5.0)  %


 


Basophils %  0.2  (0.0-0.4)  %


 


Absolute Granulocytes  12.10 H  (1.4-6.9)  


 


Basophils #  0.03  (0-0.4)  


 


D-Dimer   (215-500)  ng/mL


 


Sodium   (137-145)  mmol/L


 


Potassium   (3.5-5.1)  mmol/L


 


Chloride   ()  mmol/L


 


Carbon Dioxide   (22-30)  mmol/L


 


Anion Gap   (5-15)  MEQ/L


 


BUN   (7-17)  mg/dL


 


Creatinine   (0.52-1.04)  mg/dL


 


Estimated GFR   ML/MIN


 


Glucose   ()  mg/dL


 


Calcium   (8.4-10.2)  mg/dL


 


Total Bilirubin   (0.2-1.3)  mg/dL


 


AST   (14-36)  U/L


 


ALT   (0-35)  U/L


 


Alkaline Phosphatase   ()  U/L


 


Troponin I   (0.000-0.034)  ng/mL


 


Serum Total Protein   (6.3-8.2)  g/dL


 


Albumin   (3.5-5.0)  g/dL














- Progress


Progress: improved, re-examined


Progress Note: 





11/28/18 14:55


pt states she is feeling  better. no cp


cxr-? right infiltrate


11/28/18 14:57





Blood Culture(s) Obtained: No


Antibiotics given: Yes (rx for home)


Counseled pt/family regarding: lab results, diagnosis, need for follow-up, rad 

results





- Departure


Time of Disposition: 14:57


Departure Disposition: Home


Clinical Impression: 


 Right pulmonary infiltrate on CXR, Leukocytosis





Condition: Stable


Critical Care Time: No


Referrals: 


DOMINICK WONG [Primary Care Provider] - 


Additional Instructions: 


drink plenty of fluids. follow up with primary doctor for further management


Prescriptions: 


Cefdinir 300 mg PO BID 7 Days #14 capsule


Hydrocodone Bit/Acetaminophen [Hydrocodone-Acetaminophen Soln] 10 ml PO Q6H #

120 ml

## 2019-10-15 ENCOUNTER — HOSPITAL ENCOUNTER (OUTPATIENT)
Dept: HOSPITAL 33 - SDC | Age: 59
Discharge: HOME | End: 2019-10-15
Attending: OPHTHALMOLOGY
Payer: COMMERCIAL

## 2019-10-15 VITALS — OXYGEN SATURATION: 98 %

## 2019-10-15 VITALS — DIASTOLIC BLOOD PRESSURE: 82 MMHG | HEART RATE: 72 BPM | SYSTOLIC BLOOD PRESSURE: 148 MMHG

## 2019-10-15 DIAGNOSIS — I10: ICD-10-CM

## 2019-10-15 DIAGNOSIS — J44.9: ICD-10-CM

## 2019-10-15 DIAGNOSIS — H25.812: Primary | ICD-10-CM

## 2019-10-15 DIAGNOSIS — Z79.899: ICD-10-CM

## 2019-10-15 DIAGNOSIS — F41.8: ICD-10-CM

## 2019-10-15 DIAGNOSIS — E11.9: ICD-10-CM

## 2019-10-15 LAB
CHOLESTANOL SERPL-MCNC: 200 MG/DL (ref 50–200)
HDLC SERPL-MCNC: 40 MG/DL (ref 40–60)
LDLC SERPL DIRECT ASSAY-MCNC: 115 MG/DL (ref 30–100)
TRIGL SERPL-MCNC: 212 MG/DL (ref 30–150)

## 2019-10-15 PROCEDURE — 80061 LIPID PANEL: CPT

## 2019-10-15 PROCEDURE — 83036 HEMOGLOBIN GLYCOSYLATED A1C: CPT

## 2019-10-15 PROCEDURE — 36415 COLL VENOUS BLD VENIPUNCTURE: CPT

## 2019-10-15 PROCEDURE — 83721 ASSAY OF BLOOD LIPOPROTEIN: CPT

## 2019-10-15 PROCEDURE — 82962 GLUCOSE BLOOD TEST: CPT

## 2019-10-16 NOTE — OP
DATE/TIME OF OPERATION:  10/15/2019  0929

TIME DICTATED:  1437

PREOPERATIVE DIAGNOSIS:    Senile cataract of left eye.  

POSTOPERATIVE DIAGNOSIS:  Senile cataract of left eye.  

SURGEON:  Destiny Roca MD

ASSISTANT:  None.

 OPERATION:  Cataract extraction of left eye with an intraocular lens implant. 

               STANDARD __X__   COMPLEX ______

ANESTHESIA:  Local.

______  Monitored anesthesia care in combination with topical and intra-cameral anesthesia 
(because of the established specific risk of reflux, arrhythmias, or an anxiety attack 
associated with ocular manipulation as well as difficulty of the ophthalmologist to manage 
such potentially catastrophic events while simultaneously attempting to complete the 
surgical procedure, it was deemed necessary for the patient's safety to have an 
anesthesiologist or a nurse anesthetist present during the procedure whenever possible. 
The anesthesiologist or the nurse anesthetist was utilized to monitor and regulate the 
intravenous sedation of the patient, so the patient was cooperative, relaxed, and 
comfortable).

______ Topical anesthesia using Tetracaine eye drops together with intra cameral 
anesthesia using Lidocaine 1% MPF. The nurse was utilized to monitor the patient. 

ANESTHESIA PROVIDER:  Jim Daly CRNA.

COMPLICATIONS:  None.

BLOOD LOSS:  None.

INDICATIONS:  The patient is undergoing cataract surgery in the hopes of eliminating the 
visual complaints and difficulty. 

PROCEDURE:  After arriving at the facility's outpatient surgery area, an IV was started; 
the patient was given 5 mg of p.o. Versed. (If an anesthesia provider was not monitoring 
the patient) The patient was then given topical anesthetic Tetracaine eye drops. A cotton 
pellet was soaked into a solution of a combination of Zymaxid 0.5%, Deion-Synephrine 2.5% 
and Ocufen (other drops might have been substituted referenced in the patient's record). 
The pellet was inserted by the RN into the lower conjunctival cul-de-sac with a sterile 
forceps and left for 20 minutes. The pellet was then removed by the RN with a sterile 
forceps before taking the patient to the operating room. The preoperative area nurse 
identified the patient and marked the correct eye to be operated on.

 I identified the correct eye to be operated on and marked it appropriately in the 
outpatient surgery area.

The patient was then taken into the operating room. Tetracaine eye drops were installed 
again in the correct eye. The eyelids and the lashes and the lid margins were scrubbed 
with Betadine solution. One drop of the diluted Betadine solution was placed in the 
conjunctival cul-de-sac for 45 seconds and then was irrigated. A drop of Tetracaine Gel 
was placed in the conjunctival cul-de-sac. The patient's forehead was taped to secure it 
during the procedure. The patient was monitored.  

The patient was then draped in the usual way for this procedure. An eye speculum was used 
to separate the eyelids. The eye was then fixated and a temporal 2.5 mm incision was made 
in the clear cornea temporally at the limbus. Through the incision, 0.25 cc of 1% 
non-preserved lidocaine was injected into the anterior chamber for intracameral 
anesthesia. The anterior chamber was then filled with viscoelastic. 

_____ The pupil was small. I felt that it would be safer to mechanically dilate the pupil. 
 A Malyugin ring was used at this point which dilated the pupil. That was removed at the 
end of the procedure prior to aspiration of the viscoelastic from the anterior chamber and 
posterior to the intraocular lens implant.

_____ The cataract had a great amount of cortical changes. That rendered seeing the 
anterior capsule difficult for a safe performance of an anterior capsulotomy. I injected 
an air bubble into the anterior chamber. I then injected 1 ML of vision blue solution into 
the anterior chamber. The vision blue solution was irrigated from the anterior chamber 
after 30 seconds. The anterior capsule was stained which facilitated performing the 
anterior capsulotomy safely. 

 After that was completed, a cystotome was introduced into the anterior chamber and a 
round anterior capsulotomy was performed. The capsule was removed by a forceps.

Hydrodissection was next carried utilizing a 25-gauge cannula and balanced salt solution 
to delineate the cortical material from the capsule and the nucleus from the cortical 
material. The nucleus was rotated freely into the capsular bag with no difficulty.

The phaco tip of the [employer] Phacoemulsifier was introduced into the anterior 
chamber and two grooves were made into the nucleus 90 degrees apart. Using two spatulas 
resulted into the nucleus being fractured into four quadrants. The phaco tip was then used 
to remove each quadrant of the nucleus.

Viscoelastic was used during this process to protect the corneal endothelium. 

Once the entire nucleus was removed, the phaco tip then was removed and the irrigation tip 
was introduced into the eye and the cortex was removed. The posterior capsule was 
polished. 

______ It was noticed that there was a tear into the posterior capsule with few vitreous 
strands into the pupil plan. An anterior vitrectomy was performed.

A 21.50 diopter, [id number], posterior chamber lens implant, was inspected and found to be 
grossly normal. The implant was inserted into the implant injector cartridge; Viscoelastic 
again was introduced into the anterior chamber, which filled the capsular bag.  The 
implant injector's cartridge tip was placed at the limbal wound and the posterior chamber 
implant was released into the capsular bag and rotated appropriately. The implant was 
found to be into the capsular bag and it was centered.

_____  0.2 ml of Tri-Moxi was introduced via 27 gauge cannula into the vitreous cavity 
through the ciliary processes.  

Viscoelastic was aspirated from the anterior chamber and posterior to the intraocular lens 
implant from the capsular bag using the irrigating tip.  

The anterior chamber was irrigated and filled with 5 cc antibiotic solution (500 cc of BSS 
plus 2 ml of Fortaz 100 mg/ml) ( if patient was not allergic to the medication). The lips 
of the corneal incision were hydrated using BSS solution. The anterior chamber was checked 
and found to be water tight. 

__X__ One drop each of antibiotic, steroid and NSAID drops (refer to chart for drops used) 
were placed in the conjunctival cul-de-sac of the operated eye.

Patient tolerated the procedure quite well and left the operating room in satisfactory 
condition.  

DISCHARGE SUMMARY:  The patient was released in stable condition. The patient and those 
with the patient were given an instruction sheet as of how to care for the eye after 
surgery as well as counseling on any abnormal laboratory studies by the postoperative RN.

The patient was also given an appointment card for follow-up in the office and is to call 
immediately for any difficulties including but not limited to pain in the eye, decreased 
vision, discharge from the eye, headache and or fever. 

DISCHARGE DIAGNOSIS: Pseudophakia of left eye.

## 2019-11-19 ENCOUNTER — HOSPITAL ENCOUNTER (OUTPATIENT)
Dept: HOSPITAL 33 - SDC | Age: 59
Discharge: HOME | End: 2019-11-19
Attending: OPHTHALMOLOGY
Payer: COMMERCIAL

## 2019-11-19 VITALS — HEART RATE: 72 BPM | OXYGEN SATURATION: 95 % | DIASTOLIC BLOOD PRESSURE: 74 MMHG | SYSTOLIC BLOOD PRESSURE: 147 MMHG

## 2019-11-19 DIAGNOSIS — Z79.899: ICD-10-CM

## 2019-11-19 DIAGNOSIS — J44.9: ICD-10-CM

## 2019-11-19 DIAGNOSIS — K21.9: ICD-10-CM

## 2019-11-19 DIAGNOSIS — F41.8: ICD-10-CM

## 2019-11-19 DIAGNOSIS — I10: ICD-10-CM

## 2019-11-19 DIAGNOSIS — E11.9: ICD-10-CM

## 2019-11-19 DIAGNOSIS — H25.811: Primary | ICD-10-CM

## 2019-11-19 LAB
AST SERPL QL: 20 U/L (ref 14–36)
CHOLESTANOL SERPL-MCNC: 151 MG/DL (ref 50–200)
HDLC SERPL-MCNC: 45 MG/DL (ref 40–60)
LDLC SERPL DIRECT ASSAY-MCNC: 78 MG/DL (ref 30–100)
TRIGL SERPL-MCNC: 157 MG/DL (ref 30–150)

## 2019-11-19 PROCEDURE — 83721 ASSAY OF BLOOD LIPOPROTEIN: CPT

## 2019-11-19 PROCEDURE — 36415 COLL VENOUS BLD VENIPUNCTURE: CPT

## 2019-11-19 PROCEDURE — 82962 GLUCOSE BLOOD TEST: CPT

## 2019-11-19 PROCEDURE — 84450 TRANSFERASE (AST) (SGOT): CPT

## 2019-11-19 PROCEDURE — 80061 LIPID PANEL: CPT

## 2019-11-20 NOTE — OP
DATE/TIME OF OPERATION:  11/19/2019  0917

TIME DICTATED:  1302

PREOPERATIVE DIAGNOSIS:    Senile cataract of right eye.  

POSTOPERATIVE DIAGNOSIS:  Senile cataract of right eye.  

SURGEON:  Destiny Roca MD

ASSISTANT:  None.

 OPERATION:  Cataract extraction of right eye with an intraocular lens implant. 

               STANDARD __X__   COMPLEX ______

ANESTHESIA:  MAC.

___X__  Monitored anesthesia care in combination with topical and intra-cameral anesthesia 
(because of the established specific risk of reflux, arrhythmias, or an anxiety attack 
associated with ocular manipulation as well as difficulty of the ophthalmologist to manage 
such potentially catastrophic events while simultaneously attempting to complete the 
surgical procedure, it was deemed necessary for the patient's safety to have an 
anesthesiologist or a nurse anesthetist present during the procedure whenever possible. 
The anesthesiologist or the nurse anesthetist was utilized to monitor and regulate the 
intravenous sedation of the patient, so the patient was cooperative, relaxed, and 
comfortable).

______ Topical anesthesia using Tetracaine eye drops together with intra cameral 
anesthesia using Lidocaine 1% MPF. The nurse was utilized to monitor the patient. 

ANESTHESIA PROVIDER:  Jono Pro CRNA.

COMPLICATIONS:  None.

BLOOD LOSS:  None.

INDICATIONS:  The patient is undergoing cataract surgery in the hopes of eliminating the 
visual complaints and difficulty. 

PROCEDURE:  After arriving at the facility's outpatient surgery area, an IV was started; 
the patient was given 5 mg of p.o. Versed. (If an anesthesia provider was not monitoring 
the patient) The patient was then given topical anesthetic Tetracaine eye drops. A cotton 
pellet was soaked into a solution of a combination of Zymaxid 0.5%, Deion-Synephrine 2.5% 
and Ocufen (other drops might have been substituted referenced in the patient's record). 
The pellet was inserted by the RN into the lower conjunctival cul-de-sac with a sterile 
forceps and left for 20 minutes. The pellet was then removed by the RN with a sterile 
forceps before taking the patient to the operating room. The preoperative area nurse 
identified the patient and marked the correct eye to be operated on.

 I identified the correct eye to be operated on and marked it appropriately in the 
outpatient surgery area.

The patient was then taken into the operating room. Tetracaine eye drops were installed 
again in the correct eye. The eyelids and the lashes and the lid margins were scrubbed 
with Betadine solution. One drop of the diluted Betadine solution was placed in the 
conjunctival cul-de-sac for 45 seconds and then was irrigated. A drop of Tetracaine Gel 
was placed in the conjunctival cul-de-sac. The patient's forehead was taped to secure it 
during the procedure. The patient was monitored.  

The patient was then draped in the usual way for this procedure. An eye speculum was used 
to separate the eyelids. The eye was then fixated and a temporal 2.5 mm incision was made 
in the clear cornea temporally at the limbus. Through the incision, 0.25 cc of 1% 
non-preserved lidocaine was injected into the anterior chamber for intracameral 
anesthesia. The anterior chamber was then filled with viscoelastic. 

_____ The pupil was small. I felt that it would be safer to mechanically dilate the pupil. 
 A Malyugin ring was used at this point which dilated the pupil. That was removed at the 
end of the procedure prior to aspiration of the viscoelastic from the anterior chamber and 
posterior to the intraocular lens implant.

_____ The cataract had a great amount of cortical changes. That rendered seeing the 
anterior capsule difficult for a safe performance of an anterior capsulotomy. I injected 
an air bubble into the anterior chamber. I then injected 1 ML of vision blue solution into 
the anterior chamber. The vision blue solution was irrigated from the anterior chamber 
after 30 seconds. The anterior capsule was stained which facilitated performing the 
anterior capsulotomy safely. 

 After that was completed, a cystotome was introduced into the anterior chamber and a 
round anterior capsulotomy was performed. The capsule was removed by a forceps.

Hydrodissection was next carried utilizing a 25-gauge cannula and balanced salt solution 
to delineate the cortical material from the capsule and the nucleus from the cortical 
material. The nucleus was rotated freely into the capsular bag with no difficulty.

The phaco tip of the [facility] [employer] Phacoemulsifier was introduced into the anterior 
chamber and two grooves were made into the nucleus 90 degrees apart. Using two spatulas 
resulted into the nucleus being fractured into four quadrants. The phaco tip was then used 
to remove each quadrant of the nucleus.

Viscoelastic was used during this process to protect the corneal endothelium. 

Once the entire nucleus was removed, the phaco tip then was removed and the irrigation tip 
was introduced into the eye and the cortex was removed. The posterior capsule was 
polished. 

______ It was noticed that there was a tear into the posterior capsule with few vitreous 
strands into the pupil plan. An anterior vitrectomy was performed.

A 21.00 diopter, [id number], posterior chamber lens implant, was inspected and found to be 
grossly normal. The implant was inserted into the implant injector cartridge; Viscoelastic 
again was introduced into the anterior chamber, which filled the capsular bag.  The 
implant injector's cartridge tip was placed at the limbal wound and the posterior chamber 
implant was released into the capsular bag and rotated appropriately. The implant was 
found to be into the capsular bag and it was centered.

_____  0.2 ml of Tri-Moxi was introduced via 27 gauge cannula into the vitreous cavity 
through the ciliary processes.  

Viscoelastic was aspirated from the anterior chamber and posterior to the intraocular lens 
implant from the capsular bag using the irrigating tip.  

The anterior chamber was irrigated and filled with 5 cc antibiotic solution (500 cc of BSS 
plus 2 ml of Fortaz 100 mg/ml) ( if patient was not allergic to the medication). The lips 
of the corneal incision were hydrated using BSS solution. The anterior chamber was checked 
and found to be water tight. 

__X__ One drop each of antibiotic, steroid and NSAID drops (refer to chart for drops used) 
were placed in the conjunctival cul-de-sac of the operated eye.

Patient tolerated the procedure quite well and left the operating room in satisfactory 
condition.  

DISCHARGE SUMMARY:  The patient was released in stable condition. The patient and those 
with the patient were given an instruction sheet as of how to care for the eye after 
surgery as well as counseling on any abnormal laboratory studies by the postoperative RN.

The patient was also given an appointment card for follow-up in the office and is to call 
immediately for any difficulties including but not limited to pain in the eye, decreased 
vision, discharge from the eye, headache and or fever. 

DISCHARGE DIAGNOSIS:  Pseudophakia of right eye.

## 2019-11-23 ENCOUNTER — HOSPITAL ENCOUNTER (EMERGENCY)
Dept: HOSPITAL 33 - ED | Age: 59
LOS: 1 days | Discharge: HOME | End: 2019-11-24
Payer: COMMERCIAL

## 2019-11-23 DIAGNOSIS — I50.812: ICD-10-CM

## 2019-11-23 DIAGNOSIS — Z79.899: ICD-10-CM

## 2019-11-23 DIAGNOSIS — R50.9: Primary | ICD-10-CM

## 2019-11-23 DIAGNOSIS — J44.9: ICD-10-CM

## 2019-11-23 LAB
BASOPHILS # BLD AUTO: 0.02 10*3/UL (ref 0–0.4)
BASOPHILS NFR BLD AUTO: 0.2 % (ref 0–0.4)
EOSINOPHIL # BLD AUTO: 0.19 10*3/UL (ref 0–0.5)
GLUCOSE UR-MCNC: NEGATIVE MG/DL
HCT VFR BLD AUTO: 33 % (ref 35–47)
HGB BLD-MCNC: 10.6 GM/DL (ref 12–16)
LYMPHOCYTES # SPEC AUTO: 0.8 10*3/UL (ref 1–4.6)
MCH RBC QN AUTO: 30.6 PG (ref 26–32)
MCHC RBC AUTO-ENTMCNC: 32.1 G/DL (ref 32–36)
MONOCYTES # BLD AUTO: 0.82 10*3/UL (ref 0–1.3)
PLATELET # BLD AUTO: 283 K/MM3 (ref 150–450)
PROT UR STRIP-MCNC: NEGATIVE MG/DL
RBC # BLD AUTO: 3.46 M/MM3 (ref 4.1–5.4)
RBC #/AREA URNS HPF: (no result) /HPF (ref 0–2)
WBC # BLD AUTO: 10 K/MM3 (ref 4–10.5)
WBC #/AREA URNS HPF: (no result) /HPF (ref 0–5)

## 2019-11-23 PROCEDURE — 94640 AIRWAY INHALATION TREATMENT: CPT

## 2019-11-23 PROCEDURE — 71045 X-RAY EXAM CHEST 1 VIEW: CPT

## 2019-11-23 PROCEDURE — 93041 RHYTHM ECG TRACING: CPT

## 2019-11-23 PROCEDURE — 99284 EMERGENCY DEPT VISIT MOD MDM: CPT

## 2019-11-23 PROCEDURE — 80053 COMPREHEN METABOLIC PANEL: CPT

## 2019-11-23 PROCEDURE — 93005 ELECTROCARDIOGRAM TRACING: CPT

## 2019-11-23 PROCEDURE — 87186 SC STD MICRODIL/AGAR DIL: CPT

## 2019-11-23 PROCEDURE — 83735 ASSAY OF MAGNESIUM: CPT

## 2019-11-23 PROCEDURE — 87070 CULTURE OTHR SPECIMN AEROBIC: CPT

## 2019-11-23 PROCEDURE — 83880 ASSAY OF NATRIURETIC PEPTIDE: CPT

## 2019-11-23 PROCEDURE — 87086 URINE CULTURE/COLONY COUNT: CPT

## 2019-11-23 PROCEDURE — 81001 URINALYSIS AUTO W/SCOPE: CPT

## 2019-11-23 PROCEDURE — 94760 N-INVAS EAR/PLS OXIMETRY 1: CPT

## 2019-11-23 PROCEDURE — 96361 HYDRATE IV INFUSION ADD-ON: CPT

## 2019-11-23 PROCEDURE — 96360 HYDRATION IV INFUSION INIT: CPT

## 2019-11-23 PROCEDURE — 36415 COLL VENOUS BLD VENIPUNCTURE: CPT

## 2019-11-23 PROCEDURE — 84484 ASSAY OF TROPONIN QUANT: CPT

## 2019-11-23 PROCEDURE — 36000 PLACE NEEDLE IN VEIN: CPT

## 2019-11-23 PROCEDURE — 85025 COMPLETE CBC W/AUTO DIFF WBC: CPT

## 2019-11-23 PROCEDURE — 51702 INSERT TEMP BLADDER CATH: CPT

## 2019-11-23 PROCEDURE — 87077 CULTURE AEROBIC IDENTIFY: CPT

## 2019-11-23 NOTE — ERPHSYRPT
- History of Present Illness


Time Seen by Provider: 11/23/19 22:37


Source: patient, family


Exam Limitations: no limitations


Patient Subjective Stated Complaint: pt states she has had a fever and and has 

been short of breath, unknown when it statred. pt's daughter states she was 

well on tuesday. also states pt has been taking too much xanax since last refill


Triage Nursing Assessment: pt awake and alert, answers questions, pt to room 

per wheelchair, transfers to stretcher per self with steady gait noted. skin 

warm and dry. respirations nonlabored, lungs cta. pt restless in bed.


Physician History: 





pt states she has had a fever and and has been short of breath, unknown when it 

started. patient's daughter states she was well on Tuesday. also states pt has 

been taking too much xanax since last refill


Timing/Duration: day(s)


Severity of Dyspnea-Max: mild


Severity of Dyspnea-Current: mild


Associated Symptoms: fever, wheezing


International travel in last 2 weeks: No


Allergies/Adverse Reactions: 








No Known Drug Allergies Allergy (Verified 11/19/19 08:03)


 





Home Medications: 








Escitalopram Oxalate 10 mg** [Lexapro 10 MG**] 20 mg PO DAILY 12/17/15 [History]


Gabapentin 300 mg PO BID 12/17/15 [History]


Ferrous Sulfate 325 mg PO BID 07/13/16 [History]


Budesonide/Formoterol Fumarate [Symbicort 160-4.5 Mcg Inhaler] 6 gm IH BID 01/21 /17 [History]


Potassium Chloride 10 Meq Tab* [Klor Con 10 MEQ***] 20 meq PO BID 07/21/18 [

History]


Alprazolam 1 mg*** [Xanax 1 mg***] 0.5 mg PO TID 10/08/19 [History]


Carvedilol 12.5 mg*** [Coreg 12.5 mg***] 12.5 mg PO BID 10/08/19 [History]


Furosemide [Lasix] 40 mg PO BID 10/08/19 [History]


Hydralazine HCl 10 mg PO BID 10/08/19 [History]


Trazodone HCl 50 mg*** [Desyrel 50 mg***] 150 mg PO HS 10/08/19 [History]


Polyethylene Glycol 3350 17 gm [Miralax Powder 17GM PACKET***] 17 gm PO DAILY 11 /19/19 [History]


Simvastatin 10 mg** [Zocor 10MG**] 10 mg PO QPM 11/19/19 [History]


Famotidine [Pepcid] 40 mg PO DAILY 11/24/19 [History]





Hx Tetanus, Diphtheria Vaccination/Date Given: Yes


Hx Influenza Vaccination/Date Given: No


Hx Pneumococcal Vaccination/Date Given: Yes


Immunizations Up to Date: Yes





- Review of Systems


Constitutional: Fever, No Chills


Eyes: No Symptoms


Ears, Nose, & Throat: No Symptoms


Respiratory: Cough, Dyspnea, Dyspnea on Exertion (EARLY), Wheezing


Cardiac: No Chest Pain, No Edema, No Syncope


Abdominal/Gastrointestinal: No Abdominal Pain, No Nausea, No Vomiting, No 

Diarrhea


Genitourinary Symptoms: No Dysuria


Musculoskeletal: No Back Pain, No Neck Pain


Skin: No Rash


Neurological: No Dizziness, No Focal Weakness, No Sensory Changes


Psychological: No Symptoms


Endocrine: No Symptoms


All Other Systems: Reviewed and Negative





- Past Medical History


Pertinent Past Medical History: Yes


Neurological History: Seizures


ENT History: No Pertinent History


Cardiac History: Hypertension


Respiratory History: COPD, Pneumonia


Endocrine Medical History: No Pertinent History


Musculoskeletal History: Osteoarthritis


GI Medical History: Gallbladder Disease, Hernia, Other


 History: Renal Disease


Psycho-Social History: Anxiety, Depression


Female Reproductive Disorders: No Pertinent History


Other Medical History: PERF BOWEL 4/2014. pt states she had a seizure because 

she ran out of xanax, pt states she does not remember it, she was taken to 

[facility].  Pt states that her kidneys leak protein.





- Past Surgical History


Past Surgical History: Yes


Neuro Surgical History: No Pertinent History


Cardiac: No Pertinent History


Respiratory: No Pertinent History


Gastrointestinal: Cholecystectomy, Colon Resection


Genitourinary: No Pertinent History


Musculoskeletal: No Pertinent History


Female Surgical History: No Pertinent History





- Social History


Smoking Status: Former smoker


How long have you smoked: 35


Exposure to second hand smoke: No


Drug Use: none


Patient Lives Alone: Yes





- Nursing Vital Signs


Nursing Vital Signs: 


 Initial Vital Signs











Temperature  102.5 F   11/23/19 22:21


 


Pulse Rate  97 H  11/23/19 22:21


 


Respiratory Rate  22   11/23/19 22:21


 


Blood Pressure  166/72   11/23/19 22:21


 


O2 Sat by Pulse Oximetry  96   11/23/19 22:21








 Pain Scale











Pain Intensity                 7

















- Physical Exam


General Appearance: no apparent distress, alert


Eye Exam: PERRL/EOMI


Neck Exam: normal inspection, supple


Respiratory Exam: diminished breath sounds, rhonchi, wheezing


Cardiovascular/Chest Exam: normal heart sounds, regular rate/rhythm


Abdominal/Gastrointestinal Exam: soft, No tenderness, No distention, No mass


Extremity Exam: non-tender, normal range of motion, normal inspection, no calf 

tenderness, no pedal edema


Neurologic Exam: alert, oriented x 3, cooperative, CNs II-XII nml as tested, 

sensation nml, No motor deficits


Skin Exam: normal color, warm, No dry


**SpO2 Interpretation**: normal


SpO2: 96





- Course


Nursing assessment & vital signs reviewed: Yes


EKG Interpreted by Me: Sinus Rhythm





- Radiology Exams


  ** Chest


X-ray Interpretation: Reviewed by me (COPD changes)


Ordered Tests: 


 Active Orders 24 hr











 Category Date Time Status


 


 Cardiac Monitor STAT Care  11/23/19 22:39 Active


 


 EKG-ER Only STAT Care  11/23/19 22:32 Active


 


 [place] [Catheter-[place]ter [place]] STAT Care  11/23/19 22:53 Active


 


 IV Insertion STAT Care  11/23/19 22:39 Active


 


 Oxygen-ED Only Nasal Cannula 2 lpm Care  11/23/19 22:32 Active


 


 Pulse Oximetry (ED) STAT Care  11/23/19 22:41 Active


 


 CHEST 1 VIEW (PORTABLE) Stat Exams  11/23/19 22:33 Taken


 


 CBC W DIFF Stat Lab  11/23/19 22:32 Completed


 


 CMP Stat Lab  11/23/19 22:32 Completed


 


 CULTURE,SPUTUM Stat Lab  11/23/19 22:51 Ordered


 


 CULTURE,URINE Stat Lab  11/23/19 23:38 Received


 


 Lactic Acid Stat Lab  11/23/19 22:32 Ordered


 


 MAGNESIUM Stat Lab  11/23/19 22:32 Completed


 


 NT PRO BNP Stat Lab  11/23/19 22:32 Completed


 


 TROPONIN Q3H Lab  11/23/19 22:45 Completed


 


 TROPONIN Q3H Lab  11/24/19 01:45 Ordered


 


 TROPONIN Q3H Lab  11/24/19 04:45 Ordered


 


 TROPONIN Q3H Lab  11/24/19 07:45 Ordered


 


 TROPONIN Q3H Lab  11/24/19 10:45 Ordered


 


 UA W/RFX UR CULTURE Stat Lab  11/23/19 23:38 Completed


 


 Respiratory Therapy Assessment DAILY RT  11/23/19 22:42 Completed








Medication Summary











Generic Name Dose Route Start Last Admin





  Trade Name Freq  PRN Reason Stop Dose Admin


 


Sodium Chloride  1,000 mls @ 50 mls/hr  11/23/19 22:45  11/23/19 22:41





  Sodium Chloride 0.9% 1000 Ml  IV  12/23/19 22:44  50 mls/hr





  .Q20H DIMITRI   Administration





     





     





     





     














Discontinued Medications














Generic Name Dose Route Start Last Admin





  Trade Name Kelley  PRN Reason Stop Dose Admin


 


Albuterol/Ipratropium  3 ml  11/23/19 22:32  11/23/19 22:46





  Duoneb 0.5-3 Mg/3 Ml Neb**  IH  11/23/19 22:33  3 ml





  STAT ONE   Administration





     





     





     





     


 


Albuterol/Ipratropium  Confirm  11/23/19 22:40  





  Duoneb 0.5-3 Mg/3 Ml Neb**  Administered  11/23/19 22:41  





  Dose   





  3 ml   





  IH   





  .STK-MED ONE   





     





     





     





     


 


Ibuprofen  600 mg  11/23/19 23:03  11/23/19 23:05





  Motrin 600 Mg***  PO  11/23/19 23:04  600 mg





  STAT ONE   Administration





     





     





     





     


 


Ibuprofen  Confirm  11/23/19 23:04  





  Motrin 600 Mg***  Administered  11/23/19 23:05  





  Dose   





  600 mg   





  .ROUTE   





  .STK-MED ONE   





     





     





     





     











Lab/Rad Data: 


 Laboratory Result Diagrams





 11/23/19 22:32 





 11/23/19 22:32 





 Laboratory Results











  11/23/19 11/23/19 11/23/19 Range/Units





  23:38 22:45 22:32 


 


WBC     (4.0-10.5)  K/mm3


 


RBC     (4.1-5.4)  M/mm3


 


Hgb     (12.0-16.0)  gm/dl


 


Hct     (35-47)  %


 


MCV     ()  fl


 


MCH     (26-32)  pg


 


MCHC     (32-36)  g/dl


 


RDW     (11.5-14.0)  %


 


Plt Count     (150-450)  K/mm3


 


MPV     (6-9.5)  fl


 


Gran %     (36.0-66.0)  %


 


Eos # (Auto)     (0-0.5)  


 


Absolute Lymphs (auto)     (1.0-4.6)  


 


Absolute Monos (auto)     (0.0-1.3)  


 


Lymphocytes %     (24.0-44.0)  %


 


Monocytes %     (0.0-12.0)  %


 


Eosinophils %     (0.00-5.0)  %


 


Basophils %     (0.0-0.4)  %


 


Absolute Granulocytes     (1.4-6.9)  


 


Basophils #     (0-0.4)  


 


Sodium    137  (137-145)  mmol/L


 


Potassium    3.9  (3.5-5.1)  mmol/L


 


Chloride    94 L  ()  mmol/L


 


Carbon Dioxide    33 H  (22-30)  mmol/L


 


Anion Gap    13.1  (5-15)  MEQ/L


 


BUN    10  (7-17)  mg/dL


 


Creatinine    0.97  (0.52-1.04)  mg/dL


 


Estimated GFR    > 60.0  ML/MIN


 


Glucose    162 H  ()  mg/dL


 


Calcium    8.7  (8.4-10.2)  mg/dL


 


Magnesium    1.6  (1.6-2.3)  mg/dL


 


Total Bilirubin    0.40  (0.2-1.3)  mg/dL


 


AST    26  (14-36)  U/L


 


ALT    29  (0-35)  U/L


 


Alkaline Phosphatase    92  ()  U/L


 


Troponin I   < 0.012   (0.000-0.034)  ng/mL


 


NT-Pro-B Natriuret Pep    1030 H  (0-900)  pg/mL


 


Serum Total Protein    6.9  (6.3-8.2)  g/dL


 


Albumin    3.5  (3.5-5.0)  g/dL


 


Urine Color  YELLOW    (YELLOW)  


 


Urine Appearance  CLEAR    (CLEAR)  


 


Urine pH  5.0    (5-6)  


 


Ur Specific Gravity  1.009    (1.005-1.025)  


 


Urine Protein  NEGATIVE    (Negative)  


 


Urine Ketones  NEGATIVE    (NEGATIVE)  


 


Urine Blood  NEGATIVE    (0-5)  Bentley/ul


 


Urine Nitrite  NEGATIVE    (NEGATIVE)  


 


Urine Bilirubin  NEGATIVE    (NEGATIVE)  


 


Urine Urobilinogen  NEGATIVE    (0-1)  mg/dL


 


Ur Leukocyte Esterase  NEGATIVE    (NEGATIVE)  


 


Urine WBC (Auto)  NONE    (0-5)  /HPF


 


Urine RBC (Auto)  NONE    (0-2)  /HPF


 


U Epithel Cells (Auto)  NONE    (FEW)  /HPF


 


Urine Bacteria (Auto)  NONE    (NEGATIVE)  /HPF


 


Urine Culture Reflexed  ORDERED SEPARATELY    (NO)  


 


Urine Glucose  NEGATIVE    (NEGATIVE)  mg/dL














  11/23/19 Range/Units





  22:32 


 


WBC  10.0  (4.0-10.5)  K/mm3


 


RBC  3.46 L  (4.1-5.4)  M/mm3


 


Hgb  10.6 L  (12.0-16.0)  gm/dl


 


Hct  33.0 L  (35-47)  %


 


MCV  95.4  ()  fl


 


MCH  30.6  (26-32)  pg


 


MCHC  32.1  (32-36)  g/dl


 


RDW  13.8  (11.5-14.0)  %


 


Plt Count  283  (150-450)  K/mm3


 


MPV  9.3  (6-9.5)  fl


 


Gran %  81.7 H  (36.0-66.0)  %


 


Eos # (Auto)  0.19  (0-0.5)  


 


Absolute Lymphs (auto)  0.80 L  (1.0-4.6)  


 


Absolute Monos (auto)  0.82  (0.0-1.3)  


 


Lymphocytes %  8.0 L  (24.0-44.0)  %


 


Monocytes %  8.2  (0.0-12.0)  %


 


Eosinophils %  1.9  (0.00-5.0)  %


 


Basophils %  0.2  (0.0-0.4)  %


 


Absolute Granulocytes  8.21 H  (1.4-6.9)  


 


Basophils #  0.02  (0-0.4)  


 


Sodium   (137-145)  mmol/L


 


Potassium   (3.5-5.1)  mmol/L


 


Chloride   ()  mmol/L


 


Carbon Dioxide   (22-30)  mmol/L


 


Anion Gap   (5-15)  MEQ/L


 


BUN   (7-17)  mg/dL


 


Creatinine   (0.52-1.04)  mg/dL


 


Estimated GFR   ML/MIN


 


Glucose   ()  mg/dL


 


Calcium   (8.4-10.2)  mg/dL


 


Magnesium   (1.6-2.3)  mg/dL


 


Total Bilirubin   (0.2-1.3)  mg/dL


 


AST   (14-36)  U/L


 


ALT   (0-35)  U/L


 


Alkaline Phosphatase   ()  U/L


 


Troponin I   (0.000-0.034)  ng/mL


 


NT-Pro-B Natriuret Pep   (0-900)  pg/mL


 


Serum Total Protein   (6.3-8.2)  g/dL


 


Albumin   (3.5-5.0)  g/dL


 


Urine Color   (YELLOW)  


 


Urine Appearance   (CLEAR)  


 


Urine pH   (5-6)  


 


Ur Specific Gravity   (1.005-1.025)  


 


Urine Protein   (Negative)  


 


Urine Ketones   (NEGATIVE)  


 


Urine Blood   (0-5)  Bentley/ul


 


Urine Nitrite   (NEGATIVE)  


 


Urine Bilirubin   (NEGATIVE)  


 


Urine Urobilinogen   (0-1)  mg/dL


 


Ur Leukocyte Esterase   (NEGATIVE)  


 


Urine WBC (Auto)   (0-5)  /HPF


 


Urine RBC (Auto)   (0-2)  /HPF


 


U Epithel Cells (Auto)   (FEW)  /HPF


 


Urine Bacteria (Auto)   (NEGATIVE)  /HPF


 


Urine Culture Reflexed   (NO)  


 


Urine Glucose   (NEGATIVE)  mg/dL














- Progress


Progress: improved


Air Movement: good


Blood Culture(s) Obtained: No


Antibiotics given: No


Counseled pt/family regarding: drug and/or alcohol abuse, lab results, diagnosis

, need for follow-up, rad results, smoking cessation





- Departure


Departure Disposition: Home


Clinical Impression: 


Fever


Qualifiers:


 Fever type: unspecified Qualified Code(s): R50.9 - Fever, unspecified





CHF (congestive heart failure)


Qualifiers:


 Heart failure type: right-sided Heart failure chronicity: chronic Qualified 

Code(s): I50.812 - Chronic right heart failure





COPD (chronic obstructive pulmonary disease)


Qualifiers:


 COPD type: unspecified COPD Qualified Code(s): J44.9 - Chronic obstructive 

pulmonary disease, unspecified





Condition: Stable


Critical Care Time: No


Referrals: 


MARCELINO BELLA [Primary Care Provider] - 


Instructions:  Heart Failure, Chronic Obstructive Pulmonary Disease, Shortness 

of Breath (Dyspnea) (DC), Exacerbation of COPD (DC)


Additional Instructions: 


Discharge/Care Plan





NELLY LIZ was seen on 11/24/19 in the Emergency Room. The patient was 

counseled regarding Diagnosis,Lab results, Imaging studies, need for follow up 

and when to return to the Emergency Room.





Prescriptions given:





Discharge Note





I have spoken with the patient and/or caregivers. I have explained the patient'

s condition, diagnosis and treatment plan based on the information available to 

me at this time. I have answered the patient's and/or caregiver's questions and 

addressed any concerns. The patient and/or caregivers have as good 

understanding of the patient's diagnosis, condition and treatment plan as can 

be expected at this point. The vital signs have been stable. The patient's 

condition is stable and appropriate for discharge from the emergency department.





The patient will pursue further outpatient evaluation with the primary care 

physician or other designated or consulting physician as outlined in the 

discharge instructions. The patient and/or caregivers are agreeable to this 

plan of care and follow-up instructions have been explained in detail. The 

patient and/or caregivers have received these instruction. The patient/and or 

caregivers are aware that any significant change in condition or worsening of 

symptoms should prompt an immediate return to this or the closest emergency 

department or call 911. 





NELLY ILZ was seen on 11/24/19 n the Emergency Room. At that time you 

were treated for an emergent condition, during your visit Laboratory, Radiology 

and/or other procedures may have been ordered. It is very important that you 

follow-up with your Primary Care Physician MARCELINO BELLA within the next 24-48 

hours to review your Emergency Room visit and the final results of testing that 

was ordered.  Some test results such as Urine Cultures, Blood Cultures, and 

other cultures if ordered will not be finalized for 24-48 hours.





If you do not have a Primary Care Provider please call the medical records 

department at [phone] to obtain a copy of your results or you may 

sign into our patient portal to obtain these results by visiting us @ [email or web address]

[email or web address] and completing the following steps:





1. Click on the Patient Portal link





2. Click the Patient Self Enrollment Link to complete the enrollment form and 

entering your Medical Record Number [id number]





3. Once the enrollment form is completed you will receive an email with a 

temporary ID and password at the email address you provided. 





4. Next choose a user name and password. Your user name must be at least 4 

characters long and your password must be at least 4 characters long.





5. Choose a security question from the list and provide your answer to the 

question.





If you already have signed into the Health Portal you may access your Health 

Care Information  24/7 by the following steps:





1. Login to  our website @ [email or web address]





2. Enter your original user name and password.





FAQS





The [facility] Health Portal is an online tool that contains your Lab Results, 

Radiology Reports, Visit History, Discharge Instructions and Health Summary 





Lab and Radiology Results will not be available for 72 hours on the portal.





The Portal is a secure site, passwords are encryted and URLs are re-written so 

they cannot be copied and pasted. You and authorized family members are the 

only ones who can access your Portal. Also there is a timeout feature that 

protects your information if you leave the Portal page open.





If you have technical difficulty please use the Contact Us link on the page 

this will allow you to submit any questions you have regarding the Portal or 

you may contact the Medical Record Department at [phone] ext [phone].

## 2019-11-24 VITALS — SYSTOLIC BLOOD PRESSURE: 129 MMHG | DIASTOLIC BLOOD PRESSURE: 50 MMHG | HEART RATE: 87 BPM | OXYGEN SATURATION: 96 %

## 2019-11-24 LAB
ALBUMIN SERPL-MCNC: 3.5 G/DL (ref 3.5–5)
ALP SERPL-CCNC: 92 U/L (ref 38–126)
ALT SERPL-CCNC: 29 U/L (ref 0–35)
ANION GAP SERPL CALC-SCNC: 13.1 MEQ/L (ref 5–15)
AST SERPL QL: 26 U/L (ref 14–36)
BILIRUB BLD-MCNC: 0.4 MG/DL (ref 0.2–1.3)
BNP SERPL-MCNC: 1030 PG/ML (ref 0–900)
BUN SERPL-MCNC: 10 MG/DL (ref 7–17)
CALCIUM SPEC-MCNC: 8.7 MG/DL (ref 8.4–10.2)
CHLORIDE SERPL-SCNC: 94 MMOL/L (ref 98–107)
CO2 SERPL-SCNC: 33 MMOL/L (ref 22–30)
CREAT SERPL-MCNC: 0.97 MG/DL (ref 0.52–1.04)
GLUCOSE SERPL-MCNC: 162 MG/DL (ref 74–106)
MAGNESIUM SERPL-MCNC: 1.6 MG/DL (ref 1.6–2.3)
POTASSIUM SERPLBLD-SCNC: 3.9 MMOL/L (ref 3.5–5.1)
PROT SERPL-MCNC: 6.9 G/DL (ref 6.3–8.2)
SODIUM SERPL-SCNC: 137 MMOL/L (ref 137–145)

## 2019-11-24 NOTE — XRAY
Indication: Short of breath.



Comparison: November 28, 2018.



Portable apical lordotic chest less inflated accentuating cardiopulmonary

structures.  Stable right costophrenic angle blunting and cardiomegaly.  No

new cardiopulmonary abnormalities.  Bony thorax intact.

## 2019-12-25 ENCOUNTER — HOSPITAL ENCOUNTER (INPATIENT)
Dept: HOSPITAL 33 - ED | Age: 59
LOS: 4 days | Discharge: HOME | DRG: 390 | End: 2019-12-29
Attending: FAMILY MEDICINE | Admitting: FAMILY MEDICINE
Payer: COMMERCIAL

## 2019-12-25 DIAGNOSIS — Z99.81: ICD-10-CM

## 2019-12-25 DIAGNOSIS — R09.02: ICD-10-CM

## 2019-12-25 DIAGNOSIS — K56.600: Primary | ICD-10-CM

## 2019-12-25 DIAGNOSIS — I50.9: ICD-10-CM

## 2019-12-25 DIAGNOSIS — Z79.899: ICD-10-CM

## 2019-12-25 DIAGNOSIS — J44.9: ICD-10-CM

## 2019-12-25 DIAGNOSIS — K43.2: ICD-10-CM

## 2019-12-25 LAB
ALBUMIN SERPL-MCNC: 4.3 G/DL (ref 3.5–5)
ALP SERPL-CCNC: 91 U/L (ref 38–126)
ALT SERPL-CCNC: 19 U/L (ref 0–35)
ANION GAP SERPL CALC-SCNC: 11.4 MEQ/L (ref 5–15)
AST SERPL QL: 18 U/L (ref 14–36)
BASOPHILS # BLD AUTO: 0.02 10*3/UL (ref 0–0.4)
BASOPHILS NFR BLD AUTO: 0.2 % (ref 0–0.4)
BILIRUB BLD-MCNC: 0.3 MG/DL (ref 0.2–1.3)
BUN SERPL-MCNC: 13 MG/DL (ref 7–17)
CALCIUM SPEC-MCNC: 9.5 MG/DL (ref 8.4–10.2)
CHLORIDE SERPL-SCNC: 94 MMOL/L (ref 98–107)
CO2 SERPL-SCNC: 39 MMOL/L (ref 22–30)
CREAT SERPL-MCNC: 1.03 MG/DL (ref 0.52–1.04)
EOSINOPHIL # BLD AUTO: 0.72 10*3/UL (ref 0–0.5)
GLUCOSE SERPL-MCNC: 99 MG/DL (ref 74–106)
HCT VFR BLD AUTO: 36.1 % (ref 35–47)
HGB BLD-MCNC: 11.2 GM/DL (ref 12–16)
LIPASE SERPL-CCNC: 60 U/L (ref 23–300)
LYMPHOCYTES # SPEC AUTO: 1.89 10*3/UL (ref 1–4.6)
MCH RBC QN AUTO: 30.5 PG (ref 26–32)
MCHC RBC AUTO-ENTMCNC: 31 G/DL (ref 32–36)
MONOCYTES # BLD AUTO: 0.79 10*3/UL (ref 0–1.3)
PLATELET # BLD AUTO: 278 K/MM3 (ref 150–450)
POTASSIUM SERPLBLD-SCNC: 3.8 MMOL/L (ref 3.5–5.1)
PROT SERPL-MCNC: 7.8 G/DL (ref 6.3–8.2)
RBC # BLD AUTO: 3.67 M/MM3 (ref 4.1–5.4)
SODIUM SERPL-SCNC: 141 MMOL/L (ref 137–145)
WBC # BLD AUTO: 12.9 K/MM3 (ref 4–10.5)

## 2019-12-25 PROCEDURE — 85025 COMPLETE CBC W/AUTO DIFF WBC: CPT

## 2019-12-25 PROCEDURE — 96374 THER/PROPH/DIAG INJ IV PUSH: CPT

## 2019-12-25 PROCEDURE — 83690 ASSAY OF LIPASE: CPT

## 2019-12-25 PROCEDURE — 87209 SMEAR COMPLEX STAIN: CPT

## 2019-12-25 PROCEDURE — 96360 HYDRATION IV INFUSION INIT: CPT

## 2019-12-25 PROCEDURE — 36415 COLL VENOUS BLD VENIPUNCTURE: CPT

## 2019-12-25 PROCEDURE — 80053 COMPREHEN METABOLIC PANEL: CPT

## 2019-12-25 PROCEDURE — 36000 PLACE NEEDLE IN VEIN: CPT

## 2019-12-25 PROCEDURE — 96375 TX/PRO/DX INJ NEW DRUG ADDON: CPT

## 2019-12-25 PROCEDURE — 87177 OVA AND PARASITES SMEARS: CPT

## 2019-12-25 PROCEDURE — 99285 EMERGENCY DEPT VISIT HI MDM: CPT

## 2019-12-25 PROCEDURE — 94760 N-INVAS EAR/PLS OXIMETRY 1: CPT

## 2019-12-25 PROCEDURE — 94640 AIRWAY INHALATION TREATMENT: CPT

## 2019-12-25 PROCEDURE — 74177 CT ABD & PELVIS W/CONTRAST: CPT

## 2019-12-25 NOTE — ERPHSYRPT
- History of Present Illness


Time Seen by Provider: 12/25/19 17:00


Historian: patient


Exam Limitations: no limitations


Physician History: 





abdominal pain and nausea vomiting started today.  Patient has a large ventral 

hernia since her cholecystectomy in 2014.  Patient states that normally when 

she lies down the hernia goes back down but today certainly that down.  Patient 

had a bowel movement today.


Timing/Duration: today


Activities at Onset: none


Quality: sharpness, stabbing


Abdominal Pain Onset Location: generalized abdomen


Pain Radiation: no radiation


Severity of Pain-Max: severe


Severity of Pain-Current: severe


Modifying Factors: Improves With: nothing


Associated Symptoms: nausea, vomiting, No chest pain, No diaphoresis, No 

diarrhea, No fever/chills, No fatigue, No headache, No heartburn, No loss of 

appetite, No neck pain, No rash, No shortness of breath, No syncope


Previous symptoms: different symptoms


Allergies/Adverse Reactions: 








No Known Drug Allergies Allergy (Verified 12/25/19 16:58)


 





Home Medications: 








Escitalopram Oxalate 10 mg** [Lexapro 10 MG**] 20 mg PO DAILY 12/17/15 [History]


Gabapentin 300 mg PO BID 12/17/15 [History]


Ferrous Sulfate 325 mg PO BID 07/13/16 [History]


Budesonide/Formoterol Fumarate [Symbicort 160-4.5 Mcg Inhaler] 6 gm IH BID 01/21 /17 [History]


Potassium Chloride 10 Meq Tab* [Klor Con 10 MEQ***] 20 meq PO BID 07/21/18 [

History]


Alprazolam 1 mg*** [Xanax 1 mg***] 0.5 mg PO TID 10/08/19 [History]


Carvedilol 12.5 mg*** [Coreg 12.5 mg***] 12.5 mg PO BID 10/08/19 [History]


Furosemide [Lasix] 40 mg PO BID 10/08/19 [History]


Hydralazine HCl 10 mg PO BID 10/08/19 [History]


Trazodone HCl 50 mg*** [Desyrel 50 mg***] 150 mg PO HS 10/08/19 [History]


Polyethylene Glycol 3350 17 gm [Miralax Powder 17GM PACKET***] 17 gm PO DAILY 11 /19/19 [History]


Simvastatin 10 mg** [Zocor 10MG**] 10 mg PO QPM 11/19/19 [History]


Famotidine [Pepcid] 40 mg PO DAILY 11/24/19 [History]





Hx Tetanus, Diphtheria Vaccination/Date Given: Yes


Hx Influenza Vaccination/Date Given: No


Hx Pneumococcal Vaccination/Date Given: Yes





- Review of Systems


Constitutional: No Fever, No Chills


Eyes: No Symptoms


Ears, Nose, & Throat: No Symptoms


Respiratory: No Cough, No Dyspnea


Cardiac: No Chest Pain, No Edema, No Syncope


Abdominal/Gastrointestinal: Abdominal Pain, Nausea, Vomiting, Constipation, No 

Diarrhea


Genitourinary Symptoms: No Dysuria


Musculoskeletal: No Back Pain, No Neck Pain


Skin: No Rash


Neurological: No Dizziness, No Focal Weakness, No Sensory Changes


Psychological: No Symptoms


Endocrine: No Symptoms


All Other Systems: Reviewed and Negative





- Past Medical History


Pertinent Past Medical History: Yes


Neurological History: Seizures


ENT History: No Pertinent History


Cardiac History: Hypertension


Respiratory History: COPD, Pneumonia


Endocrine Medical History: No Pertinent History


Musculoskeletal History: Osteoarthritis


GI Medical History: Gallbladder Disease, Hernia, Other


 History: Renal Disease


Psycho-Social History: Anxiety, Depression


Female Reproductive Disorders: No Pertinent History


Other Medical History: PERF BOWEL 4/2014. pt states she had a seizure because 

she ran out of xanax, pt states she does not remember it, she was [facility] 

[facility].  Pt states that her kidneys leak protein.





- Past Surgical History


Past Surgical History: Yes


Neuro Surgical History: No Pertinent History


Cardiac: No Pertinent History


Respiratory: No Pertinent History


Gastrointestinal: Cholecystectomy, Colon Resection


Genitourinary: No Pertinent History


Musculoskeletal: No Pertinent History


Female Surgical History: No Pertinent History





- Social History


Smoking Status: Former smoker


How long have you smoked: 35


Exposure to second hand smoke: No


Drug Use: none


Patient Lives Alone: Yes





- Nursing Vital Signs


Nursing Vital Signs: 


 Initial Vital Signs











Temperature  98.5 F   12/25/19 16:45


 


Pulse Rate  73   12/25/19 16:45


 


Respiratory Rate  20   12/25/19 16:45


 


Blood Pressure  100/79   12/25/19 16:45


 


O2 Sat by Pulse Oximetry  95   12/25/19 16:45








 Pain Scale











Pain Intensity                 9

















- Physical Exam


General Appearance: no apparent distress, alert


Eye Exam: PERRL/EOMI, eyes nml inspection


Ears, Nose, Throat Exam: normal ENT inspection, pharynx normal, moist mucous 

membranes


Neck Exam: normal inspection, non-tender, supple, full range of motion


Respiratory Exam: normal breath sounds, lungs clear, No respiratory distress


Cardiovascular Exam: regular rate/rhythm, normal heart sounds


Gastrointestinal/Abdomen Exam: soft, tenderness (diffuse tenderness,), 

distention (diffuse), hernia, No mass, No guarding, No pulsatile mass, No 

rebound, No hepatomegaly, No organomegaly


Back Exam: normal inspection, normal range of motion, No CVA tenderness, No 

vertebral tenderness


Extremity Exam: normal inspection, normal range of motion, pelvis stable


Neurologic Exam: alert, oriented x 3, cooperative, normal mood/affect, nml 

cerebellar function, sensation nml, No motor deficits


Skin Exam: normal color, warm, dry





- Course


Nursing assessment & vital signs reviewed: Yes


Ordered Tests: 


 Active Orders 24 hr











 Category Date Time Status


 


 IV Insertion STAT Care  12/25/19 17:16 Active


 


 NG to  Suction (Insertion) ROUTINE Care  12/25/19 18:56 Ordered


 


 ABDOMEN AND PELVIS W CONTRAST [CT] Stat Exams  12/25/19 17:17 Taken


 


 CBC W DIFF Stat Lab  12/25/19 17:40 Completed


 


 CMP Stat Lab  12/25/19 17:40 Completed


 


 LIPASE Stat Lab  12/25/19 17:40 Completed


 


 UA W/RFX UR CULTURE Stat Lab  12/25/19 17:16 Uncollected








Medication Summary














Discontinued Medications














Generic Name Dose Route Start Last Admin





  Trade Name Kelley  PRN Reason Stop Dose Admin


 


Hydromorphone HCl  1 mg  12/25/19 18:42  12/25/19 18:53





  Hydromorphone 1 Mg/Ml Ampule***  IV  12/25/19 18:43  1 mg





  STAT ONE   Administration





     





     





     





     


 


Hydromorphone HCl  Confirm  12/25/19 18:52  





  Hydromorphone 1 Mg/Ml Ampule***  Administered  12/25/19 18:53  





  Dose   





  1 mg   





  .ROUTE   





  .STK-MED ONE   





     





     





     





     


 


Sodium Chloride  1,000 mls @ 999 mls/hr  12/25/19 17:16  12/25/19 18:26





  Sodium Chloride 0.9% 1000 Ml  IV  12/25/19 18:16  Infused





  .Q1H1M STA   Infusion





     





     





     





     


 


Sodium Chloride  Confirm  12/25/19 17:23  





  Sodium Chloride 0.9% 1000 Ml  Administered  12/25/19 17:24  





  Dose   





  1,000 mls @ ud   





  .ROUTE   





  .STK-MED ONE   





     





     





     





     


 


Morphine Sulfate  2 mg  12/25/19 17:16  12/25/19 17:25





  Morphine Sulfate 2 Mg Inj***  IV  12/25/19 17:17  2 mg





  STAT ONE   Administration





     





     





     





     


 


Morphine Sulfate  Confirm  12/25/19 17:23  





  Morphine Sulfate 2 Mg Inj***  Administered  12/25/19 17:24  





  Dose   





  2 mg   





  .ROUTE   





  .STK-MED ONE   





     





     





     





     


 


Ondansetron HCl  4 mg  12/25/19 17:16  12/25/19 17:25





  Zofran 4 Mg/2 Ml Vial**  IV  12/25/19 17:17  4 mg





  STAT ONE   Administration





     





     





     





     


 


Ondansetron HCl  Confirm  12/25/19 17:17  





  Zofran 4 Mg/2 Ml Vial**  Administered  12/25/19 17:18  





  Dose   





  4 mg   





  .ROUTE   





  .STK-MED ONE   





     





     





     





     











Lab/Rad Data: 


 Laboratory Result Diagrams





 12/25/19 17:40 





 12/25/19 17:40 





 Laboratory Results











  12/25/19 12/25/19 Range/Units





  17:40 17:40 


 


WBC   12.9 H  (4.0-10.5)  K/mm3


 


RBC   3.67 L  (4.1-5.4)  M/mm3


 


Hgb   11.2 L  (12.0-16.0)  gm/dl


 


Hct   36.1  (35-47)  %


 


MCV   98.4  ()  fl


 


MCH   30.5  (26-32)  pg


 


MCHC   31.0 L  (32-36)  g/dl


 


RDW   13.8  (11.5-14.0)  %


 


Plt Count   278  (150-450)  K/mm3


 


MPV   10.1 H  (6-9.5)  fl


 


Gran %   73.4 H  (36.0-66.0)  %


 


Eos # (Auto)   0.72 H  (0-0.5)  


 


Absolute Lymphs (auto)   1.89  (1.0-4.6)  


 


Absolute Monos (auto)   0.79  (0.0-1.3)  


 


Lymphocytes %   14.7 L  (24.0-44.0)  %


 


Monocytes %   6.1  (0.0-12.0)  %


 


Eosinophils %   5.6 H  (0.00-5.0)  %


 


Basophils %   0.2  (0.0-0.4)  %


 


Absolute Granulocytes   9.43 H  (1.4-6.9)  


 


Basophils #   0.02  (0-0.4)  


 


Sodium  141   (137-145)  mmol/L


 


Potassium  3.8   (3.5-5.1)  mmol/L


 


Chloride  94 L   ()  mmol/L


 


Carbon Dioxide  39 H   (22-30)  mmol/L


 


Anion Gap  11.4   (5-15)  MEQ/L


 


BUN  13   (7-17)  mg/dL


 


Creatinine  1.03   (0.52-1.04)  mg/dL


 


Estimated GFR  57.9   ML/MIN


 


Glucose  99   ()  mg/dL


 


Calcium  9.5   (8.4-10.2)  mg/dL


 


Total Bilirubin  0.30   (0.2-1.3)  mg/dL


 


AST  18   (14-36)  U/L


 


ALT  19   (0-35)  U/L


 


Alkaline Phosphatase  91   ()  U/L


 


Serum Total Protein  7.8   (6.3-8.2)  g/dL


 


Albumin  4.3   (3.5-5.0)  g/dL


 


Lipase  60   ()  U/L














- Progress


Progress: unchanged


Progress Note: 





12/25/19 18:51


as per the radiologist, patient has a partial small bowel obstruction.


Discussed with : Travon


Will see patient in: hospital (full admit), other (he told made to admit the 

patient under Dr. Owens.)


Counseled pt/family regarding: diagnosis





- Departure


Departure Disposition: In-patient Admission


Clinical Impression: 


 Small bowel obstruction





Condition: Stable


Critical Care Time: No


Referrals: 


MARCELINO OWENS [Primary Care Provider] -

## 2019-12-25 NOTE — XRAY
Indication: Abdomen pain.



Multiple contiguous axial images obtained through the abdomen and pelvis using

80 cc of Isovue-370 contrast only.



Comparison: July 13, 2016.



Lung bases again demonstrates bibasilar dependent atelectasis, right greater

than left.  Heart is not enlarged.  New small hiatal hernia.



Stomach is distended with food/fluid.  Stable widemouth midline ventral hernia

again with herniated small/large loops.  Small bowel loops are now uniformly

fluid distended up to 3 cm with fluid leveling, ileus versus enteritis.  There

is distal colonic bowel gas.  Normal appendix.  Again previous

cholecystectomy.  Stable left renal scarring with cyst.  No free fluid/air.



Remaining liver, pancreas, spleen, adrenal glands, kidneys, ureters, bladder,

and uterus appear unremarkable.  Stable scattered aortoiliac calcifications.

No AAA or pathologic retroperitoneal lymphadenopathy.



Osseous structures intact began with mild degenerative changes throughout the

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



Impression:

1.  Stable widemouth ventral hernia with herniated small/large loops without

obstruction/incarceration.

2.  New uniformly fluid distended small bowel loops with fluid leveling, ileus

versus enteritis.

3.  New small hiatal hernia.

4.  Stable left renal scarring with cyst.



Comment: Preliminary interpretation was made by [facility].  No critical discrepancy.



CTDI 19.23

## 2019-12-26 RX ADMIN — FLUTICASONE PROPIONATE AND SALMETEROL XINAFOATE SCH PUFF: 230; 21 AEROSOL, METERED RESPIRATORY (INHALATION) at 07:11

## 2019-12-26 RX ADMIN — CARVEDILOL SCH MG: 12.5 TABLET, FILM COATED ORAL at 22:15

## 2019-12-26 RX ADMIN — SIMVASTATIN SCH MG: 10 TABLET, FILM COATED ORAL at 00:35

## 2019-12-26 RX ADMIN — CARVEDILOL SCH MG: 12.5 TABLET, FILM COATED ORAL at 00:35

## 2019-12-26 RX ADMIN — TRAZODONE HYDROCHLORIDE SCH MG: 150 TABLET ORAL at 22:15

## 2019-12-26 RX ADMIN — FLUTICASONE PROPIONATE AND SALMETEROL XINAFOATE SCH PUFF: 230; 21 AEROSOL, METERED RESPIRATORY (INHALATION) at 17:10

## 2019-12-26 RX ADMIN — HYDRALAZINE HYDROCHLORIDE SCH MG: 25 TABLET ORAL at 22:14

## 2019-12-26 RX ADMIN — FERROUS SULFATE TAB 325 MG (65 MG ELEMENTAL FE) SCH MG: 325 (65 FE) TAB at 00:35

## 2019-12-26 RX ADMIN — FAMOTIDINE SCH MG: 20 TABLET, FILM COATED ORAL at 09:33

## 2019-12-26 RX ADMIN — POTASSIUM CHLORIDE SCH MEQ: 10 TABLET, EXTENDED RELEASE ORAL at 22:15

## 2019-12-26 RX ADMIN — FERROUS SULFATE TAB 325 MG (65 MG ELEMENTAL FE) SCH MG: 325 (65 FE) TAB at 09:34

## 2019-12-26 RX ADMIN — POTASSIUM CHLORIDE SCH MEQ: 10 TABLET, EXTENDED RELEASE ORAL at 00:34

## 2019-12-26 RX ADMIN — TRAZODONE HYDROCHLORIDE SCH MG: 150 TABLET ORAL at 00:35

## 2019-12-26 RX ADMIN — GABAPENTIN SCH MG: 300 CAPSULE ORAL at 09:33

## 2019-12-26 RX ADMIN — FUROSEMIDE SCH MG: 40 TABLET ORAL at 17:16

## 2019-12-26 RX ADMIN — HYDRALAZINE HYDROCHLORIDE SCH MG: 25 TABLET ORAL at 09:34

## 2019-12-26 RX ADMIN — GABAPENTIN SCH MG: 300 CAPSULE ORAL at 22:15

## 2019-12-26 RX ADMIN — POTASSIUM CHLORIDE SCH MEQ: 10 TABLET, EXTENDED RELEASE ORAL at 09:33

## 2019-12-26 RX ADMIN — CARVEDILOL SCH MG: 12.5 TABLET, FILM COATED ORAL at 09:33

## 2019-12-26 RX ADMIN — HYDROMORPHONE HYDROCHLORIDE PRN MG: 2 INJECTION, SOLUTION INTRAMUSCULAR; INTRAVENOUS; SUBCUTANEOUS at 11:28

## 2019-12-26 RX ADMIN — ALPRAZOLAM SCH MG: 0.5 TABLET ORAL at 22:15

## 2019-12-26 RX ADMIN — SIMVASTATIN SCH MG: 10 TABLET, FILM COATED ORAL at 22:15

## 2019-12-26 RX ADMIN — GABAPENTIN SCH MG: 300 CAPSULE ORAL at 00:34

## 2019-12-26 RX ADMIN — ALPRAZOLAM SCH MG: 0.5 TABLET ORAL at 09:33

## 2019-12-26 RX ADMIN — ALPRAZOLAM SCH MG: 0.5 TABLET ORAL at 00:35

## 2019-12-26 RX ADMIN — ESCITALOPRAM OXALATE SCH MG: 10 TABLET ORAL at 09:33

## 2019-12-26 RX ADMIN — FERROUS SULFATE TAB 325 MG (65 MG ELEMENTAL FE) SCH MG: 325 (65 FE) TAB at 22:14

## 2019-12-26 RX ADMIN — ALPRAZOLAM SCH MG: 0.5 TABLET ORAL at 15:10

## 2019-12-26 RX ADMIN — HYDROMORPHONE HYDROCHLORIDE PRN MG: 2 INJECTION, SOLUTION INTRAMUSCULAR; INTRAVENOUS; SUBCUTANEOUS at 20:08

## 2019-12-26 RX ADMIN — FUROSEMIDE SCH MG: 40 TABLET ORAL at 09:34

## 2019-12-26 RX ADMIN — FUROSEMIDE SCH: 40 TABLET ORAL at 00:53

## 2019-12-26 RX ADMIN — POLYETHYLENE GLYCOL 3350 SCH: 17 POWDER, FOR SOLUTION ORAL at 09:32

## 2019-12-26 RX ADMIN — HYDROMORPHONE HYDROCHLORIDE PRN MG: 2 INJECTION, SOLUTION INTRAMUSCULAR; INTRAVENOUS; SUBCUTANEOUS at 15:48

## 2019-12-27 LAB
ALBUMIN SERPL-MCNC: 3.4 G/DL (ref 3.5–5)
ALP SERPL-CCNC: 86 U/L (ref 38–126)
ALT SERPL-CCNC: 18 U/L (ref 0–35)
ANION GAP SERPL CALC-SCNC: 8.6 MEQ/L (ref 5–15)
AST SERPL QL: 20 U/L (ref 14–36)
BASOPHILS # BLD AUTO: 0.02 10*3/UL (ref 0–0.4)
BASOPHILS NFR BLD AUTO: 0.2 % (ref 0–0.4)
BILIRUB BLD-MCNC: 0.5 MG/DL (ref 0.2–1.3)
BUN SERPL-MCNC: 10 MG/DL (ref 7–17)
CALCIUM SPEC-MCNC: 8.3 MG/DL (ref 8.4–10.2)
CHLORIDE SERPL-SCNC: 101 MMOL/L (ref 98–107)
CO2 SERPL-SCNC: 34 MMOL/L (ref 22–30)
CREAT SERPL-MCNC: 0.82 MG/DL (ref 0.52–1.04)
EOSINOPHIL # BLD AUTO: 0.39 10*3/UL (ref 0–0.5)
GLUCOSE SERPL-MCNC: 127 MG/DL (ref 74–106)
HCT VFR BLD AUTO: 34 % (ref 35–47)
HGB BLD-MCNC: 10.3 GM/DL (ref 12–16)
LYMPHOCYTES # SPEC AUTO: 1.18 10*3/UL (ref 1–4.6)
MCH RBC QN AUTO: 30.4 PG (ref 26–32)
MCHC RBC AUTO-ENTMCNC: 30.3 G/DL (ref 32–36)
MONOCYTES # BLD AUTO: 1.07 10*3/UL (ref 0–1.3)
PLATELET # BLD AUTO: 231 K/MM3 (ref 150–450)
POTASSIUM SERPLBLD-SCNC: 4 MMOL/L (ref 3.5–5.1)
PROT SERPL-MCNC: 6.4 G/DL (ref 6.3–8.2)
RBC # BLD AUTO: 3.39 M/MM3 (ref 4.1–5.4)
SODIUM SERPL-SCNC: 139 MMOL/L (ref 137–145)
WBC # BLD AUTO: 12.3 K/MM3 (ref 4–10.5)

## 2019-12-27 RX ADMIN — ALPRAZOLAM SCH MG: 0.5 TABLET ORAL at 14:38

## 2019-12-27 RX ADMIN — POLYETHYLENE GLYCOL 3350 SCH GM: 17 POWDER, FOR SOLUTION ORAL at 09:55

## 2019-12-27 RX ADMIN — CARVEDILOL SCH MG: 12.5 TABLET, FILM COATED ORAL at 21:21

## 2019-12-27 RX ADMIN — ALPRAZOLAM SCH MG: 0.5 TABLET ORAL at 21:21

## 2019-12-27 RX ADMIN — POTASSIUM CHLORIDE SCH MEQ: 10 TABLET, EXTENDED RELEASE ORAL at 21:21

## 2019-12-27 RX ADMIN — HYDROMORPHONE HYDROCHLORIDE PRN MG: 2 INJECTION, SOLUTION INTRAMUSCULAR; INTRAVENOUS; SUBCUTANEOUS at 21:21

## 2019-12-27 RX ADMIN — HYDROMORPHONE HYDROCHLORIDE PRN MG: 2 INJECTION, SOLUTION INTRAMUSCULAR; INTRAVENOUS; SUBCUTANEOUS at 16:51

## 2019-12-27 RX ADMIN — TRAZODONE HYDROCHLORIDE SCH MG: 150 TABLET ORAL at 21:21

## 2019-12-27 RX ADMIN — HYDROMORPHONE HYDROCHLORIDE PRN MG: 2 INJECTION, SOLUTION INTRAMUSCULAR; INTRAVENOUS; SUBCUTANEOUS at 04:42

## 2019-12-27 RX ADMIN — FLUTICASONE PROPIONATE AND SALMETEROL XINAFOATE SCH PUFF: 230; 21 AEROSOL, METERED RESPIRATORY (INHALATION) at 19:55

## 2019-12-27 RX ADMIN — GABAPENTIN SCH MG: 300 CAPSULE ORAL at 21:20

## 2019-12-27 RX ADMIN — FAMOTIDINE SCH MG: 20 TABLET, FILM COATED ORAL at 09:56

## 2019-12-27 RX ADMIN — ALPRAZOLAM SCH MG: 0.5 TABLET ORAL at 09:56

## 2019-12-27 RX ADMIN — HYDROMORPHONE HYDROCHLORIDE PRN MG: 2 INJECTION, SOLUTION INTRAMUSCULAR; INTRAVENOUS; SUBCUTANEOUS at 00:11

## 2019-12-27 RX ADMIN — SIMVASTATIN SCH MG: 10 TABLET, FILM COATED ORAL at 21:21

## 2019-12-27 RX ADMIN — HYDRALAZINE HYDROCHLORIDE SCH MG: 25 TABLET ORAL at 09:55

## 2019-12-27 RX ADMIN — FERROUS SULFATE TAB 325 MG (65 MG ELEMENTAL FE) SCH MG: 325 (65 FE) TAB at 21:21

## 2019-12-27 RX ADMIN — POTASSIUM CHLORIDE SCH MEQ: 10 TABLET, EXTENDED RELEASE ORAL at 09:56

## 2019-12-27 RX ADMIN — ESCITALOPRAM OXALATE SCH MG: 10 TABLET ORAL at 09:56

## 2019-12-27 RX ADMIN — HYDRALAZINE HYDROCHLORIDE SCH MG: 25 TABLET ORAL at 21:21

## 2019-12-27 RX ADMIN — GABAPENTIN SCH MG: 300 CAPSULE ORAL at 09:56

## 2019-12-27 RX ADMIN — FERROUS SULFATE TAB 325 MG (65 MG ELEMENTAL FE) SCH MG: 325 (65 FE) TAB at 09:57

## 2019-12-27 RX ADMIN — HYDROMORPHONE HYDROCHLORIDE PRN MG: 2 INJECTION, SOLUTION INTRAMUSCULAR; INTRAVENOUS; SUBCUTANEOUS at 08:38

## 2019-12-27 RX ADMIN — HYDROMORPHONE HYDROCHLORIDE PRN MG: 2 INJECTION, SOLUTION INTRAMUSCULAR; INTRAVENOUS; SUBCUTANEOUS at 12:39

## 2019-12-27 RX ADMIN — CARVEDILOL SCH MG: 12.5 TABLET, FILM COATED ORAL at 09:56

## 2019-12-27 RX ADMIN — FLUTICASONE PROPIONATE AND SALMETEROL XINAFOATE SCH PUFF: 230; 21 AEROSOL, METERED RESPIRATORY (INHALATION) at 10:10

## 2019-12-27 RX ADMIN — FUROSEMIDE SCH MG: 40 TABLET ORAL at 18:08

## 2019-12-27 RX ADMIN — FUROSEMIDE SCH MG: 40 TABLET ORAL at 09:56

## 2019-12-27 NOTE — CONS
CONSULT DATE:    12/26/19



REASON FOR CONSULTATION:  Possible bowel obstruction.



HISTORY OF PRESENT ILLNESS:  Patient is morbidly obese and she has medical conditions 
including hypoxemia on oxygen. She is an extremely high medical risk. She has a 
questionable small bowel obstruction vs ileus vs incarceration. She does have ventral 
hernia right upper quadrant, fairly large. It is incarcerated, but it is not strangulated. 
It feels fairly soft. She is passing a little bit of air.



IMPRESSION:

I BELIEVE SHE HAS INCARCERATED HERNIA WHICH MAYBE HAS BEEN BLOCKED. IT SHOULD OPEN UP ON 
ITS OWN. IT HAS GOT A FAIRLY LARGE NECK TO THE HERNIA SAC AND IT IS NOT TIGHT AT THIS 
TIME. SHE IS AN EXTREME MEDICAL RISK. WOULD NOT START BY OPERATING ON THIS PATIENT. 



PLAN:  She has already pulled out her nasogastric tube. We will continue NPO and IV 
fluids. Hopefully, this will resolve.

## 2019-12-28 LAB
ALBUMIN SERPL-MCNC: 2.8 G/DL (ref 3.5–5)
ALP SERPL-CCNC: 73 U/L (ref 38–126)
ALT SERPL-CCNC: 14 U/L (ref 0–35)
ANION GAP SERPL CALC-SCNC: 9 MEQ/L (ref 5–15)
AST SERPL QL: 16 U/L (ref 14–36)
BASOPHILS # BLD AUTO: 0.01 10*3/UL (ref 0–0.4)
BASOPHILS NFR BLD AUTO: 0.1 % (ref 0–0.4)
BILIRUB BLD-MCNC: 0.3 MG/DL (ref 0.2–1.3)
BUN SERPL-MCNC: 9 MG/DL (ref 7–17)
CALCIUM SPEC-MCNC: 8 MG/DL (ref 8.4–10.2)
CHLORIDE SERPL-SCNC: 103 MMOL/L (ref 98–107)
CO2 SERPL-SCNC: 35 MMOL/L (ref 22–30)
CREAT SERPL-MCNC: 0.83 MG/DL (ref 0.52–1.04)
EOSINOPHIL # BLD AUTO: 0.43 10*3/UL (ref 0–0.5)
GLUCOSE SERPL-MCNC: 90 MG/DL (ref 74–106)
HCT VFR BLD AUTO: 30.9 % (ref 35–47)
HGB BLD-MCNC: 9 GM/DL (ref 12–16)
LYMPHOCYTES # SPEC AUTO: 1.23 10*3/UL (ref 1–4.6)
MCH RBC QN AUTO: 29.9 PG (ref 26–32)
MCHC RBC AUTO-ENTMCNC: 29.1 G/DL (ref 32–36)
MONOCYTES # BLD AUTO: 0.67 10*3/UL (ref 0–1.3)
PLATELET # BLD AUTO: 224 K/MM3 (ref 150–450)
POTASSIUM SERPLBLD-SCNC: 3.9 MMOL/L (ref 3.5–5.1)
PROT SERPL-MCNC: 5.7 G/DL (ref 6.3–8.2)
RBC # BLD AUTO: 3.01 M/MM3 (ref 4.1–5.4)
SODIUM SERPL-SCNC: 143 MMOL/L (ref 137–145)
WBC # BLD AUTO: 9.8 K/MM3 (ref 4–10.5)

## 2019-12-28 RX ADMIN — POLYETHYLENE GLYCOL 3350 SCH GM: 17 POWDER, FOR SOLUTION ORAL at 10:21

## 2019-12-28 RX ADMIN — HYDROMORPHONE HYDROCHLORIDE PRN MG: 2 INJECTION, SOLUTION INTRAMUSCULAR; INTRAVENOUS; SUBCUTANEOUS at 12:55

## 2019-12-28 RX ADMIN — FERROUS SULFATE TAB 325 MG (65 MG ELEMENTAL FE) SCH MG: 325 (65 FE) TAB at 10:18

## 2019-12-28 RX ADMIN — FUROSEMIDE SCH MG: 40 TABLET ORAL at 10:19

## 2019-12-28 RX ADMIN — FAMOTIDINE SCH MG: 20 TABLET, FILM COATED ORAL at 10:17

## 2019-12-28 RX ADMIN — POTASSIUM CHLORIDE SCH MEQ: 10 TABLET, EXTENDED RELEASE ORAL at 21:27

## 2019-12-28 RX ADMIN — HYDROMORPHONE HYDROCHLORIDE PRN MG: 2 INJECTION, SOLUTION INTRAMUSCULAR; INTRAVENOUS; SUBCUTANEOUS at 17:43

## 2019-12-28 RX ADMIN — GABAPENTIN SCH MG: 300 CAPSULE ORAL at 21:29

## 2019-12-28 RX ADMIN — ALPRAZOLAM SCH MG: 0.5 TABLET ORAL at 15:22

## 2019-12-28 RX ADMIN — SIMVASTATIN SCH MG: 10 TABLET, FILM COATED ORAL at 21:28

## 2019-12-28 RX ADMIN — HYDROMORPHONE HYDROCHLORIDE PRN MG: 2 INJECTION, SOLUTION INTRAMUSCULAR; INTRAVENOUS; SUBCUTANEOUS at 08:28

## 2019-12-28 RX ADMIN — FLUTICASONE PROPIONATE AND SALMETEROL XINAFOATE SCH PUFF: 230; 21 AEROSOL, METERED RESPIRATORY (INHALATION) at 19:08

## 2019-12-28 RX ADMIN — CARVEDILOL SCH MG: 12.5 TABLET, FILM COATED ORAL at 10:20

## 2019-12-28 RX ADMIN — FLUTICASONE PROPIONATE AND SALMETEROL XINAFOATE SCH PUFF: 230; 21 AEROSOL, METERED RESPIRATORY (INHALATION) at 14:12

## 2019-12-28 RX ADMIN — ESCITALOPRAM OXALATE SCH MG: 10 TABLET ORAL at 10:20

## 2019-12-28 RX ADMIN — POTASSIUM CHLORIDE SCH MEQ: 10 TABLET, EXTENDED RELEASE ORAL at 10:17

## 2019-12-28 RX ADMIN — ALPRAZOLAM SCH MG: 0.5 TABLET ORAL at 10:18

## 2019-12-28 RX ADMIN — HYDROMORPHONE HYDROCHLORIDE PRN MG: 2 INJECTION, SOLUTION INTRAMUSCULAR; INTRAVENOUS; SUBCUTANEOUS at 03:58

## 2019-12-28 RX ADMIN — HYDRALAZINE HYDROCHLORIDE SCH MG: 25 TABLET ORAL at 10:18

## 2019-12-28 RX ADMIN — FUROSEMIDE SCH MG: 40 TABLET ORAL at 17:01

## 2019-12-28 RX ADMIN — HYDRALAZINE HYDROCHLORIDE SCH MG: 25 TABLET ORAL at 21:27

## 2019-12-28 RX ADMIN — ALPRAZOLAM SCH MG: 0.5 TABLET ORAL at 21:29

## 2019-12-28 RX ADMIN — TRAZODONE HYDROCHLORIDE SCH MG: 150 TABLET ORAL at 21:29

## 2019-12-28 RX ADMIN — GABAPENTIN SCH MG: 300 CAPSULE ORAL at 10:21

## 2019-12-28 RX ADMIN — HYDROMORPHONE HYDROCHLORIDE PRN MG: 2 INJECTION, SOLUTION INTRAMUSCULAR; INTRAVENOUS; SUBCUTANEOUS at 22:03

## 2019-12-28 RX ADMIN — FERROUS SULFATE TAB 325 MG (65 MG ELEMENTAL FE) SCH MG: 325 (65 FE) TAB at 21:28

## 2019-12-28 RX ADMIN — CARVEDILOL SCH MG: 12.5 TABLET, FILM COATED ORAL at 21:28

## 2019-12-28 NOTE — PCM.NOTE
Date and Time: 12/28/19  1027





Subjective Assessment: 





bowels have moved, still has some pain in the abdomen but feels thirsty. 

Passing flatus





Objective Exam


General Appearance: no apparent distress, obese


Skin Exam: normal color, warm, dry


Respiratory Exam: normal breath sounds, lungs clear, No respiratory distress


Cardiovascular Exam: regular rate/rhythm, normal heart sounds


Gastrointestinal/Abdomen Exam: soft, normal bowel sounds, other (large 

reducible incisional hernia to the right of the midline), No guarding, No 

rebound


Extremity Exam: normal inspection, normal range of motion





OBJECTIVE DATA


Vital Signs: 


 Vital Signs - 24 hr











  Temp Pulse Resp BP Pulse Ox


 


 12/28/19 07:05  98.1 F  60  20  101/51  97


 


 12/28/19 04:00  98.3 F  63  22  140/64  97


 


 12/28/19 00:45     100/65 


 


 12/28/19 00:00  97.8 F  60  14  88/44  97


 


 12/27/19 20:00  98.2 F  64  20  101/50  98


 


 12/27/19 19:58   68  18   99


 


 12/27/19 16:00  98.4 F  65  18  101/57  97


 


 12/27/19 12:00  98 F  67  20  103/52  97








 Oxygen-Last 24 hours











O2 Percentage                  3 Liters = 32%


 


O2 Percentage                  3 Liters = 32%


 


O2 Percentage                  3 Liters = 32%


 


O2 Percentage                  3 Liters = 32%


 


O2 Percentage                  3 Liters = 32%


 


O2 Percentage                  3 Liters = 32%











 Pain Assessment - Last Documented











Pain Intensity                 9


 


Pain Scale Used                0-10 Pain Scale











Intake and Output: 


 Intake & Output











 12/25/19 12/26/19 12/27/19 12/28/19





 11:59 11:59 11:59 11:59


 


Intake Total   2405 2759


 


Output Total  850  


 


Balance  -850 2405 2759


 


Weight  95.5 kg  95.5 kg











Lab Results: 


 Lab Results-Last 24 Hours











  12/28/19 12/28/19 Range/Units





  05:32 05:32 


 


WBC  9.8   (4.0-10.5)  K/mm3


 


RBC  3.01 L   (4.1-5.4)  M/mm3


 


Hgb  9.0 L   (12.0-16.0)  gm/dl


 


Hct  30.9 L   (35-47)  %


 


MCV  102.7 H   ()  fl


 


MCH  29.9   (26-32)  pg


 


MCHC  29.1 L   (32-36)  g/dl


 


RDW  13.8   (11.5-14.0)  %


 


Plt Count  224   (150-450)  K/mm3


 


MPV  9.6 H   (6-9.5)  fl


 


Gran %  76.2 H   (36.0-66.0)  %


 


Eos # (Auto)  0.43   (0-0.5)  


 


Absolute Lymphs (auto)  1.23   (1.0-4.6)  


 


Absolute Monos (auto)  0.67   (0.0-1.3)  


 


Lymphocytes %  12.5 L   (24.0-44.0)  %


 


Monocytes %  6.8   (0.0-12.0)  %


 


Eosinophils %  4.4   (0.00-5.0)  %


 


Basophils %  0.1   (0.0-0.4)  %


 


Absolute Granulocytes  7.50 H   (1.4-6.9)  


 


Basophils #  0.01   (0-0.4)  


 


Sodium   143  (137-145)  mmol/L


 


Potassium   3.9  (3.5-5.1)  mmol/L


 


Chloride   103  ()  mmol/L


 


Carbon Dioxide   35 H  (22-30)  mmol/L


 


Anion Gap   9.0  (5-15)  MEQ/L


 


BUN   9  (7-17)  mg/dL


 


Creatinine   0.83  (0.52-1.04)  mg/dL


 


Estimated GFR   > 60.0  ML/MIN


 


Glucose   90  ()  mg/dL


 


Calcium   8.0 L  (8.4-10.2)  mg/dL


 


Total Bilirubin   0.30  (0.2-1.3)  mg/dL


 


AST   16  (14-36)  U/L


 


ALT   14  (0-35)  U/L


 


Alkaline Phosphatase   73  ()  U/L


 


Serum Total Protein   5.7 L  (6.3-8.2)  g/dL


 


Albumin   2.8 L  (3.5-5.0)  g/dL











Multi-Disciplinary Progress Notes: 


 Multi-Disciplinary Progress Notes





12/27/19 13:09 Case Management Note by Stefanie Fountain


DISCHARGE PLAN REVIEWED, CONTINUES TO PLAN TO RETURN HOME TO PRE EPISODIC LEVEL 

OF [facility].  ALL EQUIP AT HOME IN WORKING CONDITION.  CASE MANAGEMENT WILL FOLLOW 

FOR ALL DC NEEDS.





Initialized on 12/27/19 13:09 - END OF NOTE

















Assessment/Plan


(1) Small bowel obstruction


Current Visit: Yes   Status: Acute   


Assessment & Plan: 


appears to be resolving, will start clear liquids and advance as tolerated


Code(s): K56.609 - UNSP INTESTNL OBST, UNSP AS TO PARTIAL VERSUS COMPLETE OBST 

  





(2) CHF (congestive heart failure)


Current Visit: No   Status: Acute   Code(s): I50.9 - HEART FAILURE, UNSPECIFIED

   





(3) COPD (chronic obstructive pulmonary disease)


Current Visit: No   Status: Acute

## 2019-12-29 VITALS — HEART RATE: 73 BPM | SYSTOLIC BLOOD PRESSURE: 152 MMHG | OXYGEN SATURATION: 95 % | DIASTOLIC BLOOD PRESSURE: 70 MMHG

## 2019-12-29 LAB
ALBUMIN SERPL-MCNC: 3.1 G/DL (ref 3.5–5)
ALP SERPL-CCNC: 68 U/L (ref 38–126)
ALT SERPL-CCNC: 12 U/L (ref 0–35)
ANION GAP SERPL CALC-SCNC: 7.4 MEQ/L (ref 5–15)
AST SERPL QL: 11 U/L (ref 14–36)
BASOPHILS # BLD AUTO: 0.02 10*3/UL (ref 0–0.4)
BASOPHILS NFR BLD AUTO: 0.2 % (ref 0–0.4)
BILIRUB BLD-MCNC: 0.2 MG/DL (ref 0.2–1.3)
BUN SERPL-MCNC: 7 MG/DL (ref 7–17)
CALCIUM SPEC-MCNC: 8.6 MG/DL (ref 8.4–10.2)
CHLORIDE SERPL-SCNC: 100 MMOL/L (ref 98–107)
CO2 SERPL-SCNC: 37 MMOL/L (ref 22–30)
CREAT SERPL-MCNC: 0.81 MG/DL (ref 0.52–1.04)
EOSINOPHIL # BLD AUTO: 0.41 10*3/UL (ref 0–0.5)
GLUCOSE SERPL-MCNC: 104 MG/DL (ref 74–106)
HCT VFR BLD AUTO: 30.9 % (ref 35–47)
HGB BLD-MCNC: 9.3 GM/DL (ref 12–16)
LYMPHOCYTES # SPEC AUTO: 1.55 10*3/UL (ref 1–4.6)
MCH RBC QN AUTO: 30.5 PG (ref 26–32)
MCHC RBC AUTO-ENTMCNC: 30.1 G/DL (ref 32–36)
MONOCYTES # BLD AUTO: 0.63 10*3/UL (ref 0–1.3)
PLATELET # BLD AUTO: 236 K/MM3 (ref 150–450)
POTASSIUM SERPLBLD-SCNC: 3.8 MMOL/L (ref 3.5–5.1)
PROT SERPL-MCNC: 5.8 G/DL (ref 6.3–8.2)
RBC # BLD AUTO: 3.05 M/MM3 (ref 4.1–5.4)
SODIUM SERPL-SCNC: 140 MMOL/L (ref 137–145)
WBC # BLD AUTO: 8.2 K/MM3 (ref 4–10.5)

## 2019-12-29 RX ADMIN — HYDROMORPHONE HYDROCHLORIDE PRN MG: 2 INJECTION, SOLUTION INTRAMUSCULAR; INTRAVENOUS; SUBCUTANEOUS at 07:30

## 2019-12-29 RX ADMIN — GABAPENTIN SCH MG: 300 CAPSULE ORAL at 10:14

## 2019-12-29 RX ADMIN — ALPRAZOLAM SCH MG: 0.5 TABLET ORAL at 10:14

## 2019-12-29 RX ADMIN — CARVEDILOL SCH MG: 12.5 TABLET, FILM COATED ORAL at 10:15

## 2019-12-29 RX ADMIN — FUROSEMIDE SCH MG: 40 TABLET ORAL at 10:15

## 2019-12-29 RX ADMIN — POTASSIUM CHLORIDE SCH MEQ: 10 TABLET, EXTENDED RELEASE ORAL at 10:12

## 2019-12-29 RX ADMIN — HYDRALAZINE HYDROCHLORIDE SCH MG: 25 TABLET ORAL at 10:15

## 2019-12-29 RX ADMIN — FERROUS SULFATE TAB 325 MG (65 MG ELEMENTAL FE) SCH MG: 325 (65 FE) TAB at 10:14

## 2019-12-29 RX ADMIN — HYDROMORPHONE HYDROCHLORIDE PRN MG: 2 INJECTION, SOLUTION INTRAMUSCULAR; INTRAVENOUS; SUBCUTANEOUS at 02:44

## 2019-12-29 RX ADMIN — FLUTICASONE PROPIONATE AND SALMETEROL XINAFOATE SCH PUFF: 230; 21 AEROSOL, METERED RESPIRATORY (INHALATION) at 07:41

## 2019-12-29 RX ADMIN — POLYETHYLENE GLYCOL 3350 SCH GM: 17 POWDER, FOR SOLUTION ORAL at 10:12

## 2019-12-29 RX ADMIN — FAMOTIDINE SCH MG: 20 TABLET, FILM COATED ORAL at 10:15

## 2019-12-29 RX ADMIN — ESCITALOPRAM OXALATE SCH MG: 10 TABLET ORAL at 10:13

## 2019-12-29 NOTE — PCM.DS
Discharge Summary


Date of Admission: 


12/25/19 20:12





Admitting Physician: 


MARCELINO BELLA





Consults: 





 Consults on Case





12/25/19 20:54


Consult Surgery ROUTINE 











Primary Care Provider: 


MARCELINO BELLA








Allergies


Allergies





No Known Drug Allergies Allergy (Verified 12/25/19 16:58)


 











Hospital Summary





- Hospital Course


Hospital Course: 





patient was admitted with abdominal pain and nausea, partial SBO. resolved with 

no intervention, she has a very large incisional/ventral hernia but is a poor 

surgical candidate. she is tolerating po intake normally, no vomiting, pain is 

controlled and she has had flatus and a bowel movement and seems back to her 

baseline at this time.





- Vitals & Intake/Output


Vital Signs: 





 Vital Signs











Temperature  97.8 F   12/29/19 07:20


 


Pulse Rate  68   12/29/19 07:41


 


Respiratory Rate  16   12/29/19 07:41


 


Blood Pressure  130/58   12/29/19 07:20


 


O2 Sat by Pulse Oximetry  98   12/29/19 07:41








 Oxygen-Last Documented











O2 Percentage                  3 Liters = 32%














Intake & Output: 





 Intake & Output











 12/26/19 12/27/19 12/28/19 12/29/19





 11:59 11:59 11:59 11:59


 


Intake Total  2405 2759 4453


 


Output Total 850   3850


 


Balance -850 2405 2759 603


 


Weight 95.5 kg  95.5 kg 














- Lab


Result Diagrams: 


 12/29/19 05:53





 12/29/19 05:53


Lab Results-Last 24 Hrs: 





 Lab Results-Last 24 Hours











  12/29/19 12/29/19 Range/Units





  05:53 05:53 


 


WBC  8.2   (4.0-10.5)  K/mm3


 


RBC  3.05 L   (4.1-5.4)  M/mm3


 


Hgb  9.3 L   (12.0-16.0)  gm/dl


 


Hct  30.9 L   (35-47)  %


 


MCV  101.3 H   ()  fl


 


MCH  30.5   (26-32)  pg


 


MCHC  30.1 L   (32-36)  g/dl


 


RDW  13.7   (11.5-14.0)  %


 


Plt Count  236   (150-450)  K/mm3


 


MPV  9.7 H   (6-9.5)  fl


 


Gran %  68.3 H   (36.0-66.0)  %


 


Eos # (Auto)  0.41   (0-0.5)  


 


Absolute Lymphs (auto)  1.55   (1.0-4.6)  


 


Absolute Monos (auto)  0.63   (0.0-1.3)  


 


Lymphocytes %  18.8 L   (24.0-44.0)  %


 


Monocytes %  7.7   (0.0-12.0)  %


 


Eosinophils %  5.0   (0.00-5.0)  %


 


Basophils %  0.2   (0.0-0.4)  %


 


Absolute Granulocytes  5.62   (1.4-6.9)  


 


Basophils #  0.02   (0-0.4)  


 


Sodium   140  (137-145)  mmol/L


 


Potassium   3.8  (3.5-5.1)  mmol/L


 


Chloride   100  ()  mmol/L


 


Carbon Dioxide   37 H  (22-30)  mmol/L


 


Anion Gap   7.4  (5-15)  MEQ/L


 


BUN   7  (7-17)  mg/dL


 


Creatinine   0.81  (0.52-1.04)  mg/dL


 


Estimated GFR   > 60.0  ML/MIN


 


Glucose   104  ()  mg/dL


 


Calcium   8.6  (8.4-10.2)  mg/dL


 


Total Bilirubin   0.20  (0.2-1.3)  mg/dL


 


AST   11 L  (14-36)  U/L


 


ALT   12  (0-35)  U/L


 


Alkaline Phosphatase   68  ()  U/L


 


Serum Total Protein   5.8 L  (6.3-8.2)  g/dL


 


Albumin   3.1 L  (3.5-5.0)  g/dL














- Procedures and Test


Procedures and Tests throughout Hospitalization: 





 Therapy Orders & Screens





12/25/19 22:10


RT Screen per Nursing Assess ONCE 


   Comment: Protocol Order


   Physician Instructions: Greater than 3 points order RT Admission Screen


   Reason For Exam: Triggered on Admission


   Diagnosis: SBO


   Diagnosis: SBO


   Pneumonia: No


   Home O2: Yes


   Asthma: No


   CHF: No


   Home CPAP/BIPAP: No


   Home Nebs/MDI: Yes


   Total Points: 10





12/25/19 23:10


Smoking Cessation Education ONCE 


   Comment: 


   Diagnosis: SBO


   Smoking Status: Current every day smoker


   How long have you smoked: 35


   Have you smoked in the past 12 months: No


   Approximately how many cigarettes per day: 40


   Do you dip or chew tobacco: No


   If,Former Smoker,when did you quit: 2014 12/25/19 23:51


Oxygen Nasal Cannula 3 lpm 


   Comment: 


   Diagnosis: SBO





12/26/19 00:01


Respiratory Therapy Assessment DAILY 


   Comment: 


   Diagnosis: SBO














Discharge Exam


General Appearance: no apparent distress, obese


Neurologic Exam: alert, oriented x 3, cooperative


Respiratory Exam: normal breath sounds, lungs clear, No respiratory distress


Cardiovascular Exam: regular rate/rhythm, normal heart sounds


Gastrointestinal/Abdomen Exam: soft, normal bowel sounds, other (large, soft 

reducible ventral/incision hernia)





Final Diagnosis/Problem List





- Final Discharge Diagnosis/Problem


(1) Small bowel obstruction


Current Visit: Yes   Status: Acute   Code(s): K56.609 - UNSP INTESTNL OBST, 

UNSP AS TO PARTIAL VERSUS COMPLETE OBST   





(2) CHF (congestive heart failure)


Current Visit: No   Status: Acute   Code(s): I50.9 - HEART FAILURE, UNSPECIFIED

   





(3) COPD (chronic obstructive pulmonary disease)


Current Visit: No   Status: Acute   





- Discharge


Disposition: Home, Self-Care


Condition: Stable


Prescriptions: 


New


   Hydrocodone/APAP 5-325 Tab^^^ [Norco 5-325 Tablet^^^] 1 tab PO Q6HPRN PRN #

20 tablet MDD 6


     PRN Reason: Pain





Continue


   Gabapentin 300 mg PO BID


   Escitalopram Oxalate 10 mg** [Lexapro 10 MG**] 20 mg PO DAILY


   Ferrous Sulfate 325 mg PO BID


   Budesonide/Formoterol Fumarate [Symbicort 160-4.5 Mcg Inhaler] 6 gm IH BID


   Potassium Chloride 10 Meq Tab* [Klor Con 10 MEQ***] 20 meq PO BID


   Furosemide [Lasix] 40 mg PO BID


   Carvedilol 12.5 mg*** [Coreg 12.5 mg***] 12.5 mg PO BID


   Hydralazine HCl 10 mg PO BID


   Trazodone HCl 50 mg*** [Desyrel 50 mg***] 150 mg PO HS


   Alprazolam 1 mg*** [Xanax 1 mg***] 0.5 mg PO TID


   Polyethylene Glycol 3350 17 gm [Miralax Powder 17GM PACKET***] 17 gm PO DAILY


   Simvastatin 10 mg** [Zocor 10MG**] 10 mg PO QPM


   Famotidine [Pepcid] 40 mg PO DAILY


Follow up with: 


MARCELINO BELLA [Primary Care Provider] - 01/10/20 10:00 am

## 2020-11-16 NOTE — PCM.DS
After verifying lead stability fluoroscopically and confirming hemostasis, the pocket was closed by  using 2 layers of 2-vicryl and 3-0 vicrylfor the subcutaneous layer and a single layer of  for the subcuticular layer. Dermabond applied to complete closure   Discharge Summary


Date of Admission: 


01/20/17 21:19





Date of Discharge: 


01/25/17





Admitting Physician: 


SAKSHI NIETO





Primary Care Provider: 


DOMINICK WONG








Allergies


Allergies





No Known Drug Allergies Allergy (Verified 10/06/16 09:43)


 











Hospital Summary





- Hospital Course


Hospital Course: 





Ms. Arzola presented with acute COPD exacerbation with pneumonia. Her extreme 

obesity limits her evaluations and therapies. SHe had previously been on home 

oxygen. She refuses evaluation for cpap. She follows with pulmonology. She was 

treated with iv ceftriaxone and azithromycin and iv steroids weaned to po and 

tolerated this well with nebs. Her symptoms were improving. She initially had 

CT with no PE seen. She had echo but her obesity limited the windows available 

for assessment and it was felt it was at 50% for her LVEF. She was able to wean 

her O2 down but was qualifying for home O2 with pulmonary follow up and 

tapering dose of prednisone. She had hyperglycemia secondary to her steroids 

and was covered with insulin this improved with tapering insulin and A1c was at 

6.8% and she will f/u with outpatient for discussion of further treatment of 

this. 





- Vitals & Intake/Output


Vital Signs: 





 Vital Signs











Temperature  97.7 F   01/25/17 08:00


 


Pulse Rate  77   01/25/17 08:00


 


Respiratory Rate  22   01/25/17 08:00


 


Blood Pressure  141/63   01/25/17 08:00


 


O2 Sat by Pulse Oximetry  94 L  01/25/17 08:00








 Oxygen-Last Documented











O2 Percentage                  4 Liters = 36%














Intake & Output: 





 Intake & Output











 01/22/17 01/23/17 01/24/17 01/25/17





 11:59 11:59 11:59 11:59


 


Intake Total 1980 1860 1500 2080


 


Output Total 200   


 


Balance 1780 1860 1500 2080


 


Weight  117.843 kg  














- Lab


Result Diagrams: 


 01/24/17 05:40





 01/24/17 05:40


Lab Results-Last 24 Hrs: 





 Accuchecks











Date                           01/24/17


 


Date                           01/24/17


 


Time                           16:30


 


Time                           11:30


 


Accucheck Value:               180


 


Accucheck Value:               270


 


Accucheck Value:               176














Micro Results-Entire Visit: 





 Accuchecks











Date                           01/24/17


 


Date                           01/24/17


 


Time                           16:30


 


Time                           11:30


 


Accucheck Value:               180


 


Accucheck Value:               270


 


Accucheck Value:               176

















- Radiology Exams


Ordered Rad Exams-Entire Visit: 





 Radiology Procedures











 Category Date Time Status


 


 CHEST 1 VIEW (PORTABLE) Routine Exams  01/23/17 08:12 Completed


 


 ECHO W/2D AND DOPPLER [US] Routine Exams  01/23/17 08:09 Completed








 Impressions





Echocardiogram Ultrasound  01/23/17 08:09


IMPRESSION: 





THIS IS A FAIRLY LIMITED STUDY WHICH PRECLUDES ADEQUATE ASSESSMENT OF THE 

INTRACARDIAC ANATOMY AND PHYSIOLOGY. 














- Procedures and Test


Procedures and Tests throughout Hospitalization: 





 Therapy Orders & Screens





01/20/17 22:18


Oxygen NASAL CANNULA 2 lpm 


   Comment: 


   Diagnosis: Shortness of Breath





01/20/17 22:19


Respiratory MDI Q12H 


   Comment: 


   Diagnosis: Shortness of Breath





01/20/17 22:20


neb [Respiratory Nebulizer] Q6H 


   Comment: 


   Diagnosis: Shortness of Breath





01/20/17 22:57


RT Screen per Nursing Assess ONCE 


   Comment: Protocol Order


   Physician Instructions: Greater than 3 points order RT Admission Screen


   Reason For Exam: Triggered on Admission


   Diagnosis: Shortness of Breath


   Diagnosis: Shortness of Breath


   Pneumonia: Yes


   Home O2: No


   Asthma: No


   CHF: No


   Home CPAP/BIPAP: No


   Home Nebs/MDI: Yes


   Total Points: 8





01/25/17 07:59


Qualify for Home Oxygen TODAY 


   Comment: 


   Diagnosis: PNEUMONIA














Discharge Exam


General Appearance: no apparent distress, obese


Neurologic Exam: alert, oriented x 3, cooperative


Skin Exam: warm, dry


Eye Exam: No scleral icterus


Ears, Nose, Throat Exam: dry mucous membranes


Neck Exam: normal inspection, non-tender, supple


Respiratory Exam: normal breath sounds, lungs clear, other (obesity limits exam)


Cardiovascular Exam: regular rate/rhythm, other (obesity limits exam)


Gastrointestinal/Abdomen Exam: soft, normal bowel sounds, No tenderness


Extremity Exam: normal inspection, No cas's sign, No pedal edema


Back Exam: normal inspection





Final Diagnosis/Problem List





- Final Discharge Diagnosis/Problem


(1) Pneumonia


Status: Acute   





(2) Acute on chronic respiratory failure with hypoxemia


Status: Acute   





(3) Acute exacerbation of chronic obstructive airways disease


Status: Acute   





(4) Hypertension


Status: Chronic   





(5) Anxiety


Status: Chronic   





(6) Anemia


Status: Acute   





(7) Morbid obesity


Status: Chronic   





(8) Pleural effusion


Status: Acute   





- Discharge


Discharge Date: 01/25/17


Disposition: Home, Self-Care


Condition: Fair


Prescriptions: 


New


   Prednisone 10 mg*** [Deltasone 10 mg***] 10 mg PO DAILY #27 tablet


   Potassium Chloride 10 Meq Tab* [Klor Con 10 MEQ***] 20 meq PO DAILY #0 tab


   Furosemide 40 mg*** [Lasix 40 MG***] 40 mg PO DAILY #0 tablet


   Azithromycin 250 mg*** [Zithromax 250 MG TABLET***] 250 mg PO DAILY #2 tablet





Continue


   Gabapentin 300 mg PO BID


   Escitalopram Oxalate 10 mg** [Lexapro 10 MG**] 10 mg PO DAILY


   Simethicone 80 mg*** [Mylicon 80MG***] 80 mg PO QID


   Alprazolam 1 mg*** [Xanax 1 mg***] 1 mg PO TID #90 tablet


   Lisinopril [Zestril] 40 mg PO DAILY


   Ferrous Sulfate 325 mg PO BID


   Amlodipine Besylate 10 mg [Norvasc 10 MG] 10 mg PO DAILY


   Budesonide/Formoterol Fumarate [Symbicort 160-4.5 Mcg Inhaler] 6 gm IH BID


Instructions:  Chronic Obstructive Pulmonary Disease, Pneumonia -- Adult, 

Oxygen Therapy -- Adult, Perform Oxygen Therapy via Cannula


Follow up with: 


ESTELITA NEAL [ACTIVE STAFF] - 02/01/17 3:30 pm ([place] office )


DOMINICK WONG [Primary Care Provider] - 02/02/17 2:30 pm


Forms:  Discharge Instructions, Patient Portal Information

## 2022-04-11 NOTE — PCM.NOTE
Anesthesia Evaluation     NPO Solid Status: > 6 hours  NPO Liquid Status: > 6 hours           Airway   Mallampati: III  TM distance: >3 FB  Dental      Pulmonary - negative pulmonary ROS and normal exam   Cardiovascular - normal exam  Exercise tolerance: good (4-7 METS)    (+) hypertension, CAD, hyperlipidemia,       Neuro/Psych  GI/Hepatic/Renal/Endo    (+)   diabetes mellitus,     Musculoskeletal     Abdominal    Substance History      OB/GYN          Other                        Anesthesia Plan    ASA 3     general with block     intravenous induction     Anesthetic plan, all risks, benefits, and alternatives have been provided, discussed and informed consent has been obtained with: patient.        CODE STATUS:        Date and Time: 01/24/17 0742





Subjective Assessment: 





still short of breath but slowly improving no new symptoms no swelling no pain





Objective Exam


General Appearance: obese


Neurologic Exam: alert, oriented x 3, cooperative


Skin Exam: warm, dry


Eye Exam: No scleral icterus


Ears, Nose, Throat Exam: moist mucous membranes


Neck Exam: non-tender, supple


Respiratory Exam: crackles/rales (bibasilar with mild rhonchi exam is limited 

by body habitus/extreme obesity)


Cardiovascular Exam: regular rate/rhythm (distant heart sounds limited by 

obesity)


Gastrointestinal/Abdomen Exam: soft, normal bowel sounds, No tenderness


Extremity Exam: No calf tenderness, No pedal edema





OBJECTIVE DATA


Vital Signs: 


 Vital Signs - 24 hr











  Temp Pulse Resp BP Pulse Ox


 


 01/24/17 07:26  98.3 F  74  18  135/60  95


 


 01/24/17 07:00   74  20   97


 


 01/24/17 04:00  98.3 F  92 H  19  113/54  92 L


 


 01/24/17 00:38   82  19   95


 


 01/24/17 00:00  98.1 F  85  21  126/62  96


 


 01/23/17 20:00  98.9 F  83  21  127/56  94 L


 


 01/23/17 18:00   87  20   95


 


 01/23/17 16:00    20  


 


 01/23/17 15:55  97.7 F  82  20  118/52  93 L


 


 01/23/17 12:00   74  18   98


 


 01/23/17 11:18  97.7 F  87  21  143/65  96


 


 01/23/17 08:42   75   132/76 


 


 01/23/17 08:00    19  








 Oxygen-Last 24 hours











O2 Percentage                  3 Liters = 32%


 


O2 Percentage                  4 Liters = 36%


 


O2 Percentage                  4 Liters = 36%


 


O2 Percentage                  4 Liters = 36%


 


O2 Percentage                  5 Liters = 40%











 Pain Assessment - Last Documented











Pain Intensity                 5


 


Pain Scale Used                0-10 Pain Scale











Intake and Output: 


 Intake & Output











 01/21/17 01/22/17 01/23/17 01/24/17





 11:59 11:59 11:59 11:59


 


Intake Total 1785 1980 1860 1500


 


Output Total  200  


 


Balance 1785 1780 1860 1500


 


Weight 117.843 kg  117.843 kg 











Lab Results: 


 Accuchecks











Date                           01/23/17


 


Date                           01/23/17


 


Date                           01/23/17


 


Date                           01/23/17


 


Time                           16:30


 


Time                           11:30


 


Time                           11:22


 


Accucheck Value:               247


 


Accucheck Value:               234


 


Accucheck Value:               328


 


Accucheck Value:               328











 Lab Results-Last 24 Hours











  01/23/17 01/24/17 01/24/17 Range/Units





  08:19 05:40 05:40 


 


WBC    13.0 H  (4.0-10.5)  K/mm3


 


RBC    3.63 L  (4.1-5.4)  M/mm3


 


Hgb    10.7 L  (12.0-16.0)  gm/dl


 


Hct    36.2  (35-47)  %


 


MCV    99.7  ()  fl


 


MCH    29.4  (26-32)  pg


 


MCHC    29.6 L  (32-36)  g/dl


 


RDW    14.6 H  (11.5-14.0)  %


 


Plt Count    342  (150-450)  K/mm3


 


MPV    9.7 H  (6-9.5)  fl


 


Sodium   142   (136-145)  mEq/L


 


Potassium   3.7   (3.5-5.1)  mEq/L


 


Chloride   99   ()  mEq/L


 


Carbon Dioxide   37.9 H   (21-32)  mEq/L


 


Anion Gap   8.5   (5-15)  MEQ/L


 


BUN   19   (9-20)  mg/dL


 


Creatinine   1.08   (0.55-1.30)  mg/dl


 


Estimated GFR   55   ML/MIN


 


Glucose   210 H   ()  MG/DL


 


Hemoglobin A1c  6.8 H    (4.5-6.2)  


 


Calcium   8.4 L   (8.5-10.1)  mg/dL











Radiology Exams: 


 Radiology Procedures











 Category Date Time Status


 


 CHEST 1 VIEW (PORTABLE) Routine Exams  01/23/17 08:12 Completed


 


 ECHO W/2D AND DOPPLER [US] Routine Exams  01/23/17 08:09 Taken














Assessment/Plan


(1) Pneumonia


Current Visit: Yes   Status: Acute   


Qualifiers: 


   Pneumonia type: due to other aerobic Gram-negative bacteria   Laterality: 

bilateral   Lung location: lower lobe of lung   Qualified Code(s): J15.6 - 

Pneumonia due to other aerobic Gram-negative bacteria   


Assessment & Plan: 


continue the ceftriaxone/azithromycin 


oxygen requirement is improving was on 5L yesterday weaned to 3L today


will give a dose of iv lasix with the effusions and rales. continue the po 

lasix per routine. 


wean the steroid 


sugar a little better on the lower dose


plan for home tomorrow with or without oxygen will try to see if the 

requirement can wean a little more today.


awaiting results of echocardiogram. 


Code(s): J18.9 - PNEUMONIA, UNSPECIFIED ORGANISM   





(2) Acute on chronic respiratory failure with hypoxemia


Current Visit: Yes   Status: Acute   Code(s): J96.21 - ACUTE AND CHRONIC 

RESPIRATORY FAILURE WITH HYPOXIA   





(3) Acute exacerbation of chronic obstructive airways disease


Current Visit: Yes   Status: Acute   Code(s): J44.1 - CHRONIC OBSTRUCTIVE 

PULMONARY DISEASE W (ACUTE) EXACERBATION   





(4) Hypertension


Current Visit: Yes   Status: Chronic   Code(s): I10 - ESSENTIAL (PRIMARY) 

HYPERTENSION   





(5) Anxiety


Current Visit: Yes   Status: Chronic   Code(s): F41.9 - ANXIETY DISORDER, 

UNSPECIFIED   





(6) Anemia


Current Visit: Yes   Status: Acute   Code(s): D64.9 - ANEMIA, UNSPECIFIED   





(7) Morbid obesity


Current Visit: Yes   Status: Chronic   Code(s): E66.01 - MORBID (SEVERE) 

OBESITY DUE TO EXCESS CALORIES   





(8) Pleural effusion


Current Visit: Yes   Status: Acute   Code(s): J90 - PLEURAL EFFUSION, NOT 

ELSEWHERE CLASSIFIED

## 2022-09-02 ENCOUNTER — HOSPITAL ENCOUNTER (EMERGENCY)
Dept: HOSPITAL 33 - ED | Age: 62
Discharge: HOME | End: 2022-09-02
Payer: COMMERCIAL

## 2022-09-02 VITALS — SYSTOLIC BLOOD PRESSURE: 137 MMHG | DIASTOLIC BLOOD PRESSURE: 63 MMHG | OXYGEN SATURATION: 97 % | HEART RATE: 116 BPM

## 2022-09-02 DIAGNOSIS — I10: ICD-10-CM

## 2022-09-02 DIAGNOSIS — Z79.899: ICD-10-CM

## 2022-09-02 DIAGNOSIS — Z79.891: ICD-10-CM

## 2022-09-02 DIAGNOSIS — Z28.310: ICD-10-CM
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
## 2022-09-02 DIAGNOSIS — J44.9: ICD-10-CM

## 2022-09-02 DIAGNOSIS — M79.672: ICD-10-CM

## 2022-09-02 DIAGNOSIS — S92.352A: Primary | ICD-10-CM

## 2022-09-02 PROCEDURE — 29515 APPLICATION SHORT LEG SPLINT: CPT

## 2022-09-02 PROCEDURE — 99283 EMERGENCY DEPT VISIT LOW MDM: CPT

## 2022-09-02 PROCEDURE — 73610 X-RAY EXAM OF ANKLE: CPT

## 2022-09-02 PROCEDURE — 73630 X-RAY EXAM OF FOOT: CPT

## 2022-09-02 NOTE — XRAY
Indication: Pain and swelling following fall.



Comparison: None



3 nonweightbearing views left foot demonstrates nondisplaced cortical fracture

base 5th metatarsal with soft tissue swelling.  Incidental osteopenia.

Remaining foot unremarkable.

## 2022-09-02 NOTE — ERPHSYRPT
- History of Present Illness


Time Seen by Provider: 09/02/22 16:41


Source: patient


Exam Limitations: no limitations


Patient Subjective Stated Complaint: Left foot injury


Triage Nursing Assessment: Patient brought back to ED per w/c and transferred to

bed with assist of 1. Patient A+O x3. Patient's skin pink, warm and dry. Patient

complains of left foot/ankle pain after falling today on uneven pavement. 

Patient complains of left foot pain 10/10. Left foot noted to be bruised and 

swollen.


Physician History: 





62 years old female presented in the ER after she tripped and bent her left 

ankle/foot.  She is complaining of severe sharp shooting pain with swelling on 

dorsum of foot.  Minimal  movement produced severe pain and no significant 

relieving factors.  also have some abrasion on the right elbow and knee but no p

ain.  Up-to-date with tetanus.  Did not hit her head or loss of consciousness.


Method of Injury: fell, twisted


Occurred: this afternoon


Quality: sharpness


Severity of Pain-Max: severe


Severity of Pain-Current: severe


Lower Extremities Pain: foot: left, ankle: left


Modifying Factors: Improves With: immobilization.  Worsens With: movement


Associated Symptoms: unable to bear weight


Allergies/Adverse Reactions: 








No Known Drug Allergies Allergy (Verified 09/02/22 17:20)


   





Home Medications: 








Escitalopram Oxalate** [Lexapro**] 20 mg PO DAILY 12/17/15 [History]


Gabapentin 300 mg PO BID 12/17/15 [History]


Ferrous Sulfate 325 mg PO BID 07/13/16 [History]


Budesonide/Formoterol Fumarate [Symbicort 160-4.5 Mcg Inhaler] 6 gm IH BID 

01/21/17 [History]


Potassium Chloride Tab* [Klor Con] 20 meq PO BID 07/21/18 [History]


ALPRAZolam 1 MG*** [Xanax 1 mg***] 0.5 mg PO TID 10/08/19 [History]


Carvedilol 12.5 mg*** [Coreg 12.5 mg***] 12.5 mg PO BID 10/08/19 [History]


Furosemide [Lasix] 40 mg PO BID 10/08/19 [History]


Hydralazine HCl 10 mg PO BID 10/08/19 [History]


Trazodone HCl 50 mg*** [Desyrel 50 mg***] 150 mg PO HS 10/08/19 [History]


Polyethylene Glycol 3350 17 gm [Miralax Powder 17GM PACKET***] 17 gm PO DAILY 

11/19/19 [History]


Simvastatin 10 mg** [Zocor 10MG**] 10 mg PO QPM 11/19/19 [History]


Famotidine [Pepcid] 40 mg PO DAILY 11/24/19 [History]





Hx Tetanus, Diphtheria Vaccination/Date Given: Yes


Hx Influenza Vaccination/Date Given: No


Hx Pneumococcal Vaccination/Date Given: Yes


Immunizations Up to Date: Yes





Travel Risk





- International Travel


Have you traveled outside of the country in past 3 weeks: No





- Coronavirus Screening


Are you exhibiting any of the following symptoms?: No


Close contact with a COVID-19 positive Pt in past 14-21 Days: No





- Vaccine Status


Have you recieved a Covid-19 vaccination: No





- Review of Systems


Constitutional: No Symptoms


Eyes: No Symptoms


Ears, Nose, & Throat: No Symptoms


Respiratory: No Symptoms


Cardiac: No Symptoms


Abdominal/Gastrointestinal: No Symptoms


Genitourinary Symptoms: No Symptoms


Musculoskeletal: Fall, Injury, Joint Pain, Joint Swelling


Skin: No Symptoms


Neurological: No Symptoms


Endocrine: No Symptoms


Hematologic/Lymphatic: No Symptoms





- Past Medical History


Pertinent Past Medical History: Yes


Neurological History: Seizures


ENT History: No Pertinent History


Cardiac History: Hypertension


Respiratory History: COPD, Pneumonia


Endocrine Medical History: No Pertinent History


Musculoskeletal History: Osteoarthritis


GI Medical History: Gallbladder Disease, Hernia, Other


 History: Renal Disease


Psycho-Social History: Anxiety, Depression


Female Reproductive Disorders: No Pertinent History


Other Medical History: PERF BOWEL 4/2014. pt states she had a seizure because 

she ran out of xanax, pt states she does not remember it, she was [facility] 

[facility].  Pt states that her kidneys leak protein.





- Past Surgical History


Past Surgical History: Yes


Neuro Surgical History: No Pertinent History


Cardiac: No Pertinent History


Respiratory: No Pertinent History


Gastrointestinal: Cholecystectomy, Colon Resection


Genitourinary: No Pertinent History


Musculoskeletal: No Pertinent History


Female Surgical History: No Pertinent History


Other Surgical History: colon resection in 2014





- Social History


Smoking Status: Former smoker


How long have you smoked: 35


Exposure to second hand smoke: No


Drug Use: none


Patient Lives Alone: No





- Nursing Vital Signs


Nursing Vital Signs: 





                               Initial Vital Signs











Temperature  97.8 F   09/02/22 17:20


 


Pulse Rate  116 H  09/02/22 17:20


 


Respiratory Rate  18   09/02/22 17:20


 


Blood Pressure  137/63   09/02/22 17:20


 


O2 Sat by Pulse Oximetry  97   09/02/22 17:20








                                   Pain Scale











Pain Intensity                 10

















- Physical Exam


General Appearance: no apparent distress, alert


Eyes, Ears, Nose, Throat Exam: normal ENT inspection


Neck Exam: normal inspection, full range of motion


Cardiovascular/Respiratory Exam: chest non-tender, normal breath sounds, regular

rate/rhythm


Back Exam: normal inspection, normal range of motion


Hips Exam: bilateral: non-tender, normal inspection, normal range of motion


Legs Exam: bilateral leg: non-tender, normal inspection, normal range of motion


Knees Exam: right knee: soft tissue tenderness (Abrasion)


Ankle Exam: left ankle: pain, soft tissue tenderness, swelling


Foot Exam: left foot: bone tenderness, pain, soft tissue tenderness, swelling 

(Dorsum of foot)


Neuro/Tendon Exam: normal sensation, normal motor functions, normal tendon 

functions


Mental Status Exam: alert, oriented x 3, cooperative


Skin Exam: normal color


**SpO2 Interpretation**: normal


SpO2: 97


O2 Delivery: Room Air


Ordered Tests: 





                               Active Orders 24 hr











 Category Date Time Status


 


 ANKLE (3 VIEWS) Stat Exams  09/02/22 17:10 Taken


 


 FOOT (MINIMUM 3 VIEWS) Stat Exams  09/02/22 17:10 Taken








Medication Summary














Discontinued Medications














Generic Name Dose Route Start Last Admin





  Trade Name Zacharyq  PRN Reason Stop Dose Admin


 


Oxycodone/Acetaminophen  1 tab  09/02/22 17:14  09/02/22 17:28





  Oxycodone Hcl/Apap 5 Mg/325 Mg Tablet  PO  09/02/22 17:15  1 tab





  STAT STA   Administration


 


Oxycodone/Acetaminophen  Confirm  09/02/22 17:26 





  Oxycodone Hcl/Apap 5 Mg/325 Mg Tablet  Administered  09/02/22 17:27 





  Dose  





  1 tab  





  .ROUTE  





  .STK-MED ONE  














- Progress


Progress: improved, pain not gone completely


Progress Note: 





09/02/22 18:04


She is given Percocet for symptomatic relief, on reevaluation feeling better.  

Has fracture fifth metatarsal base.  Placed in posterior splint Ortho-Glass by 

RN.  Nonweightbearing and outpatient follow-up with podiatry for reevaluation.


Counseled pt/family regarding: diagnosis, need for follow-up, rad results





- Departure


Departure Disposition: Home


Clinical Impression: 


 Foot fracture, left, Fall





Condition: Stable


Critical Care Time: No


Referrals: 


MARCELINO BELLA [Primary Care Provider] - Follow Up with PCP/3 days


YONY ARIAS DPM [ACTIVE STAFF] - Follow up/PCP as directed (In 3 days 

for reevaluation)


Instructions:  Foot Fracture (DC), Foot Sprain (DC)


Additional Instructions: 


Take pain medications as needed.  Nonweightbearing.  Follow-up with podiatry for

reevaluation.  Intermittent ice application.  Return to ER for any worsening.


Prescriptions: 


Hydrocodone/Acetaminophen [Hydrocodone-Acetamin 5-325 mg***] 1 tab PO Q6HPRN PRN

3 Days #12 tablet MDD 4


 PRN Reason: Pain

## 2022-09-02 NOTE — XRAY
Indication: Pain and swelling following fall.



Comparison: None



3 view left ankle demonstrates mild osteopenia.  No other bony, articular, or

soft tissue abnormalities.

## 2025-02-10 ENCOUNTER — HOSPITAL ENCOUNTER (EMERGENCY)
Dept: HOSPITAL 33 - ED | Age: 65
Discharge: HOME | End: 2025-02-10
Payer: MEDICARE

## 2025-02-10 VITALS — SYSTOLIC BLOOD PRESSURE: 158 MMHG | RESPIRATION RATE: 18 BRPM | DIASTOLIC BLOOD PRESSURE: 61 MMHG

## 2025-02-10 VITALS — HEART RATE: 71 BPM | OXYGEN SATURATION: 100 %

## 2025-02-10 VITALS — TEMPERATURE: 98.3 F

## 2025-02-10 DIAGNOSIS — E78.5: ICD-10-CM

## 2025-02-10 DIAGNOSIS — Z72.0: ICD-10-CM

## 2025-02-10 DIAGNOSIS — Z79.899: ICD-10-CM

## 2025-02-10 DIAGNOSIS — I12.9: ICD-10-CM

## 2025-02-10 DIAGNOSIS — R05.9: ICD-10-CM

## 2025-02-10 DIAGNOSIS — Z99.81: ICD-10-CM

## 2025-02-10 DIAGNOSIS — R91.8: Primary | ICD-10-CM

## 2025-02-10 DIAGNOSIS — N18.9: ICD-10-CM

## 2025-02-10 DIAGNOSIS — R06.02: ICD-10-CM

## 2025-02-10 LAB
ALBUMIN SERPL-MCNC: 3.9 G/DL (ref 3.5–5)
ALP SERPL-CCNC: 103 U/L (ref 38–126)
ALT SERPL-CCNC: 23 U/L (ref 0–35)
ANION GAP SERPL CALC-SCNC: 10 MEQ/L (ref 5–15)
AST SERPL QL: 28 U/L (ref 14–36)
BASOPHILS # BLD AUTO: 0.03 X10^3/UL (ref 0.01–0.08)
BASOPHILS NFR BLD AUTO: 0.3 % (ref 0.1–1.2)
BILIRUB BLD-MCNC: 0.3 MG/DL (ref 0.2–1.3)
BUN SERPL-MCNC: 16 MG/DL (ref 7–17)
CALCIUM SPEC-MCNC: 9 MG/DL (ref 8.4–10.2)
CHLORIDE SERPL-SCNC: 86 MMOL/L (ref 98–107)
CO2 SERPL-SCNC: 43 MMOL/L (ref 22–30)
CREAT SERPL-MCNC: 0.99 MG/DL (ref 0.52–1.04)
EOSINOPHIL # BLD AUTO: 0.5 X10^3/UL (ref 0.04–0.36)
FLUAV AG NPH QL IA: NEGATIVE
FLUBV AG NPH QL IA: NEGATIVE
GFR SERPLBLD BASED ON 1.73 SQ M-ARVRAT: 62.9 ML/MIN
GLUCOSE SERPL-MCNC: 109 MG/DL (ref 74–106)
HCT VFR BLD AUTO: 39.6 % (ref 34.1–44.9)
HGB BLD-MCNC: 12.5 G/DL (ref 11.2–15.7)
IMM GRANULOCYTES # BLD: 0.04 X10^3U/L (ref 0–0.03)
IMM GRANULOCYTES NFR BLD: 0.4 % (ref 0–0.43)
LYMPHOCYTES # SPEC AUTO: 0.74 X10^3/UL (ref 1.18–3.74)
MAGNESIUM SERPL-MCNC: 1.7 MG/DL (ref 1.6–2.3)
MCH RBC QN AUTO: 28.6 PG (ref 25.6–32.2)
MCHC RBC AUTO-ENTMCNC: 31.6 G/DL (ref 32.2–35.5)
MONOCYTES # BLD AUTO: 0.55 X10^3/UL (ref 0.24–0.86)
NRBC # BLD AUTO: 0 X10^3U/L (ref 0–0.01)
NRBC BLD AUTO-RTO: 0 % (ref 0–0.2)
PLATELET # BLD AUTO: 344 X10^3/UL (ref 182–369)
POTASSIUM SERPLBLD-SCNC: 4 MMOL/L (ref 3.5–5.1)
PROT SERPL-MCNC: 6.6 G/DL (ref 6.3–8.2)
RBC # BLD AUTO: 4.37 X10^6/UL (ref 3.93–5.22)
RSV AG SPEC QL IA: NEGATIVE
SARS-COV-2 AG RESP QL IA.RAPID: NEGATIVE
SODIUM SERPL-SCNC: 135 MMOL/L (ref 135–145)
WBC # BLD AUTO: 9.5 X10^3/UL (ref 3.98–10.04)

## 2025-02-10 PROCEDURE — 84484 ASSAY OF TROPONIN QUANT: CPT

## 2025-02-10 PROCEDURE — 99284 EMERGENCY DEPT VISIT MOD MDM: CPT

## 2025-02-10 PROCEDURE — 93005 ELECTROCARDIOGRAM TRACING: CPT

## 2025-02-10 PROCEDURE — 96374 THER/PROPH/DIAG INJ IV PUSH: CPT

## 2025-02-10 PROCEDURE — 93041 RHYTHM ECG TRACING: CPT

## 2025-02-10 PROCEDURE — 87040 BLOOD CULTURE FOR BACTERIA: CPT

## 2025-02-10 PROCEDURE — 99285 EMERGENCY DEPT VISIT HI MDM: CPT

## 2025-02-10 PROCEDURE — 83605 ASSAY OF LACTIC ACID: CPT

## 2025-02-10 PROCEDURE — 85025 COMPLETE CBC W/AUTO DIFF WBC: CPT

## 2025-02-10 PROCEDURE — 94760 N-INVAS EAR/PLS OXIMETRY 1: CPT

## 2025-02-10 PROCEDURE — 94640 AIRWAY INHALATION TREATMENT: CPT

## 2025-02-10 PROCEDURE — 80053 COMPREHEN METABOLIC PANEL: CPT

## 2025-02-10 PROCEDURE — 36415 COLL VENOUS BLD VENIPUNCTURE: CPT

## 2025-02-10 PROCEDURE — 96375 TX/PRO/DX INJ NEW DRUG ADDON: CPT

## 2025-02-10 PROCEDURE — 71045 X-RAY EXAM CHEST 1 VIEW: CPT

## 2025-02-10 PROCEDURE — 0241U: CPT

## 2025-02-10 PROCEDURE — 83880 ASSAY OF NATRIURETIC PEPTIDE: CPT

## 2025-02-10 PROCEDURE — 83735 ASSAY OF MAGNESIUM: CPT

## 2025-02-10 RX ADMIN — ACETAMINOPHEN ONE MG: 325 TABLET ORAL at 21:28

## 2025-02-10 RX ADMIN — CEFTRIAXONE SODIUM ONE MLS/HR: 1 INJECTION, POWDER, FOR SOLUTION INTRAMUSCULAR; INTRAVENOUS at 21:46

## 2025-02-10 RX ADMIN — IPRATROPIUM BROMIDE AND ALBUTEROL SULFATE ONE ML: .5; 3 SOLUTION RESPIRATORY (INHALATION) at 20:37

## 2025-02-10 RX ADMIN — WATER ONE MG: 1 INJECTION INTRAMUSCULAR; INTRAVENOUS; SUBCUTANEOUS at 21:46

## 2025-02-10 NOTE — ERPHSYRPT
- History of Present Illness


Time Seen by Provider: 02/10/25 19:40


Source: patient, family


Exam Limitations: clinical condition


Physician History: 





This is a 64-year-old white female patient brought to the emergency department 

by family member by private vehicle.  Patient's primary care provider is Dr. Owens.  Patient's primary complaint is shortness of breath.  Patient also ran 

out of home oxygen and her tank.  Patient has a history of COPD and ordinarily 

wears 5 L of oxygen via nasal cannula.  Here in the emergency department 

tonight, her oxygen saturation levels 98% on 3 L of oxygen via nasal cannula.  

Patient has no chest pain.  Patient also has a productive cough of thick 

yellowish sputum.  Patient has not felt well over the last week.  Patient smokes

a half a pack of tobacco cigarettes a day.  Patient has a history of 

pseudoseizures, hypertension, osteoarthritis, anxiety/depression, hyperlipidemia

and chronic renal disease.


Timing/Duration: week(s) (1), worse


Possible Cause: occasional episodes


Modifying Factors: Improves With: coughing, oxygen, rest


Associated Symptoms: anxiety, cough, No chest pain/discomfort


Allergies/Adverse Reactions: 








No Known Drug Allergies Allergy (Verified 02/10/25 19:49)


   





Home Medications: 








Escitalopram Oxalate** [Lexapro**] 20 mg PO DAILY 12/17/15 [History]


Gabapentin 300 mg PO BID 12/17/15 [History]


Ferrous Sulfate 325 mg PO BID 07/13/16 [History]


Budesonide/Formoterol Fumarate [Symbicort 160-4.5 Mcg Inhaler] 6 gm IH BID 

01/21/17 [History]


Potassium Chloride Tab* [Klor Con] 20 meq PO BID 07/21/18 [History]


ALPRAZolam 1 MG*** [Xanax 1 mg***] 0.5 mg PO TID 10/08/19 [History]


Carvedilol 12.5 mg*** [Coreg 12.5 mg***] 12.5 mg PO BID 10/08/19 [History]


Furosemide [Lasix] 40 mg PO BID 10/08/19 [History]


Hydralazine HCl 10 mg PO BID 10/08/19 [History]


Trazodone HCl 50 mg*** [Desyrel 50 mg***] 150 mg PO HS 10/08/19 [History]


Polyethylene Glycol 3350 17 gm [Miralax Powder 17GM PACKET***] 17 gm PO DAILY 

11/19/19 [History]


Simvastatin 10 mg** [Zocor 10MG**] 10 mg PO QPM 11/19/19 [History]


Famotidine [Pepcid] 40 mg PO DAILY 11/24/19 [History]





Hx Tetanus, Diphtheria Vaccination/Date Given: Yes


Hx Influenza Vaccination/Date Given: No


Hx Pneumococcal Vaccination/Date Given: Yes





Travel Risk





- International Travel


Have you traveled outside of the country in past 3 weeks: No





- Emerging Infectious Disease


Are you exhibiting symptoms associated with any current EIDs: Yes


Symptoms: Cough: New Onset, Shortness of Breath





- Review of Systems


Constitutional: No Symptoms


Eyes: No Symptoms


Ears, Nose, & Throat: No Symptoms


Respiratory: Cough, Dyspnea


Cardiac: No Symptoms


Abdominal/Gastrointestinal: No Symptoms


Genitourinary Symptoms: No Symptoms


Musculoskeletal: No Symptoms


Skin: No Symptoms


Neurological: No Symptoms


Psychological: No Symptoms


Endocrine: No Symptoms


Hematologic/Lymphatic: No Symptoms


Immunological/Allergic: No Symptoms


All Other Systems: Reviewed and Negative





- Past Medical History


Pertinent Past Medical History: Yes


Neurological History: Seizures


ENT History: No Pertinent History


Cardiac History: Hypertension


Respiratory History: COPD, Pneumonia


Endocrine Medical History: No Pertinent History


Musculoskeletal History: Osteoarthritis


GI Medical History: Gallbladder Disease, Hernia, Other


 History: Renal Disease


Psycho-Social History: Anxiety, Depression


Female Reproductive Disorders: No Pertinent History


Other Medical History: PERF BOWEL 4/2014. pt states she had a seizure because 

she ran out of xanax, pt states she does not remember it, she was taken [facility] 

[facility].  Pt states that her kidneys leak protein.





- Past Surgical History


Past Surgical History: Yes


Neuro Surgical History: No Pertinent History


Cardiac: No Pertinent History


Respiratory: No Pertinent History


Gastrointestinal: Cholecystectomy, Colon Resection


Genitourinary: No Pertinent History


Musculoskeletal: No Pertinent History


Female Surgical History: No Pertinent History


Other Surgical History: colon resection in 2014





- Social History


Smoking Status: Former smoker


How long have you smoked: 35


Exposure to second hand smoke: No


Drug Use: none


Patient Lives Alone: No





- Nursing Vital Signs


Nursing Vital Signs: 


                               Initial Vital Signs











Temperature  98.3 F   02/10/25 19:34


 


Pulse Rate  62   02/10/25 19:34


 


Respiratory Rate  25 H  02/10/25 19:34


 


Blood Pressure  151/92   02/10/25 19:34


 


O2 Sat by Pulse Oximetry  98   02/10/25 19:34








                                   Pain Scale











Pain Intensity                 4

















- Physical Exam


General Appearance: no apparent distress, alert, anxiety


Eye Exam: PERRL/EOMI, eyes nml inspection


Ears, Nose, Throat Exam: hearing grossly normal, normal ENT inspection, normal 

pharynx


Neck Exam: normal inspection, non-tender, supple, full range of motion


Respiratory Exam: diminished breath sounds (Bilateral and diffuse), No chest 

tenderness, No respiratory distress, No accessory muscle use, No wheezing, No 

stridor


Cardiovascular/Chest Exam: normal heart sounds, regular rate/rhythm


Abdominal/Gastrointestinal Exam: soft, normal bowel sounds, No tenderness


Rectal Exam: not done


Extremity Exam: non-tender, normal range of motion, normal inspection


Neurologic Exam: alert, oriented x 3, cooperative, CNs II-XII nml as tested, nml

 cerebellar function, nml station & gait, sensation nml


Skin Exam: normal color, warm, dry


Lymphatic Exam: No adenopathy


**SpO2 Interpretation**: normal


O2 Delivery: Nasal Cannula (3 L of oxygen)





- Course


Nursing assessment & vital signs reviewed: Yes


EKG Interpreted by Me: RATE (85), Sinus Rhythm, NORMAL AXIS, NORMAL INTERVALS, 

NORMAL QRS, Other (QTc is 473.  No acute ischemia on today's twelve-lead EKG.)


Ordered Tests: 


                               Active Orders 24 hr











 Category Date Time Status


 


 Cardiac Monitor STAT Care  02/10/25 19:48 Active


 


 EKG-ER Only STAT Care  02/10/25 19:47 Active


 


 IV Insertion STAT Care  02/10/25 19:47 Active


 


 Pulse Oximetry (ED) STAT Care  02/10/25 19:47 Active


 


 CHEST 1 VIEW (PORTABLE) Stat Exams  02/10/25 19:47 Taken


 


 BLOOD CULTURE Stat Lab  02/10/25 20:08 Received


 


 CBC W DIFF Stat Lab  02/10/25 19:55 Completed


 


 CMP Stat Lab  02/10/25 19:55 Completed


 


 Lactic Acid Stat Lab  02/10/25 20:54 Completed


 


 MAGNESIUM Stat Lab  02/10/25 19:55 Completed


 


 NT PRO BNPII Stat Lab  02/10/25 20:00 Completed


 


 TROPONIN Q4H Lab  02/10/25 19:55 Completed


 


 TROPONIN Q4H Lab  02/10/25 22:00 Completed


 


 TROPONIN Q4H Lab  02/11/25 00:00 Ordered


 


 TROPONIN Q4H Lab  02/11/25 04:00 Ordered


 


 Respiratory Therapy Assessment DAILY RT  02/10/25 20:37 Active


 


 Respiratory Therapy Consult ONCE RT  02/10/25 20:24 Completed








Medication Summary














Discontinued Medications














Generic Name Dose Route Start Last Admin





  Trade Name Kelley  PRN Reason Stop Dose Admin


 


Acetaminophen  650 mg  02/10/25 21:23  02/10/25 21:28





  Acetaminophen 325 Mg Tablet  PO  02/10/25 21:24  650 mg





  STAT ONE   Administration


 


Acetaminophen  Confirm  02/10/25 21:27 





  Acetaminophen 325 Mg Tablet  Administered  02/10/25 21:28 





  Dose  





  650 mg  





  .ROUTE  





  .STK-MED ONE  


 


Albuterol/Ipratropium  3 ml  02/10/25 20:24  02/10/25 20:37





  Ipratropium/Albuterol Sulfate 3 Ml Ampul.Neb  IH  02/10/25 20:25  3 ml





  STAT ONE   Administration


 


Albuterol/Ipratropium  Confirm  02/10/25 20:32 





  Ipratropium/Albuterol Sulfate 3 Ml Ampul.Neb  Administered  02/10/25 20:33 





  Dose  





  3 ml  





  IH  





  .STK-MED ONE  


 


Methylprednisolone Sodium  0 mg  02/10/25 21:39  02/10/25 21:46





Succinate 125 mg/ Sterile  IV  02/10/25 21:40  125 mg





Water 2 ml  STAT ONE   Administration


 


Ceftriaxone Sodium  1 gm in 100 mls @ 200 mls/hr  02/10/25 21:36  02/10/25 22:18





  Rocephin 1 Gm / 100 Ml Nacl  IV  02/10/25 22:05  Infused





  STAT ONE   Infusion


 


Ceftriaxone Sodium  Confirm  02/10/25 21:43 





  Rocephin 1 Gm / 100 Ml Nacl  Administered  02/10/25 21:44 





  Dose  





  1 gm in 100 mls @ ud  





  IV  





  .STK-MED ONE  


 


Methylprednisolone Sodium Succinate  Confirm  02/10/25 21:43 





  Methylprednis Sod Succ 125 Mg/2 Ml Vial***  Administered  02/10/25 21:44 





  Dose  





  125 mg  





  .ROUTE  





  .STK-MED ONE  


 


Sterile Water  Confirm  02/10/25 21:43 





  Water For Injection,Sterile 10 Ml Vial  Administered  02/10/25 21:44 





  Dose  





  10 ml  





  IJ  





  .STK-MED ONE  











Lab/Rad Data: 


                           Laboratory Result Diagrams





                                 02/10/25 19:55 





                                 02/10/25 19:55 





                               Laboratory Results











  02/10/25 02/10/25 02/10/25 Range/Units





  22:00 20:54 20:10 


 


WBC     (3.98-10.04)  x10^3/uL


 


RBC     (3.93-5.22)  x10^6/uL


 


Hgb     (11.2-15.7)  g/dL


 


Hct     (34.1-44.9)  %


 


MCV     (79.4-94.8)  fL


 


MCH     (25.6-32.2)  pg


 


MCHC     (32.2-35.5)  g/dL


 


RDW     (11.7-14.4)  %


 


Plt Count     (182-369)  x10^3/uL


 


MPV     (9.4-12.3)  fL


 


Gran %     (34.0-71.1)  %


 


Immature Gran % (Auto)     (0.001-0.429)  %


 


Nucleat RBC Rel Count     (0.00-0.2)  %


 


Eos # (Auto)     (0.04-0.36)  x10^3/uL


 


Immature Gran # (Auto)     (0.001-0.031)  x10^3u/L


 


Absolute Lymphs (auto)     (1.18-3.74)  x10^3/uL


 


Absolute Monos (auto)     (0.24-0.86)  x10^3/uL


 


Absolute Nucleated RBC     (0.00-0.012)  x10^3u/L


 


Lymphocytes %     (19.3-51.7)  %


 


Monocytes %     (4.7-12.5)  %


 


Eosinophils %     (0.7-5.8)  %


 


Basophils %     (0.1-1.2)  %


 


Absolute Granulocytes     (1.56-6.13)  x10^3/uL


 


Basophils #     (0.01-0.08)  x10^3/uL


 


Sodium     (135-145)  mmol/L


 


Potassium     (3.5-5.1)  mmol/L


 


Chloride     ()  mmol/L


 


Carbon Dioxide     (22-30)  mmol/L


 


Anion Gap     (5-15)  MEQ/L


 


BUN     (7-17)  mg/dL


 


Creatinine     (0.52-1.04)  mg/dL


 


Estimated GFR     ML/MIN


 


Glucose     ()  mg/dL


 


Lactic Acid   1.0   (0.4-2.0)  


 


Calcium     (8.4-10.2)  mg/dL


 


Magnesium     (1.6-2.3)  mg/dL


 


Total Bilirubin     (0.2-1.3)  mg/dL


 


AST     (14-36)  U/L


 


ALT     (0-35)  U/L


 


Alkaline Phosphatase     ()  U/L


 


Troponin I  0.018    (0.000-0.033)  ng/mL


 


NT-Pro-B Natriuret Pep     (<300)  pg/mL


 


Serum Total Protein     (6.3-8.2)  g/dL


 


Albumin     (3.5-5.0)  g/dL


 


Influenza Type A Ag    NEGATIVE  (NEGATIVE)  


 


Influenza Type B Ag    NEGATIVE  (NEGATIVE)  


 


RSV (PCR)    NEGATIVE  (NEGATIVE)  


 


SARS-CoV-2 (PCR)    NEGATIVE  (NEGATIVE)  














  02/10/25 02/10/25 02/10/25 Range/Units





  20:00 19:55 19:55 


 


WBC     (3.98-10.04)  x10^3/uL


 


RBC     (3.93-5.22)  x10^6/uL


 


Hgb     (11.2-15.7)  g/dL


 


Hct     (34.1-44.9)  %


 


MCV     (79.4-94.8)  fL


 


MCH     (25.6-32.2)  pg


 


MCHC     (32.2-35.5)  g/dL


 


RDW     (11.7-14.4)  %


 


Plt Count     (182-369)  x10^3/uL


 


MPV     (9.4-12.3)  fL


 


Gran %     (34.0-71.1)  %


 


Immature Gran % (Auto)     (0.001-0.429)  %


 


Nucleat RBC Rel Count     (0.00-0.2)  %


 


Eos # (Auto)     (0.04-0.36)  x10^3/uL


 


Immature Gran # (Auto)     (0.001-0.031)  x10^3u/L


 


Absolute Lymphs (auto)     (1.18-3.74)  x10^3/uL


 


Absolute Monos (auto)     (0.24-0.86)  x10^3/uL


 


Absolute Nucleated RBC     (0.00-0.012)  x10^3u/L


 


Lymphocytes %     (19.3-51.7)  %


 


Monocytes %     (4.7-12.5)  %


 


Eosinophils %     (0.7-5.8)  %


 


Basophils %     (0.1-1.2)  %


 


Absolute Granulocytes     (1.56-6.13)  x10^3/uL


 


Basophils #     (0.01-0.08)  x10^3/uL


 


Sodium    135  (135-145)  mmol/L


 


Potassium    4.0  (3.5-5.1)  mmol/L


 


Chloride    86 L  ()  mmol/L


 


Carbon Dioxide    43 H  (22-30)  mmol/L


 


Anion Gap    10.0  (5-15)  MEQ/L


 


BUN    16  (7-17)  mg/dL


 


Creatinine    0.99  (0.52-1.04)  mg/dL


 


Estimated GFR    62.9  ML/MIN


 


Glucose    109 H  ()  mg/dL


 


Lactic Acid     (0.4-2.0)  


 


Calcium    9.0  (8.4-10.2)  mg/dL


 


Magnesium    1.7  (1.6-2.3)  mg/dL


 


Total Bilirubin    0.30  (0.2-1.3)  mg/dL


 


AST    28  (14-36)  U/L


 


ALT    23  (0-35)  U/L


 


Alkaline Phosphatase    103  ()  U/L


 


Troponin I   0.018   (0.000-0.033)  ng/mL


 


NT-Pro-B Natriuret Pep  697    (<300)  pg/mL


 


Serum Total Protein    6.6  (6.3-8.2)  g/dL


 


Albumin    3.9  (3.5-5.0)  g/dL


 


Influenza Type A Ag     (NEGATIVE)  


 


Influenza Type B Ag     (NEGATIVE)  


 


RSV (PCR)     (NEGATIVE)  


 


SARS-CoV-2 (PCR)     (NEGATIVE)  














  02/10/25 Range/Units





  19:55 


 


WBC  9.5  (3.98-10.04)  x10^3/uL


 


RBC  4.37  (3.93-5.22)  x10^6/uL


 


Hgb  12.5  (11.2-15.7)  g/dL


 


Hct  39.6  (34.1-44.9)  %


 


MCV  90.6  (79.4-94.8)  fL


 


MCH  28.6  (25.6-32.2)  pg


 


MCHC  31.6 L  (32.2-35.5)  g/dL


 


RDW  14.2  (11.7-14.4)  %


 


Plt Count  344  (182-369)  x10^3/uL


 


MPV  9.5  (9.4-12.3)  fL


 


Gran %  80.4 H  (34.0-71.1)  %


 


Immature Gran % (Auto)  0.4  (0.001-0.429)  %


 


Nucleat RBC Rel Count  0.0  (0.00-0.2)  %


 


Eos # (Auto)  0.50 H  (0.04-0.36)  x10^3/uL


 


Immature Gran # (Auto)  0.04 H  (0.001-0.031)  x10^3u/L


 


Absolute Lymphs (auto)  0.74 L  (1.18-3.74)  x10^3/uL


 


Absolute Monos (auto)  0.55  (0.24-0.86)  x10^3/uL


 


Absolute Nucleated RBC  0.00  (0.00-0.012)  x10^3u/L


 


Lymphocytes %  7.8 L  (19.3-51.7)  %


 


Monocytes %  5.8  (4.7-12.5)  %


 


Eosinophils %  5.3  (0.7-5.8)  %


 


Basophils %  0.3  (0.1-1.2)  %


 


Absolute Granulocytes  7.60 H  (1.56-6.13)  x10^3/uL


 


Basophils #  0.03  (0.01-0.08)  x10^3/uL


 


Sodium   (135-145)  mmol/L


 


Potassium   (3.5-5.1)  mmol/L


 


Chloride   ()  mmol/L


 


Carbon Dioxide   (22-30)  mmol/L


 


Anion Gap   (5-15)  MEQ/L


 


BUN   (7-17)  mg/dL


 


Creatinine   (0.52-1.04)  mg/dL


 


Estimated GFR   ML/MIN


 


Glucose   ()  mg/dL


 


Lactic Acid   (0.4-2.0)  


 


Calcium   (8.4-10.2)  mg/dL


 


Magnesium   (1.6-2.3)  mg/dL


 


Total Bilirubin   (0.2-1.3)  mg/dL


 


AST   (14-36)  U/L


 


ALT   (0-35)  U/L


 


Alkaline Phosphatase   ()  U/L


 


Troponin I   (0.000-0.033)  ng/mL


 


NT-Pro-B Natriuret Pep   (<300)  pg/mL


 


Serum Total Protein   (6.3-8.2)  g/dL


 


Albumin   (3.5-5.0)  g/dL


 


Influenza Type A Ag   (NEGATIVE)  


 


Influenza Type B Ag   (NEGATIVE)  


 


RSV (PCR)   (NEGATIVE)  


 


SARS-CoV-2 (PCR)   (NEGATIVE)  














- Progress


Progress: improved, re-examined


Air Movement: fair


Progress Note: 





02/10/25 20:15


My medical decision making and the assignment of at least moderate complexity to

 this patient's medical issue today is based on review of the patient's past 

medical history, review the patient's medication list, reviewed patient drug 

allergy list, history present illness and physical findings on examination.  The

 workup in this patient includes placement of a intravenous line, CBC, CMP, 

lactic acid level, BNP, troponin level, twelve-lead EKG, chest x-ray, viral 

swabs and respiratory therapy evaluation.





Differential diagnosis includes but is not limited to upper respiratory 

infection, pneumonia, viral illness, COPD exacerbation, CHF exacerbation, 

myocardial infarction, arrhythmias


02/10/25 21:37


I interpreted the patient's laboratory data results.  Based on the laboratory 

data results, there are no acute, emergent medical issues.





I interpreted the preliminary chest x-ray and compared to 2 prior portable chest

 x-rays (November 2019, November 2018).  Today's chest x-ray does not show 

cardiomegaly.  There is no obvious fluid when compared to the prior studies.  

There is a question of right base and left perihilar infiltrate versus 

atelectasis on today's chest x-ray.


02/10/25 22:35


I interpreted the patient's repeat, second, twelve-lead EKG that was performed 

on 2/10/2025 at 2212.  The heart rate is 84 bpm.  It is normal sinus rhythm.  

There is evidence of prolonged QT interval.  There is supraventricular bigeminy.

  There is no acute ischemia on today's twelve-lead EKG.  QTc is 507.





Patient is happy she is smiling and laughing and wants to go home.  She denies 

chest pain.  We are awaiting the repeat troponin level.  If this is normal she 

may be discharged home.


02/10/25 22:38


Repeat troponin level is unchanged and is normal.


Blood Culture(s) Obtained: Yes


Antibiotics given: Yes


Counseled pt/family regarding: lab results, diagnosis, rad results





Medical Desision Making





- Independent Historian


Additional History obtained from: Family





- Diagnostic Testing


Diagnostic test were ordered, analyzed, and reviewed by me: Yes


Radiological Interpretation: Interpreted by me, Teleradiologist Report





- Departure


Departure Disposition: Home


Clinical Impression: 


 Infiltrate of lung present on chest x-ray





Condition: Stable


Critical Care Time: No


Referrals: 


MARCELINO OWENS [Primary Care Provider] - Follow up/PCP as directed


Additional Instructions: 


Stop smoking.  Continue medications and oxygen as prescribed.  Take your 

antibiotics as prescribed.  Call your primary care provider tomorrow, 2/11/2025,

to make arranges for follow-up appointment for further evaluation management.


Prescriptions: 


Cefdinir 300 mg PO BID #14 cap

## 2025-02-11 NOTE — XRAY
Indication: Short of breath.



Comparison: November 23, 2019



Portable chest better inflated again with chronic blunting right costophrenic

angle.  Remaining heart and lungs unremarkable.  Bony thorax intact again with

osteopenia and mild degenerative changes.  No new/acute findings.

## 2025-02-25 ENCOUNTER — HOSPITAL ENCOUNTER (OUTPATIENT)
Dept: HOSPITAL 33 - ED | Age: 65
Setting detail: OBSERVATION
LOS: 2 days | Discharge: HOME | End: 2025-02-27
Attending: INTERNAL MEDICINE | Admitting: INTERNAL MEDICINE
Payer: MEDICARE

## 2025-02-25 DIAGNOSIS — F41.9: ICD-10-CM

## 2025-02-25 DIAGNOSIS — I10: ICD-10-CM

## 2025-02-25 DIAGNOSIS — Z79.899: ICD-10-CM

## 2025-02-25 DIAGNOSIS — Z99.81: ICD-10-CM

## 2025-02-25 DIAGNOSIS — K59.00: ICD-10-CM

## 2025-02-25 DIAGNOSIS — J44.1: Primary | ICD-10-CM

## 2025-02-25 DIAGNOSIS — E66.811: ICD-10-CM

## 2025-02-25 DIAGNOSIS — F17.200: ICD-10-CM

## 2025-02-25 DIAGNOSIS — E11.9: ICD-10-CM

## 2025-02-25 DIAGNOSIS — J96.21: ICD-10-CM

## 2025-02-25 DIAGNOSIS — G47.00: ICD-10-CM

## 2025-02-25 DIAGNOSIS — K46.9: ICD-10-CM

## 2025-02-25 DIAGNOSIS — E78.5: ICD-10-CM

## 2025-02-25 DIAGNOSIS — R91.8: ICD-10-CM

## 2025-02-25 DIAGNOSIS — G25.81: ICD-10-CM

## 2025-02-25 LAB
ALBUMIN SERPL-MCNC: 3.6 G/DL (ref 3.5–5)
ALP SERPL-CCNC: 73 U/L (ref 38–126)
ALT SERPL-CCNC: 28 U/L (ref 0–35)
ANION GAP SERPL CALC-SCNC: 9.8 MEQ/L (ref 5–15)
AST SERPL QL: 20 U/L (ref 14–36)
BASOPHILS # BLD AUTO: 0.02 X10^3/UL (ref 0.01–0.08)
BASOPHILS NFR BLD AUTO: 0.2 % (ref 0.1–1.2)
BILIRUB BLD-MCNC: 0.4 MG/DL (ref 0.2–1.3)
BUN SERPL-MCNC: 10 MG/DL (ref 7–17)
CALCIUM SPEC-MCNC: 8.7 MG/DL (ref 8.4–10.2)
CHLORIDE SERPL-SCNC: 95 MMOL/L (ref 98–107)
CO2 SERPL-SCNC: 39 MMOL/L (ref 22–30)
CREAT SERPL-MCNC: 0.65 MG/DL (ref 0.52–1.04)
EOSINOPHIL # BLD AUTO: 0.12 X10^3/UL (ref 0.04–0.36)
FLUAV AG NPH QL IA: NEGATIVE
FLUBV AG NPH QL IA: NEGATIVE
GFR SERPLBLD BASED ON 1.73 SQ M-ARVRAT: 97 ML/MIN
GLUCOSE SERPL-MCNC: 96 MG/DL (ref 74–106)
HCT VFR BLD AUTO: 33.7 % (ref 34.1–44.9)
HGB BLD-MCNC: 10.4 G/DL (ref 11.2–15.7)
IMM GRANULOCYTES # BLD: 0.04 X10^3U/L (ref 0–0.03)
IMM GRANULOCYTES NFR BLD: 0.4 % (ref 0–0.43)
LYMPHOCYTES # SPEC AUTO: 1.16 X10^3/UL (ref 1.18–3.74)
MCH RBC QN AUTO: 28.7 PG (ref 25.6–32.2)
MCHC RBC AUTO-ENTMCNC: 30.9 G/DL (ref 32.2–35.5)
MONOCYTES # BLD AUTO: 0.61 X10^3/UL (ref 0.24–0.86)
NRBC # BLD AUTO: 0 X10^3U/L (ref 0–0.01)
NRBC BLD AUTO-RTO: 0 % (ref 0–0.2)
PLATELET # BLD AUTO: 285 X10^3/UL (ref 182–369)
POTASSIUM SERPLBLD-SCNC: 4.4 MMOL/L (ref 3.5–5.1)
PROT SERPL-MCNC: 6 G/DL (ref 6.3–8.2)
RBC # BLD AUTO: 3.63 X10^6/UL (ref 3.93–5.22)
RSV AG SPEC QL IA: NEGATIVE
SARS-COV-2 AG RESP QL IA.RAPID: NEGATIVE
SODIUM SERPL-SCNC: 140 MMOL/L (ref 135–145)
WBC # BLD AUTO: 9.8 X10^3/UL (ref 3.98–10.04)

## 2025-02-25 PROCEDURE — 94760 N-INVAS EAR/PLS OXIMETRY 1: CPT

## 2025-02-25 PROCEDURE — 0241U: CPT

## 2025-02-25 PROCEDURE — 93306 TTE W/DOPPLER COMPLETE: CPT

## 2025-02-25 PROCEDURE — 82947 ASSAY GLUCOSE BLOOD QUANT: CPT

## 2025-02-25 PROCEDURE — 93005 ELECTROCARDIOGRAM TRACING: CPT

## 2025-02-25 PROCEDURE — 93268 ECG RECORD/REVIEW: CPT

## 2025-02-25 PROCEDURE — 96374 THER/PROPH/DIAG INJ IV PUSH: CPT

## 2025-02-25 PROCEDURE — 87040 BLOOD CULTURE FOR BACTERIA: CPT

## 2025-02-25 PROCEDURE — 93041 RHYTHM ECG TRACING: CPT

## 2025-02-25 PROCEDURE — 85027 COMPLETE CBC AUTOMATED: CPT

## 2025-02-25 PROCEDURE — 83880 ASSAY OF NATRIURETIC PEPTIDE: CPT

## 2025-02-25 PROCEDURE — 80048 BASIC METABOLIC PNL TOTAL CA: CPT

## 2025-02-25 PROCEDURE — 71045 X-RAY EXAM CHEST 1 VIEW: CPT

## 2025-02-25 PROCEDURE — 84484 ASSAY OF TROPONIN QUANT: CPT

## 2025-02-25 PROCEDURE — 36415 COLL VENOUS BLD VENIPUNCTURE: CPT

## 2025-02-25 PROCEDURE — 81001 URINALYSIS AUTO W/SCOPE: CPT

## 2025-02-25 PROCEDURE — 83735 ASSAY OF MAGNESIUM: CPT

## 2025-02-25 PROCEDURE — G0378 HOSPITAL OBSERVATION PER HR: HCPCS

## 2025-02-25 PROCEDURE — 94640 AIRWAY INHALATION TREATMENT: CPT

## 2025-02-25 PROCEDURE — 83036 HEMOGLOBIN GLYCOSYLATED A1C: CPT

## 2025-02-25 PROCEDURE — 80053 COMPREHEN METABOLIC PANEL: CPT

## 2025-02-25 PROCEDURE — 99285 EMERGENCY DEPT VISIT HI MDM: CPT

## 2025-02-25 PROCEDURE — 85025 COMPLETE CBC W/AUTO DIFF WBC: CPT

## 2025-02-25 PROCEDURE — 74176 CT ABD & PELVIS W/O CONTRAST: CPT

## 2025-02-25 PROCEDURE — 85379 FIBRIN DEGRADATION QUANT: CPT

## 2025-02-25 PROCEDURE — 84145 PROCALCITONIN (PCT): CPT

## 2025-02-25 RX ADMIN — WATER ONE MG: 1 INJECTION INTRAMUSCULAR; INTRAVENOUS; SUBCUTANEOUS at 21:48

## 2025-02-25 RX ADMIN — IPRATROPIUM BROMIDE AND ALBUTEROL SULFATE ONE ML: .5; 3 SOLUTION RESPIRATORY (INHALATION) at 22:00

## 2025-02-25 NOTE — ERPHSYRPT
- History of Present Illness


Time Seen by Provider: 02/25/25 21:55


Source: patient


Exam Limitations: no limitations


Patient Subjective Stated Complaint: short of breath, anxious and constipated


Triage Nursing Assessment: Pt brought back in wheelchair.  Pt c/o shortness of 

breath, is extremely anxious and c/o constipation.  Pt arrived on 2L n/c, O2 

sats 95%.  Lungs are diminished and tight thoughout ant and post/bilat.  Left 

posterior upper lobe had scattered wheezes noted on inspiration.  Pt continues 

to smoke about 1PPD per daughter.  Heart tones reg.  No edema noted.  Pt has a 

large abd hernia.  Pt's abd has active bs x4 quad, nontender.  LBM was 2 weeks 

ago per pt.


Physician History: 


64-year-old female history of COPD presents to our emergency department for 

evaluation of shortness of breath.  Patient is a current smoker.  Patient wears 

oxygen at home 2 to 5 L depending on activity level.  Patient's shortness of 

breath started yesterday progressively worse today.  No associated chest pain.  

No nausea vomiting or diaphoresis.  Patient added that she has been constipated 

for 2 weeks.  Patient states she has a known ventral wall hernia.  Patient 

concerned that the hernia is causing her constipation which is then causing her 

shortness of breath per patient.  Patient otherwise feels well.  Daughter at [facility].  They voiced no other complaints or concerns at this time.











Portions of this note were created with voice recognition technology.  There may

be grammatical, spelling, punctuation or sound alike errors








Timing/Duration: today


Activities at Onset: activity


Severity of Dyspnea-Max: moderate


Severity of Dyspnea-Current: mild


Possible Cause: occasional episodes


Modifying Factors: Improves With: activity


Associated Symptoms: cough


Allergies/Adverse Reactions: 








No Known Drug Allergies Allergy (Verified 02/25/25 21:21)


   





Home Medications: 








Escitalopram Oxalate** [Lexapro**] 20 mg PO DAILY 12/17/15 [History]


Gabapentin 300 mg PO BID 12/17/15 [History]


Budesonide/Formoterol Fumarate [Symbicort 160-4.5 Mcg Inhaler] 6 gm IH BID 

01/21/17 [History]


Potassium Chloride Tab* [Klor Con] 20 meq PO BID 07/21/18 [History]


Carvedilol 12.5 mg*** [Coreg 12.5 mg***] 12.5 mg PO BID 10/08/19 [History]


Furosemide [Lasix] 40 mg PO BID 10/08/19 [History]


Hydralazine HCl 10 mg PO TID 10/08/19 [History]


Trazodone HCl 50 mg*** [Desyrel 50 mg***] 150 mg PO HS 10/08/19 [History]


Polyethylene Glycol 3350 17 gm [Miralax Powder 17GM PACKET***] 17 gm PO DAILY 

11/19/19 [History]


Simvastatin 10 mg** [Zocor 10MG**] 10 mg PO QPM 11/19/19 [History]


Hydroxyzine HCl 25 mg*** [Atarax 25 mg***] 1 tab PO TID PRN PRN 02/25/25 

[History]





Hx Tetanus, Diphtheria Vaccination/Date Given: Yes


Hx Influenza Vaccination/Date Given: No


Hx Pneumococcal Vaccination/Date Given: Yes





Travel Risk





- International Travel


Have you traveled outside of the country in past 3 weeks: No





- Emerging Infectious Disease


Are you exhibiting symptoms associated with any current EIDs: Yes


Symptoms: Abdominal Pain, Shortness of Breath





- Review of Systems


Constitutional: No Symptoms, No Fever, No Chills


Eyes: No Symptoms


Ears, Nose, & Throat: No Symptoms


Respiratory: No Symptoms, No Cough, No Dyspnea


Cardiac: No Symptoms, No Chest Pain, No Edema, No Syncope


Abdominal/Gastrointestinal: No Symptoms, No Abdominal Pain, No Nausea, No 

Vomiting, No Diarrhea


Genitourinary Symptoms: No Symptoms, No Dysuria


Musculoskeletal: No Symptoms, No Back Pain, No Neck Pain


Skin: No Symptoms, No Rash


Neurological: No Dizziness, No Focal Weakness, No Sensory Changes


Psychological: No Symptoms


Endocrine: No Symptoms


Hematologic/Lymphatic: No Symptoms


Immunological/Allergic: No Symptoms


All Other Systems: Reviewed and Negative





- Past Medical History


Pertinent Past Medical History: Yes


Neurological History: Seizures


ENT History: Cataracts


Cardiac History: High Cholesterol, Hypertension


Respiratory History: COPD, Pneumonia


Endocrine Medical History: No Pertinent History


Musculoskeletal History: Osteoarthritis


GI Medical History: Gallbladder Disease, Hernia, Other


 History: Renal Disease


Psycho-Social History: Anxiety, Depression


Female Reproductive Disorders: No Pertinent History


Other Medical History: PERF BOWEL 4/2014. pt states she had a seizure because 

she ran out of xanax, pt states she does not remember it, she was taken [facility] 

[facility].  Pt states that her kidneys leak protein.





- Past Surgical History


Past Surgical History: Yes


Neuro Surgical History: No Pertinent History


Cardiac: No Pertinent History


Respiratory: No Pertinent History


Gastrointestinal: Cholecystectomy, Colon Resection


Genitourinary: No Pertinent History


Musculoskeletal: No Pertinent History


Female Surgical History: No Pertinent History


Other Surgical History: colon resection in 2014





- Social History


Smoking Status: Current every day smoker


How long have you smoked: 50 years


Exposure to second hand smoke: Yes


Drug Use: none





- Social Determinants of Health


Will the patient participate in the screening: Declined to provide





- Nursing Vital Signs


Nursing Vital Signs: 


                               Initial Vital Signs











Temperature  97.4 F   02/25/25 21:18


 


Pulse Rate  87   02/25/25 21:18


 


Respiratory Rate  20   02/25/25 21:18


 


Blood Pressure  173/78   02/25/25 21:18


 


O2 Sat by Pulse Oximetry  95   02/25/25 21:18








                                   Pain Scale











Pain Intensity                 0

















- Physical Exam


General Appearance: no apparent distress, alert


Eye Exam: PERRL/EOMI


Neck Exam: normal inspection, supple


Respiratory Exam: diminished breath sounds, rhonchi, wheezing


Cardiovascular/Chest Exam: normal heart sounds, regular rate/rhythm


Abdominal/Gastrointestinal Exam: soft, tenderness, distention (Abdomen slightly 

distended.  Obvious ventral wall hernia.  No rebound no peritoneal signs), No 

mass


Extremity Exam: non-tender, normal range of motion, normal inspection, no calf 

tenderness, no pedal edema


Neurologic Exam: alert, oriented x 3, cooperative, CNs II-XII nml as tested, 

sensation nml, No motor deficits


Skin Exam: normal color, warm, No dry


Lymphatic Exam: No adenopathy


**SpO2 Interpretation**: normal


SpO2: 95


O2 Delivery: Room Air





- Course


Nursing assessment & vital signs reviewed: Yes


EKG Interpreted by Me: RATE (76), Sinus Rhythm, NORMAL AXIS, NORMAL INTERVALS, 

NORMAL QRS





- CT Exams


  ** Abdomen/Pelvis


CT Interpretation: Tele-radiologist Report (Large ventral wall hernia, pleural 

effusion bilaterally, perinephric fat stranding bilaterally, left kidney 

cortical calcification)


Ordered Tests: 


                               Active Orders 24 hr











 Category Date Time Status


 


 Cardiac Monitor STAT Care  02/25/25 21:45 Active


 


 EKG-ER Only STAT Care  02/25/25 21:44 Active


 


 IV Insertion STAT Care  02/25/25 21:44 Active


 


 Pulse Oximetry (ED) STAT Care  02/25/25 21:44 Active


 


 ABDOMEN AND PELVIS W/0 CONTRAS [CT] Stat Exams  02/26/25 00:02 Completed


 


 CHEST 1 VIEW (PORTABLE) Stat Exams  02/25/25 23:18 Completed


 


 BLOOD CULTURE Stat Lab  02/25/25 22:06 Received


 


 CBC W DIFF Stat Lab  02/25/25 21:55 Completed


 


 CMP Stat Lab  02/25/25 21:55 Completed


 


 D-DIMER QUANTITATIVE Stat Lab  02/25/25 22:06 Completed


 


 TROPONIN Q4H Lab  02/25/25 21:55 Completed


 


 TROPONIN Q4H Lab  02/26/25 01:45 Completed


 


 TROPONIN Q4H Lab  02/26/25 05:45 Ordered


 


 Respiratory Therapy Assessment DAILY RT  02/25/25 22:02 Active


 


 Transfer Order Routine Transfer  02/26/25 Ordered








Medication Summary














Discontinued Medications














Generic Name Dose Route Start Last Admin





  Trade Name Freq  PRN Reason Stop Dose Admin


 


Albuterol/Ipratropium  3 ml  02/25/25 21:44  02/25/25 22:00





  Ipratropium/Albuterol Sulfate 3 Ml Ampul.Neb  IH  02/25/25 21:45  3 ml





  STAT ONE   Administration


 


Albuterol/Ipratropium  Confirm  02/25/25 21:58 





  Ipratropium/Albuterol Sulfate 3 Ml Ampul.Neb  Administered  02/25/25 21:59 





  Dose  





  3 ml  





  IH  





  .STK-MED ONE  


 


Methylprednisolone Sodium  0 mg  02/25/25 21:44  02/25/25 21:48





Succinate 125 mg/ Sterile  IV  02/25/25 21:45  125 mg





Water 2 ml  STAT ONE   Administration


 


Hydralazine HCl  12.5 mg  02/26/25 00:20  02/26/25 00:44





  Hydralazine Hcl 25 Mg Tablet  PO  02/26/25 00:21  12.5 mg





  STAT ONE   Administration


 


Ceftriaxone Sodium  2 gm in 100 mls @ 200 mls/hr  02/25/25 23:58  02/26/25 00:12





  Rocephin 2 Gm/100 Ml Nacl  IV  02/26/25 00:27  200 mls/hr





  STAT ONE   200 mls/hr





    Administration


 


Azithromycin  500 mg in 250 mls @ 250 mls/hr  02/25/25 23:58  02/26/25 00:55





  Zithromax 500 Mg/ 250 Ml Nacl Premix  IV  02/26/25 00:57  250 mls/hr





  STAT STA   250 mls/hr





    Administration


 


Ceftriaxone Sodium  Confirm  02/26/25 00:09 





  Rocephin 2 Gm/100 Ml Nacl  Administered  02/26/25 00:10 





  Dose  





  2 gm in 100 mls @ ud  





  IV  





  .STK-MED ONE  


 


Azithromycin  Confirm  02/26/25 00:37 





  Zithromax 500 Mg/ 250 Ml Nacl Premix  Administered  02/26/25 00:38 





  Dose  





  500 mg in 250 mls @ ud  





  IV  





  .STK-MED ONE  


 


Methylprednisolone Sodium Succinate  Confirm  02/25/25 21:48 





  Methylprednis Sod Succ 125 Mg/2 Ml Vial***  Administered  02/25/25 21:49 





  Dose  





  125 mg  





  .ROUTE  





  .STK-MED ONE  


 


Sterile Water  Confirm  02/25/25 21:47 





  Water For Injection,Sterile 10 Ml Vial  Administered  02/25/25 21:48 





  Dose  





  10 ml  





  IJ  





  .STK-MED ONE  











Lab/Rad Data: 


                           Laboratory Result Diagrams





                                 02/25/25 21:55 





                                 02/25/25 21:55 





                               Laboratory Results











  02/26/25 02/25/25 02/25/25 Range/Units





  01:45 22:06 21:55 


 


WBC     (3.98-10.04)  x10^3/uL


 


RBC     (3.93-5.22)  x10^6/uL


 


Hgb     (11.2-15.7)  g/dL


 


Hct     (34.1-44.9)  %


 


MCV     (79.4-94.8)  fL


 


MCH     (25.6-32.2)  pg


 


MCHC     (32.2-35.5)  g/dL


 


RDW     (11.7-14.4)  %


 


Plt Count     (182-369)  x10^3/uL


 


MPV     (9.4-12.3)  fL


 


Gran %     (34.0-71.1)  %


 


Immature Gran % (Auto)     (0.001-0.429)  %


 


Nucleat RBC Rel Count     (0.00-0.2)  %


 


Eos # (Auto)     (0.04-0.36)  x10^3/uL


 


Immature Gran # (Auto)     (0.001-0.031)  x10^3u/L


 


Absolute Lymphs (auto)     (1.18-3.74)  x10^3/uL


 


Absolute Monos (auto)     (0.24-0.86)  x10^3/uL


 


Absolute Nucleated RBC     (0.00-0.012)  x10^3u/L


 


Lymphocytes %     (19.3-51.7)  %


 


Monocytes %     (4.7-12.5)  %


 


Eosinophils %     (0.7-5.8)  %


 


Basophils %     (0.1-1.2)  %


 


Absolute Granulocytes     (1.56-6.13)  x10^3/uL


 


Basophils #     (0.01-0.08)  x10^3/uL


 


D-Dimer   0.49   (0.0-0.50)  mg/L


 


Sodium     (135-145)  mmol/L


 


Potassium     (3.5-5.1)  mmol/L


 


Chloride     ()  mmol/L


 


Carbon Dioxide     (22-30)  mmol/L


 


Anion Gap     (5-15)  MEQ/L


 


BUN     (7-17)  mg/dL


 


Creatinine     (0.52-1.04)  mg/dL


 


Estimated GFR     ML/MIN


 


Glucose     ()  mg/dL


 


Calcium     (8.4-10.2)  mg/dL


 


Total Bilirubin     (0.2-1.3)  mg/dL


 


AST     (14-36)  U/L


 


ALT     (0-35)  U/L


 


Alkaline Phosphatase     ()  U/L


 


Troponin I  < 0.012    (0.000-0.033)  ng/mL


 


Serum Total Protein     (6.3-8.2)  g/dL


 


Albumin     (3.5-5.0)  g/dL


 


Influenza Type A Ag    NEGATIVE  (NEGATIVE)  


 


Influenza Type B Ag    NEGATIVE  (NEGATIVE)  


 


RSV (PCR)    NEGATIVE  (NEGATIVE)  


 


SARS-CoV-2 (PCR)    NEGATIVE  (NEGATIVE)  














  02/25/25 02/25/25 02/25/25 Range/Units





  21:55 21:55 21:55 


 


WBC    9.8  (3.98-10.04)  x10^3/uL


 


RBC    3.63 L  (3.93-5.22)  x10^6/uL


 


Hgb    10.4 L  (11.2-15.7)  g/dL


 


Hct    33.7 L  (34.1-44.9)  %


 


MCV    92.8  (79.4-94.8)  fL


 


MCH    28.7  (25.6-32.2)  pg


 


MCHC    30.9 L  (32.2-35.5)  g/dL


 


RDW    15.4 H  (11.7-14.4)  %


 


Plt Count    285  (182-369)  x10^3/uL


 


MPV    10.0  (9.4-12.3)  fL


 


Gran %    80.2 H  (34.0-71.1)  %


 


Immature Gran % (Auto)    0.4  (0.001-0.429)  %


 


Nucleat RBC Rel Count    0.0  (0.00-0.2)  %


 


Eos # (Auto)    0.12  (0.04-0.36)  x10^3/uL


 


Immature Gran # (Auto)    0.04 H  (0.001-0.031)  x10^3u/L


 


Absolute Lymphs (auto)    1.16 L  (1.18-3.74)  x10^3/uL


 


Absolute Monos (auto)    0.61  (0.24-0.86)  x10^3/uL


 


Absolute Nucleated RBC    0.00  (0.00-0.012)  x10^3u/L


 


Lymphocytes %    11.8 L  (19.3-51.7)  %


 


Monocytes %    6.2  (4.7-12.5)  %


 


Eosinophils %    1.2  (0.7-5.8)  %


 


Basophils %    0.2  (0.1-1.2)  %


 


Absolute Granulocytes    7.88 H  (1.56-6.13)  x10^3/uL


 


Basophils #    0.02  (0.01-0.08)  x10^3/uL


 


D-Dimer     (0.0-0.50)  mg/L


 


Sodium   140   (135-145)  mmol/L


 


Potassium   4.4   (3.5-5.1)  mmol/L


 


Chloride   95 L   ()  mmol/L


 


Carbon Dioxide   39 H   (22-30)  mmol/L


 


Anion Gap   9.8   (5-15)  MEQ/L


 


BUN   10   (7-17)  mg/dL


 


Creatinine   0.65   (0.52-1.04)  mg/dL


 


Estimated GFR   97.0   ML/MIN


 


Glucose   96   ()  mg/dL


 


Calcium   8.7   (8.4-10.2)  mg/dL


 


Total Bilirubin   0.40   (0.2-1.3)  mg/dL


 


AST   20   (14-36)  U/L


 


ALT   28   (0-35)  U/L


 


Alkaline Phosphatase   73   ()  U/L


 


Troponin I  0.014    (0.000-0.033)  ng/mL


 


Serum Total Protein   6.0 L   (6.3-8.2)  g/dL


 


Albumin   3.6   (3.5-5.0)  g/dL


 


Influenza Type A Ag     (NEGATIVE)  


 


Influenza Type B Ag     (NEGATIVE)  


 


RSV (PCR)     (NEGATIVE)  


 


SARS-CoV-2 (PCR)     (NEGATIVE)  














- Progress


Progress: improved


Air Movement: good


Progress Note: 


64-year-old female history of COPD presents to our ED for evaluation of 

shortness of breath.  On exam patient was coarse wheezing with diminished breath

 sounds bilaterally.  Patient added that she has a abdominal hernia.  Patient r

equesting a CT scan as she reports that she has not had a bowel movement in 2 

weeks.  D-dimer negative.  Troponin negative.  Chest x-ray shows right lower 

lobe infiltrate.  Blood cultures obtained.  Antibiotics infused.  Patient 

received Solu-Medrol and a breathing treatment.  Symptoms improved but did not 

resolve.  Patient currently on 3 L nasal cannula.  CT abdomen pelvis shows some 

perinephric fat stranding.  Ventral wall hernia.  Otherwise no acute pathology 

patient will require hospitalization for further evaluation and treatment.  Plan

 of care discussed with patient.  She agrees to admission at [facility] for further evaluation and treatment.











Portions of this note were created with voice recognition technology.  There may

 be grammatical, spelling, punctuation or sound alike errors








Complexity of problem addressed is moderate acute complicated.  No critical care

 time.  Complex of data reviewed and analyzed is extensive.  Test ordered chest 

reviewed results analyzed and correlated clinically with history and physical 

exam.  Management discussed with hospitalist who excepts admission to 

observation.  Risk of complication and or risk of morbidity/mortality of patient

 management is high.  Patient requires hospitalization for further evaluation 

and treatment.  Vital stable.  Time spent admit patient approximately 20 

minutes.  Plan of care established for shared decision making.  No social 

determinants of health present to impede follow-up.





Patient accepted by Dr. Ren at 2:43 AM





Portions of this note were created with voice recognition technology.  There may

 be grammatical, spelling, punctuation or sound alike errors





02/26/25 02:06














Blood Culture(s) Obtained: Yes


Antibiotics given: Yes


Counseled pt/family regarding: lab results, diagnosis, rad results





- Departure


Departure Disposition: Observation


Clinical Impression: 


 SOB (shortness of breath), Hypoxia, COPD exacerbation, Ventral wall hernia, 

Right lower lobe pulmonary infiltrate





Condition: Stable


Critical Care Time: No


Referrals: 


MARCELINO BELLA [Primary Care Provider] - Follow up/PCP as directed


Instructions:  Chronic Obstructive Pulmonary Disease

## 2025-02-26 LAB
ALBUMIN SERPL-MCNC: 3.9 G/DL (ref 3.5–5)
ALP SERPL-CCNC: 77 U/L (ref 38–126)
ALT SERPL-CCNC: 30 U/L (ref 0–35)
ANION GAP SERPL CALC-SCNC: 11.6 MEQ/L (ref 5–15)
AST SERPL QL: 23 U/L (ref 14–36)
BILIRUB BLD-MCNC: 0.3 MG/DL (ref 0.2–1.3)
BUN SERPL-MCNC: 11 MG/DL (ref 7–17)
CALCIUM SPEC-MCNC: 8.9 MG/DL (ref 8.4–10.2)
CHLORIDE SERPL-SCNC: 95 MMOL/L (ref 98–107)
CO2 SERPL-SCNC: 36 MMOL/L (ref 22–30)
CREAT SERPL-MCNC: 0.6 MG/DL (ref 0.52–1.04)
GFR SERPLBLD BASED ON 1.73 SQ M-ARVRAT: 98.9 ML/MIN
GLUCOSE SERPL-MCNC: 180 MG/DL (ref 74–106)
HCT VFR BLD AUTO: 35.2 % (ref 34.1–44.9)
HGB BLD-MCNC: 10.9 G/DL (ref 11.2–15.7)
MCH RBC QN AUTO: 28.6 PG (ref 25.6–32.2)
MCHC RBC AUTO-ENTMCNC: 31 G/DL (ref 32.2–35.5)
NT-PROBNP SERPL-MCNC: 2580 PG/ML (ref ?–300)
PLATELET # BLD AUTO: 280 X10^3/UL (ref 182–369)
POTASSIUM SERPLBLD-SCNC: 4.4 MMOL/L (ref 3.5–5.1)
PROCALCITONIN SERPL-MCNC: 0.03 NG/ML (ref 0.03–0.08)
PROT SERPL-MCNC: 6.5 G/DL (ref 6.3–8.2)
RBC # BLD AUTO: 3.81 X10^6/UL (ref 3.93–5.22)
RBC # URNS HPF: (no result) /HPF (ref 0–5)
SODIUM SERPL-SCNC: 138 MMOL/L (ref 135–145)
WBC # BLD AUTO: 9.9 X10^3/UL (ref 3.98–10.04)
WBC URNS QL MICRO: (no result) /HPF (ref 0–5)

## 2025-02-26 RX ADMIN — BUDESONIDE SCH MG: 0.5 SUSPENSION RESPIRATORY (INHALATION) at 07:10

## 2025-02-26 RX ADMIN — HYDROXYZINE HYDROCHLORIDE PRN MG: 25 TABLET, FILM COATED ORAL at 12:33

## 2025-02-26 RX ADMIN — ALBUTEROL SULFATE PRN MG: 2.5 SOLUTION RESPIRATORY (INHALATION) at 05:40

## 2025-02-26 RX ADMIN — CEFTRIAXONE SODIUM ONE MLS/HR: 2 INJECTION, POWDER, FOR SOLUTION INTRAMUSCULAR; INTRAVENOUS at 00:12

## 2025-02-26 RX ADMIN — CARVEDILOL SCH MG: 12.5 TABLET, FILM COATED ORAL at 09:41

## 2025-02-26 RX ADMIN — TRAZODONE HYDROCHLORIDE ONE MG: 50 TABLET ORAL at 06:26

## 2025-02-26 RX ADMIN — SIMVASTATIN SCH MG: 10 TABLET, FILM COATED ORAL at 21:31

## 2025-02-26 RX ADMIN — ESCITALOPRAM OXALATE SCH MG: 10 TABLET ORAL at 09:41

## 2025-02-26 RX ADMIN — GABAPENTIN SCH MG: 300 CAPSULE ORAL at 09:41

## 2025-02-26 RX ADMIN — CARBIDOPA AND LEVODOPA SCH TAB: 25; 100 TABLET ORAL at 10:43

## 2025-02-26 RX ADMIN — POLYETHYLENE GLYCOL 3350 SCH GM: 17 POWDER, FOR SOLUTION ORAL at 09:41

## 2025-02-26 RX ADMIN — METFORMIN HYDROCHLORIDE SCH MG: 500 TABLET ORAL at 09:41

## 2025-02-26 RX ADMIN — TRAZODONE HYDROCHLORIDE SCH MG: 50 TABLET ORAL at 21:32

## 2025-02-26 RX ADMIN — AZITHROMYCIN DIHYDRATE SCH MLS/HR: 500 INJECTION, POWDER, LYOPHILIZED, FOR SOLUTION INTRAVENOUS at 22:29

## 2025-02-26 RX ADMIN — CEFTRIAXONE SODIUM SCH MLS/HR: 1 INJECTION, POWDER, FOR SOLUTION INTRAMUSCULAR; INTRAVENOUS at 21:31

## 2025-02-26 RX ADMIN — IPRATROPIUM BROMIDE AND ALBUTEROL SULFATE SCH ML: .5; 3 SOLUTION RESPIRATORY (INHALATION) at 07:10

## 2025-02-26 RX ADMIN — HYDROCODONE BITARTRATE AND ACETAMINOPHEN PRN TAB: 5; 325 TABLET ORAL at 16:17

## 2025-02-26 RX ADMIN — HYDRALAZINE HYDROCHLORIDE PRN MG: 20 INJECTION INTRAMUSCULAR; INTRAVENOUS at 12:33

## 2025-02-26 RX ADMIN — NIFEDIPINE SCH MG: 30 TABLET, FILM COATED, EXTENDED RELEASE ORAL at 09:41

## 2025-02-26 RX ADMIN — ENOXAPARIN SODIUM SCH MG: 100 INJECTION SUBCUTANEOUS at 09:40

## 2025-02-26 RX ADMIN — WATER SCH MG: 1 INJECTION INTRAMUSCULAR; INTRAVENOUS; SUBCUTANEOUS at 13:38

## 2025-02-26 RX ADMIN — AZITHROMYCIN DIHYDRATE STA MLS/HR: 500 INJECTION, POWDER, LYOPHILIZED, FOR SOLUTION INTRAVENOUS at 00:55

## 2025-02-26 RX ADMIN — HYDRALAZINE HYDROCHLORIDE ONE MG: 25 TABLET ORAL at 00:44

## 2025-02-26 RX ADMIN — CITROMA MAGNESIUM CITRATE ONE ML: 1.75 LIQUID ORAL at 05:54

## 2025-02-26 RX ADMIN — FUROSEMIDE SCH MG: 40 TABLET ORAL at 09:41

## 2025-02-26 NOTE — XRAY
CLINICAL HISTORY: sob

COMPARISON: None.

TECHNIQUE: X-ray images of the chest was obtained in the frontal projection.

FINDINGS:

Pulmonary Parenchyma:

 Small subpleural opacity is noted in the right lower zone.

 Blunting of the right costophrenic angle.

 Thickening of the right fissure.

 Bilateral hilar prominence and prominent pulmonary markings.



 Heart and Mediastinum:

 Mild cardiomegaly.. No mediastinal masses.



 Bony Thorax:

 Bony thorax appears intact..

 Bilateral shoulder joints show degenerative changes.



 Soft Tissues:

 Soft tissues overlying the chest wall are unremarkable.

IMPRESSION:

1. Small subpleural opacity in the right lower zone. Could be

infiltrated/atelectasis. Clinical correlation and follow-up are recommended.

2. Blunting of the right costophrenic angle. Possibly mild pleural effusion

with atelectasis.

3. Bilateral hilar prominence and prominent pulmonary markings. Could be

vascular congestion/interstitial pulmonary edema.

4. Mild cardiomegaly.

5. No acute cardiopulmonary abnormalities were identified.



_____________________________________

Electronically Signed by: Daniel Weiss MD. (02/26/2025 01:42:17 EST)

## 2025-02-26 NOTE — PCM.HP
History of Present Illness





- Chief Complaint


Chief Complaint: COPD exacerbation, pneumonia, hypoxia


Date: 02/26/25


History of Present Illness: 


 is a 64 year old female.With past medical history significant for COPD

on home 3 to 5 L oxygen as needed, hypertension, hyperlipidemia, diabetes 

mellitus type 2, insomnia/anxiety who came to the ER complaining of shortness of

breath for last couple of weeks with fever and being constipated.  She is a 

current smoker she use 3 to 5 L oxygen at home only as per need she denied 

having any chest pain no other GI urinary symptoms reported except being 

constipated for couple of weeks.  In the ER the initial blood pressure was 

173/78 she was afebrile with pulse of 87 as well as lab work concerned, all 

remained essentially negative troponin unremarkable she was tested negative for 

influenza and COVID D-dimer was running low liver enzymes negative CT abdomen 

pelvis showed some perinephric fat stranding ventral wall hernia otherwise no 

acute pathology.  X-ray showed some right lower lobe infiltrate atelectasis as w

ell.  Patient admitted for COPD exacerbation and constipation.





- Review of Systems


All Other Systems: Reviewed and Negative





Medications & Allergies


Home Medications: 


                              Home Medication List





Escitalopram Oxalate** [Lexapro**] 20 mg PO DAILY 12/17/15 [History Confirmed 

02/25/25]


Gabapentin 300 mg PO BID 12/17/15 [History Confirmed 02/25/25]


Budesonide/Formoterol Fumarate [Symbicort 160-4.5 Mcg Inhaler] 6 gm IH BID 

01/21/17 [History Confirmed 02/25/25]


Potassium Chloride Tab* [Klor Con] 20 meq PO BID 07/21/18 [History Confirmed 

02/25/25]


Carvedilol 12.5 mg*** [Coreg 12.5 mg***] 12.5 mg PO BID 10/08/19 [History 

Confirmed 02/25/25]


Furosemide [Lasix] 40 mg PO BID 10/08/19 [History Confirmed 02/25/25]


Hydralazine HCl 10 mg PO TID 10/08/19 [History Confirmed 02/26/25]


Trazodone HCl 50 mg*** [Desyrel 50 mg***] 150 mg PO HS 10/08/19 [History 

Confirmed 02/25/25]


Polyethylene Glycol 3350 17 gm [Miralax Powder 17GM PACKET***] 17 gm PO DAILY 

11/19/19 [History Confirmed 02/25/25]


Simvastatin 10 mg** [Zocor 10MG**] 10 mg PO QPM 11/19/19 [History Confirmed 

02/25/25]


Hydroxyzine HCl 25 mg*** [Atarax 25 mg***] 1 tab PO TID PRN PRN 02/25/25 

[History Confirmed 02/25/25]


Metformin HCl 500 mg*** [Glucophage 500 MG***] 500 mg PO DAILY 02/26/25 [History

 Confirmed 02/26/25]








Allergies/Adverse Reactions: 


                                    Allergies











Allergy/AdvReac Type Severity Reaction Status Date / Time


 


No Known Drug Allergies Allergy   Verified 02/25/25 21:21














- Past Medical History


Past Medical History: Yes


Neurological History: Seizures


ENT History: Cataracts


Cardiac History: High Cholesterol, Hypertension


Respiratory History: COPD, Pneumonia


Endocrine Medical History: No Pertinent History


Musculoskelatal History: Osteoarthritis


GI Medical History: Gallbladder Disease, Hernia, Other


 History: Renal Disease


Pyscho-Social History: Anxiety, Depression


Reproductive Disorders: No Pertinent History


Comment: PERF BOWEL 4/2014. pt states she had a seizure because she ran out of 

xanax, pt states she does not remember it, [facility]  

Pt states that her kidneys leak protein.





- Past Surgical History


Past Surgical History: Yes


Neuro Surgical History: No Pertinent History


Cardiac History: No Pertinent History


Respiratory Surgery: No Pertinent History


GI Surgical History: Cholecystectomy, Colon Resection


Genitourinary Surgical Hx: No Pertinent History


Musculskeletal Surgical Hx: No Pertinent History


Female Surgical History: No Pertinent History


Other Surgical History: colon resection in 2014


Significant Family History: no pertinent family hx





- Social History


Smoking Status: Former smoker


How long have you smoked: 50 years


Exposure to second hand smoke: Yes


Alcohol: None


Drug Use: none





- Social Determinants of Health


Will the patient participate in the screening: Yes


Do you worry about a steady place to live?: No


Do you have any problems with any of the following?: No known problems


In the past 12 months,have you had to go without utilities?: No


Have you or anyone in your house had to go without enough: No


Transportation Issues: No


Has anyone in your support network made you feel unsafe?: No


Does the patient want assistance with any of the above?: No





- Physical Exam


Vital Signs: 


                               Vital Signs - 24 hr











  Temp Pulse Resp BP BP Pulse Ox


 


 02/26/25 03:07  97.6 F  71  24   192/91  94 L


 


 02/26/25 02:46       95


 


 02/26/25 02:00   83  15  181/117  


 


 02/26/25 01:30   82  17  159/62   97


 


 02/26/25 01:00   80  18  198/108   99


 


 02/26/25 00:50   82  19    99


 


 02/26/25 00:46    23    99


 


 02/26/25 00:02   71  18  168/109   97


 


 02/26/25 00:00   77  18  218/107   97


 


 02/25/25 23:30   77  18  180/92   98


 


 02/25/25 23:14   79  17  193/123  


 


 02/25/25 23:10   68  30 H  193/123   97


 


 02/25/25 23:01   67  18    97


 


 02/25/25 22:32   77  21  184/94   96


 


 02/25/25 22:30   74  18  201/105   98


 


 02/25/25 22:08   66  21  185/94   97


 


 02/25/25 22:06   71  17  169/123   100


 


 02/25/25 22:00   82  18    95


 


 02/25/25 21:44       95


 


 02/25/25 21:31    26 H    95


 


 02/25/25 21:21   83  24  173/78   100


 


 02/25/25 21:18  97.4 F  87  20   173/78  95











Additional Findings: 


02/26/25 05:28





HEENT Young aged, average built in no distress


NECK  Supple,no thyromegaly, 


CVS S1+S2 + 0, no murmers


RESP Bilateral equal air entry without Crepts/Wheezes heard


GIT Soft non tender,non distended


Skin, No rah, no Bruises   


LEGS No Edema   


PSYCH Normal,m ood, judgement and insight


NEURO AOX3, no focal deficit











Results





- Labs


Lab/Micro Results: 


                            Lab Results-Last 24 Hours











  02/25/25 02/25/25 02/25/25 Range/Units





  21:55 21:55 21:55 


 


WBC  9.8    (3.98-10.04)  x10^3/uL


 


RBC  3.63 L    (3.93-5.22)  x10^6/uL


 


Hgb  10.4 L    (11.2-15.7)  g/dL


 


Hct  33.7 L    (34.1-44.9)  %


 


MCV  92.8    (79.4-94.8)  fL


 


MCH  28.7    (25.6-32.2)  pg


 


MCHC  30.9 L    (32.2-35.5)  g/dL


 


RDW  15.4 H    (11.7-14.4)  %


 


Plt Count  285    (182-369)  x10^3/uL


 


MPV  10.0    (9.4-12.3)  fL


 


Gran %  80.2 H    (34.0-71.1)  %


 


Immature Gran % (Auto)  0.4    (0.001-0.429)  %


 


Nucleat RBC Rel Count  0.0    (0.00-0.2)  %


 


Eos # (Auto)  0.12    (0.04-0.36)  x10^3/uL


 


Immature Gran # (Auto)  0.04 H    (0.001-0.031)  x10^3u/L


 


Absolute Lymphs (auto)  1.16 L    (1.18-3.74)  x10^3/uL


 


Absolute Monos (auto)  0.61    (0.24-0.86)  x10^3/uL


 


Absolute Nucleated RBC  0.00    (0.00-0.012)  x10^3u/L


 


Lymphocytes %  11.8 L    (19.3-51.7)  %


 


Monocytes %  6.2    (4.7-12.5)  %


 


Eosinophils %  1.2    (0.7-5.8)  %


 


Basophils %  0.2    (0.1-1.2)  %


 


Absolute Granulocytes  7.88 H    (1.56-6.13)  x10^3/uL


 


Basophils #  0.02    (0.01-0.08)  x10^3/uL


 


D-Dimer     (0.0-0.50)  mg/L


 


Sodium   140   (135-145)  mmol/L


 


Potassium   4.4   (3.5-5.1)  mmol/L


 


Chloride   95 L   ()  mmol/L


 


Carbon Dioxide   39 H   (22-30)  mmol/L


 


Anion Gap   9.8   (5-15)  MEQ/L


 


BUN   10   (7-17)  mg/dL


 


Creatinine   0.65   (0.52-1.04)  mg/dL


 


Estimated GFR   97.0   ML/MIN


 


Glucose   96   ()  mg/dL


 


Calcium   8.7   (8.4-10.2)  mg/dL


 


Total Bilirubin   0.40   (0.2-1.3)  mg/dL


 


AST   20   (14-36)  U/L


 


ALT   28   (0-35)  U/L


 


Alkaline Phosphatase   73   ()  U/L


 


Troponin I    0.014  (0.000-0.033)  ng/mL


 


Serum Total Protein   6.0 L   (6.3-8.2)  g/dL


 


Albumin   3.6   (3.5-5.0)  g/dL


 


Influenza Type A Ag     (NEGATIVE)  


 


Influenza Type B Ag     (NEGATIVE)  


 


RSV (PCR)     (NEGATIVE)  


 


SARS-CoV-2 (PCR)     (NEGATIVE)  














  02/25/25 02/25/25 02/26/25 Range/Units





  21:55 22:06 01:45 


 


WBC     (3.98-10.04)  x10^3/uL


 


RBC     (3.93-5.22)  x10^6/uL


 


Hgb     (11.2-15.7)  g/dL


 


Hct     (34.1-44.9)  %


 


MCV     (79.4-94.8)  fL


 


MCH     (25.6-32.2)  pg


 


MCHC     (32.2-35.5)  g/dL


 


RDW     (11.7-14.4)  %


 


Plt Count     (182-369)  x10^3/uL


 


MPV     (9.4-12.3)  fL


 


Gran %     (34.0-71.1)  %


 


Immature Gran % (Auto)     (0.001-0.429)  %


 


Nucleat RBC Rel Count     (0.00-0.2)  %


 


Eos # (Auto)     (0.04-0.36)  x10^3/uL


 


Immature Gran # (Auto)     (0.001-0.031)  x10^3u/L


 


Absolute Lymphs (auto)     (1.18-3.74)  x10^3/uL


 


Absolute Monos (auto)     (0.24-0.86)  x10^3/uL


 


Absolute Nucleated RBC     (0.00-0.012)  x10^3u/L


 


Lymphocytes %     (19.3-51.7)  %


 


Monocytes %     (4.7-12.5)  %


 


Eosinophils %     (0.7-5.8)  %


 


Basophils %     (0.1-1.2)  %


 


Absolute Granulocytes     (1.56-6.13)  x10^3/uL


 


Basophils #     (0.01-0.08)  x10^3/uL


 


D-Dimer   0.49   (0.0-0.50)  mg/L


 


Sodium     (135-145)  mmol/L


 


Potassium     (3.5-5.1)  mmol/L


 


Chloride     ()  mmol/L


 


Carbon Dioxide     (22-30)  mmol/L


 


Anion Gap     (5-15)  MEQ/L


 


BUN     (7-17)  mg/dL


 


Creatinine     (0.52-1.04)  mg/dL


 


Estimated GFR     ML/MIN


 


Glucose     ()  mg/dL


 


Calcium     (8.4-10.2)  mg/dL


 


Total Bilirubin     (0.2-1.3)  mg/dL


 


AST     (14-36)  U/L


 


ALT     (0-35)  U/L


 


Alkaline Phosphatase     ()  U/L


 


Troponin I    < 0.012  (0.000-0.033)  ng/mL


 


Serum Total Protein     (6.3-8.2)  g/dL


 


Albumin     (3.5-5.0)  g/dL


 


Influenza Type A Ag  NEGATIVE    (NEGATIVE)  


 


Influenza Type B Ag  NEGATIVE    (NEGATIVE)  


 


RSV (PCR)  NEGATIVE    (NEGATIVE)  


 


SARS-CoV-2 (PCR)  NEGATIVE    (NEGATIVE)  














- Radiology Impressions


Radiology Exams & Impressions: 


                              Radiology Procedures











 Category Date Time Status


 


 ABDOMEN AND PELVIS W/0 CONTRAS [CT] Stat Exams  02/26/25 00:02 Completed


 


 CHEST 1 VIEW (PORTABLE) Stat Exams  02/25/25 23:18 Completed














- Other Procedures and Tests


                               Respiratory Therapy





02/26/25 04:50


Oxygen Nasal Cannula 3 lpm 


Respiratory Therapy Assessment DAILY 














Assessment/Plan


(1) COPD exacerbation


Current Visit: Yes   Status: Acute   Code(s): J44.1 - CHRONIC OBSTRUCTIVE 

PULMONARY DISEASE W (ACUTE) EXACERBATION   





(2) Right lower lobe pulmonary infiltrate


Current Visit: Yes   Status: Acute   Code(s): R91.8 - OTHER NONSPECIFIC ABNORMAL

 FINDING OF LUNG FIELD   





(3) Acute on chronic respiratory failure with hypoxemia


Current Visit: No   Status: Acute   Code(s): J96.21 - ACUTE AND CHRONIC 

RESPIRATORY FAILURE WITH HYPOXIA   





(4) Constipation


Current Visit: Yes   Status: Acute   Code(s): K59.00 - CONSTIPATION, UNSPECIFIED

   





Telemedicine Encounter





- Telemedicine Encounter


Telemedicine Encounter: 


The entirety of this encounter was performed via Telemedicine"This visit was 

performed using real-time audio and video connection between my location and 

thepatients locationwith the assistance of a surrogateat the patients 

location. Written or verbal consent was obtained from the patient/guardian to 

perform this visit using[employer] technology. Any patient 

questions regarding the telemedicine interaction were answered.





Acute on chronic hypoxemic respiratory failure


Due to underlying COPD/pneumonia


Tested -ve for influenza/Covid


Troponin unremarkable and so does the D-dimer


Will check BNP, will check echocardiogram


Continue oxygen supplements to keep sats more than 90% patient is currently on 3

 L oxygen


At home she use 3 to 5 L only as per need





Acute COPD exacerbation


Continue breathing therapy.  I will add Pulmicort


Continue steroids


Continue antibiotics





Right lower lung lobe infiltrate


Will check procalcitonin


Continue antibiotics


Advised incentive spirometry





Hypertension, uncontrolled


Patient is on Coreg and hydralazine


I will add nifedipine 30 mg daily for better blood pressure management


Hydralazine as pended for systolic more than 180





Chronic congestive heart failure


Ejection fraction unknown


Imaging consistent with pulmonary edema and pleural effusion


Patient is on Lasix at home resumed here


Will check BNP and echocardiogram





Constipation


Continue Senokot and MiraLAX


Will give 1 stat dose of mag citrate





Abdominal wall hernia


With bowel loops but without obstruction


Continue supportive therapy





Diabetes mellitus type 2


We will check HbA1c


Continue sliding scale


Keep holding metformin for now





Insomnia


Continue trazodone





DVT prophylaxis SCD/Lovenox





CODE STATUS full





Discharge planning pending clinical stability.  I have reviewed patient lab 

vitals and imaging in detail question and concerns addressed

## 2025-02-26 NOTE — XRAY
CLINICAL HISTORY: pain

COMPARISON: None.

TECHNIQUE: CT of the abdomen and pelvis was performed, with the following

protocol: axial images, and reconstructed coronal and sagittal images. No

intravenous contrast was administered. One of the following dose reduction

techniques was utilized for this exam: Automated exposure control, adjustment

of the mA and/or kV according to patient size, and use of iterative

reconstruction.

FINDINGS:

Sections of lower thorax show minimal bilateral pleural effusion. Round

atelectasis in posterobasal segment of right lower lobe. Thin fibroatelectatic

bands in both lungs.



Abdomen:



Liver:

Normal in size, and density. No focal lesions, cysts, or masses were

identified.



Gallbladder and Biliary System:

 Post cholecystectomy status.



Pancreas:

Pancreatic parenchymal atrophy.



Spleen:

Normal in size, shape, and density. No splenic lesions or masses were

identified.



Kidneys and Adrenal Glands:

Mild bilateral perinephric fat stranding noted.

 Focal 5.5 mm calcification at mid pole of left kidney, possible cortical

calcification,Less likely calculus.

 Few renal cortical cysts on both sides.

Both adrenal glands appears slightly bulky and nodular.



Pelvis:

Urinary Bladder: Normal in contour and wall thickness.

 Uterus and adnexa appear unremarkable.



Peritoneal and Retroperitoneal Structures:

No free fluid or abnormal fluid collections were identified within the abdomen

or pelvis.

The abdominal aorta and its major branches shows atherosclerotic changes with

calcified plaques.



Bowel:

 Few scattered uncomplicated diverticulae

The visualized bowel loops are normal in caliber and appearance.

No evidence of bowel obstruction or wall thickening.

Appendix appears unremarkable.



Bones and Soft Tissues:

Degenerative changes in lumbar spine. Mild retrolisthesis of L1 over L2.

Large ventral abdominal wall hernia with bowel loops as content

IMPRESSION:

1. Sections of lower thorax show minimal bilateral pleural effusion. Round

atelectasis in posterobasal segment of right lower lobe.

2. No definite acute abnormality in CT abdomen and pelvis

3. Mild bilateral perinephric fat stranding noted.

4. Focal 5.5 mm calcification at mid pole of left kidney, possible cortical

calcification, less likely calculus.

5. Large ventral abdominal wall hernia with bowel loops as content

6. Uncomplicated diverticulosis.

7. Rest of the findings as detailed above



_____________________________________

Electronically Signed by: Daniel Weiss MD. (02/26/2025 01:46:09 EST)

## 2025-02-27 VITALS
OXYGEN SATURATION: 95 % | RESPIRATION RATE: 20 BRPM | SYSTOLIC BLOOD PRESSURE: 139 MMHG | HEART RATE: 76 BPM | DIASTOLIC BLOOD PRESSURE: 63 MMHG | TEMPERATURE: 97.3 F

## 2025-02-27 LAB
ANION GAP SERPL CALC-SCNC: 9.6 MEQ/L (ref 5–15)
BUN SERPL-MCNC: 21 MG/DL (ref 7–17)
CALCIUM SPEC-MCNC: 8.4 MG/DL (ref 8.4–10.2)
CHLORIDE SERPL-SCNC: 94 MMOL/L (ref 98–107)
CO2 SERPL-SCNC: 39 MMOL/L (ref 22–30)
CREAT SERPL-MCNC: 0.74 MG/DL (ref 0.52–1.04)
GFR SERPLBLD BASED ON 1.73 SQ M-ARVRAT: 89.2 ML/MIN
GLUCOSE SERPL-MCNC: 199 MG/DL (ref 74–106)
HCT VFR BLD AUTO: 31.3 % (ref 34.1–44.9)
HGB BLD-MCNC: 9.5 G/DL (ref 11.2–15.7)
MCH RBC QN AUTO: 27.8 PG (ref 25.6–32.2)
MCHC RBC AUTO-ENTMCNC: 30.4 G/DL (ref 32.2–35.5)
PLATELET # BLD AUTO: 263 X10^3/UL (ref 182–369)
POTASSIUM SERPLBLD-SCNC: 3.7 MMOL/L (ref 3.5–5.1)
RBC # BLD AUTO: 3.42 X10^6/UL (ref 3.93–5.22)
SODIUM SERPL-SCNC: 139 MMOL/L (ref 135–145)
WBC # BLD AUTO: 9.9 X10^3/UL (ref 3.98–10.04)

## 2025-02-27 RX ADMIN — ACETAMINOPHEN PRN MG: 325 TABLET ORAL at 00:27

## 2025-02-27 RX ADMIN — WATER SCH MG: 1 INJECTION INTRAMUSCULAR; INTRAVENOUS; SUBCUTANEOUS at 09:38

## 2025-02-27 NOTE — CONS
HISTORY:  This is a 64-year-old female who presented to [facility] and was 
admitted.  She has multiple acute diagnoses including an exacerbation of her hypertension, 
as well as pneumonia, which she is being treated for by the medical physicians.  I have 
been consulted for an abdominal hernia.  The patient states that she has had this hernia 
for many years.  It has been extremely large for many years.  She does not want to fix 
this hernia and does not want to have surgery.  She says that she has had surgery 
previously and had to be on the ventilator with a long hospital course after surgery.  She 
also is on 3 to 5L of oxygen daily at home for COPD.  Currently, her blood pressure has 
been very high and today her highest blood pressure was 218/107.  She is not nauseous.  
She is eating okay.  Her abdominal pain has decreased.  She is able to get up.  She wants 
to leave the hospital.  She is passing gas and she also had a large bowel movement today. 



Her past medical history and past surgical history are consistent with cholecystectomy, 
colectomy, chronic large ventral hernia, COPD, hypertension, hyperlipidemia, diabetes, 
anxiety, history of a seizure.



PHYSICAL EXAMINATION:  

GENERAL:  No acute distress.  

CARDIOVASCULAR:  Regular, rate and rhythm. 

PULMONARY:  Nonlabored respirations on oxygen. 

ABDOMEN:  Soft, not tender.  Large ventral incisional hernia that starts at the center of 
her midline incision and extends superiorly and to the right.  This is an extremely large 
defect with loss of domain and contains bowel.  It is extremely soft.  There are chronic 
skin changes with slightly thinned skin here due to the chronicity.  There are no acute 
skin changes.  There is no cellulitis and again, she is not tender at all here.  The rest 
of her abdomen is nontender.  She is obese. 



LABORATORY:  Labs have been reviewed.  Her creatinine is 0.6.  Her white blood cell count 
is 9.9.  Her hemoglobin is slightly low at 10.9.  



CT scan, she has some perinephric stranding, diverticulosis, and the large hernia with 
loss of domain containing bowel. 



ASSESSMENT AND PLAN:  This is a patient with multiple medical comorbidities.  She is an 
extremely poor surgical candidate for ventral hernia repair.  Should she elect to proceed 
with a ventral hernia repair down the road, her blood pressure, diabetes and COPD would 
have to be optimized and I would expect she would need a full abdominal reconstruction and 
likely would remain intubated after the procedure.  She understands this and she does not 
want to consider surgery at this time.  I did let her and her nurse know that she does 
have a mild anemia.  Our group will be happy to see her in consultation as an outpatient 
for evaluation for EGD and colonoscopy if this is needed for her hemoglobin workup once 
her blood pressure is stable on an outpatient basis.  We are also happy to reexamine her 
hernia, but at this time, we recommend no surgical intervention.



Thank you for the consult.

## 2025-02-27 NOTE — PCM.DS
Discharge Summary


Date of Admission: 


02/26/25 03:02





Date of Discharge: 





2/27/25


Admitting Physician: 


ROBIN ACKERMAN MD





Consults: 





                                Consults on Case





02/26/25 11:00


Consult Surgery ROUTINE 











Primary Care Provider: 


MARCELINO BELLA








Allergies


Allergies





No Known Drug Allergies Allergy (Verified 02/25/25 21:21)


   











Hospital Summary





- Hospital Course


Hospital Course: 


2/27/25


 is a 64 year old female.With past medical history significant for COPD

on home 3 to 5 L oxygen as needed, hypertension, hyperlipidemia, diabetes 

mellitus type 2, and insomnia/anxiety. She came to the ER on 2/26/25 complaining

of shortness of breath for last couple of weeks with fever and being 

constipated.  She is a current smoker she uses 3 to 5 L oxygen at home only as 

per need she denied having any chest pain no other GI urinary symptoms reported 

except being constipated for couple of weeks. This has resolved since admission.

In the ER the initial blood pressure was 173/78 she was afebrile with pulse of 

87 as well as lab work concerned, all remained essentially negative troponin 

unremarkable. She tested negative for influenza and COVID.D-dimer negative. CT 

abdomen pelvis showed some perinephric fat stranding ventral wall hernia 

otherwise no acute pathology. UA negative  X-ray showed some right lower lobe 

infiltrate atelectasis as well.  Patient admitted for COPD exacerbation, 

pneumonia, and constipation. Today she is on baseline O2 of 3LNC at 98%. BC x2 

negative. Surgery consulted yesterday for hernia and explained she is not a 

surgical candidate at this time. Pt ready to d/c home today. She reports feeling

much better. Labs overall improved. Will continue OP antibiotics and steroids. 

She denies any further concerns at this time. 








- Vitals & Intake/Output


Vital Signs: 





                                   Vital Signs











Temperature  97.7 F   02/27/25 07:23


 


Pulse Rate  75   02/27/25 07:23


 


Respiratory Rate  16   02/27/25 07:23


 


Blood Pressure  159/70   02/27/25 07:23


 


O2 Sat by Pulse Oximetry  98   02/27/25 07:23











Intake & Output: 





                                 Intake & Output











 02/24/25 02/25/25 02/26/25 02/27/25





 11:59 11:59 11:59 11:59


 


Intake Total   480 1416


 


Output Total   200 600


 


Balance   280 816


 


Weight   76.8 kg 














- Lab


Result Diagrams: 


                                 02/27/25 04:44





                                 02/27/25 04:44


Lab Results-Last 24 Hrs: 





                            Lab Results-Last 24 Hours











  02/26/25 02/26/25 02/26/25 Range/Units





  06:38 06:38 06:38 


 


WBC  9.9    (3.98-10.04)  x10^3/uL


 


RBC  3.81 L    (3.93-5.22)  x10^6/uL


 


Hgb  10.9 L    (11.2-15.7)  g/dL


 


Hct  35.2    (34.1-44.9)  %


 


MCV  92.4    (79.4-94.8)  fL


 


MCH  28.6    (25.6-32.2)  pg


 


MCHC  31.0 L    (32.2-35.5)  g/dL


 


RDW  15.4 H    (11.7-14.4)  %


 


Plt Count  280    (182-369)  x10^3/uL


 


MPV  10.6    (9.4-12.3)  fL


 


Sodium   138   (135-145)  mmol/L


 


Potassium   4.4   (3.5-5.1)  mmol/L


 


Chloride   95 L   ()  mmol/L


 


Carbon Dioxide   36 H   (22-30)  mmol/L


 


Anion Gap   11.6   (5-15)  MEQ/L


 


BUN   11   (7-17)  mg/dL


 


Creatinine   0.60   (0.52-1.04)  mg/dL


 


Estimated GFR   98.9   ML/MIN


 


Glucose   180 H   ()  mg/dL


 


POC Glucometer     (74 to 106)  mg/dL


 


Hemoglobin A1c     (4.5-6.0)  %


 


Calcium   8.9   (8.4-10.2)  mg/dL


 


Magnesium    1.9  (1.6-2.3)  mg/dL


 


Total Bilirubin   0.30   (0.2-1.3)  mg/dL


 


AST   23   (14-36)  U/L


 


ALT   30   (0-35)  U/L


 


Alkaline Phosphatase   77   ()  U/L


 


Serum Total Protein   6.5   (6.3-8.2)  g/dL


 


Albumin   3.9   (3.5-5.0)  g/dL


 


Urine Color     (Yellow)  


 


Urine Appearance     (Clear)  


 


Urine pH     (4.6-8.0)  


 


Ur Specific Gravity     (1.005-1.030)  


 


Urine Protein     (Negative)  


 


Urine Glucose (UA)     (Negative)  mg/dL


 


Urine Ketones     (Negative)  


 


Urine Blood     (Negative)  


 


Urine Nitrite     (Negative)  


 


Urine Bilirubin     (Negative)  


 


Urine Urobilinogen     (0.2)  mg/dL


 


Ur Leukocyte Esterase     (Negative)  


 


U Hyaline Cast (Auto)     (0-2)  /LPF


 


Urine Microscopic RBC     (0-5)  /HPF


 


Urine Microscopic WBC     (0-5)  /HPF


 


Ur Epithelial Cells     (None Seen)  /HPF


 


Urine Bacteria     (None Seen)  /HPF


 


Urine Culture Reflexed     (NO)  














  02/26/25 02/26/25 02/26/25 Range/Units





  06:38 16:25 16:32 


 


WBC     (3.98-10.04)  x10^3/uL


 


RBC     (3.93-5.22)  x10^6/uL


 


Hgb     (11.2-15.7)  g/dL


 


Hct     (34.1-44.9)  %


 


MCV     (79.4-94.8)  fL


 


MCH     (25.6-32.2)  pg


 


MCHC     (32.2-35.5)  g/dL


 


RDW     (11.7-14.4)  %


 


Plt Count     (182-369)  x10^3/uL


 


MPV     (9.4-12.3)  fL


 


Sodium     (135-145)  mmol/L


 


Potassium     (3.5-5.1)  mmol/L


 


Chloride     ()  mmol/L


 


Carbon Dioxide     (22-30)  mmol/L


 


Anion Gap     (5-15)  MEQ/L


 


BUN     (7-17)  mg/dL


 


Creatinine     (0.52-1.04)  mg/dL


 


Estimated GFR     ML/MIN


 


Glucose     ()  mg/dL


 


POC Glucometer   116 H   (74 to 106)  mg/dL


 


Hemoglobin A1c  5.91    (4.5-6.0)  %


 


Calcium     (8.4-10.2)  mg/dL


 


Magnesium     (1.6-2.3)  mg/dL


 


Total Bilirubin     (0.2-1.3)  mg/dL


 


AST     (14-36)  U/L


 


ALT     (0-35)  U/L


 


Alkaline Phosphatase     ()  U/L


 


Serum Total Protein     (6.3-8.2)  g/dL


 


Albumin     (3.5-5.0)  g/dL


 


Urine Color    Yellow  (Yellow)  


 


Urine Appearance    Clear  (Clear)  


 


Urine pH    6.5  (4.6-8.0)  


 


Ur Specific Gravity    <=1.005  (1.005-1.030)  


 


Urine Protein    Negative  (Negative)  


 


Urine Glucose (UA)    Negative  (Negative)  mg/dL


 


Urine Ketones    Negative  (Negative)  


 


Urine Blood    Negative  (Negative)  


 


Urine Nitrite    Negative  (Negative)  


 


Urine Bilirubin    Negative  (Negative)  


 


Urine Urobilinogen    0.2  (0.2)  mg/dL


 


Ur Leukocyte Esterase    Negative  (Negative)  


 


U Hyaline Cast (Auto)    NONE SEEN  (0-2)  /LPF


 


Urine Microscopic RBC    0-2  (0-5)  /HPF


 


Urine Microscopic WBC    0-2  (0-5)  /HPF


 


Ur Epithelial Cells    None Seen  (None Seen)  /HPF


 


Urine Bacteria    None Seen  (None Seen)  /HPF


 


Urine Culture Reflexed    NO  (NO)  














  02/26/25 02/27/25 02/27/25 Range/Units





  22:14 04:44 04:44 


 


WBC   9.9   (3.98-10.04)  x10^3/uL


 


RBC   3.42 L   (3.93-5.22)  x10^6/uL


 


Hgb   9.5 L   (11.2-15.7)  g/dL


 


Hct   31.3 L   (34.1-44.9)  %


 


MCV   91.5   (79.4-94.8)  fL


 


MCH   27.8   (25.6-32.2)  pg


 


MCHC   30.4 L   (32.2-35.5)  g/dL


 


RDW   15.6 H   (11.7-14.4)  %


 


Plt Count   263   (182-369)  x10^3/uL


 


MPV   10.8   (9.4-12.3)  fL


 


Sodium    139  (135-145)  mmol/L


 


Potassium    3.7  (3.5-5.1)  mmol/L


 


Chloride    94 L  ()  mmol/L


 


Carbon Dioxide    39 H  (22-30)  mmol/L


 


Anion Gap    9.6  (5-15)  MEQ/L


 


BUN    21 H  (7-17)  mg/dL


 


Creatinine    0.74  (0.52-1.04)  mg/dL


 


Estimated GFR    89.2  ML/MIN


 


Glucose    199 H  ()  mg/dL


 


POC Glucometer  156 H    (74 to 106)  mg/dL


 


Hemoglobin A1c     (4.5-6.0)  %


 


Calcium    8.4  (8.4-10.2)  mg/dL


 


Magnesium     (1.6-2.3)  mg/dL


 


Total Bilirubin     (0.2-1.3)  mg/dL


 


AST     (14-36)  U/L


 


ALT     (0-35)  U/L


 


Alkaline Phosphatase     ()  U/L


 


Serum Total Protein     (6.3-8.2)  g/dL


 


Albumin     (3.5-5.0)  g/dL


 


Urine Color     (Yellow)  


 


Urine Appearance     (Clear)  


 


Urine pH     (4.6-8.0)  


 


Ur Specific Gravity     (1.005-1.030)  


 


Urine Protein     (Negative)  


 


Urine Glucose (UA)     (Negative)  mg/dL


 


Urine Ketones     (Negative)  


 


Urine Blood     (Negative)  


 


Urine Nitrite     (Negative)  


 


Urine Bilirubin     (Negative)  


 


Urine Urobilinogen     (0.2)  mg/dL


 


Ur Leukocyte Esterase     (Negative)  


 


U Hyaline Cast (Auto)     (0-2)  /LPF


 


Urine Microscopic RBC     (0-5)  /HPF


 


Urine Microscopic WBC     (0-5)  /HPF


 


Ur Epithelial Cells     (None Seen)  /HPF


 


Urine Bacteria     (None Seen)  /HPF


 


Urine Culture Reflexed     (NO)  














  02/27/25 Range/Units





  07:33 


 


WBC   (3.98-10.04)  x10^3/uL


 


RBC   (3.93-5.22)  x10^6/uL


 


Hgb   (11.2-15.7)  g/dL


 


Hct   (34.1-44.9)  %


 


MCV   (79.4-94.8)  fL


 


MCH   (25.6-32.2)  pg


 


MCHC   (32.2-35.5)  g/dL


 


RDW   (11.7-14.4)  %


 


Plt Count   (182-369)  x10^3/uL


 


MPV   (9.4-12.3)  fL


 


Sodium   (135-145)  mmol/L


 


Potassium   (3.5-5.1)  mmol/L


 


Chloride   ()  mmol/L


 


Carbon Dioxide   (22-30)  mmol/L


 


Anion Gap   (5-15)  MEQ/L


 


BUN   (7-17)  mg/dL


 


Creatinine   (0.52-1.04)  mg/dL


 


Estimated GFR   ML/MIN


 


Glucose   ()  mg/dL


 


POC Glucometer  172 H  (74 to 106)  mg/dL


 


Hemoglobin A1c   (4.5-6.0)  %


 


Calcium   (8.4-10.2)  mg/dL


 


Magnesium   (1.6-2.3)  mg/dL


 


Total Bilirubin   (0.2-1.3)  mg/dL


 


AST   (14-36)  U/L


 


ALT   (0-35)  U/L


 


Alkaline Phosphatase   ()  U/L


 


Serum Total Protein   (6.3-8.2)  g/dL


 


Albumin   (3.5-5.0)  g/dL


 


Urine Color   (Yellow)  


 


Urine Appearance   (Clear)  


 


Urine pH   (4.6-8.0)  


 


Ur Specific Gravity   (1.005-1.030)  


 


Urine Protein   (Negative)  


 


Urine Glucose (UA)   (Negative)  mg/dL


 


Urine Ketones   (Negative)  


 


Urine Blood   (Negative)  


 


Urine Nitrite   (Negative)  


 


Urine Bilirubin   (Negative)  


 


Urine Urobilinogen   (0.2)  mg/dL


 


Ur Leukocyte Esterase   (Negative)  


 


U Hyaline Cast (Auto)   (0-2)  /LPF


 


Urine Microscopic RBC   (0-5)  /HPF


 


Urine Microscopic WBC   (0-5)  /HPF


 


Ur Epithelial Cells   (None Seen)  /HPF


 


Urine Bacteria   (None Seen)  /HPF


 


Urine Culture Reflexed   (NO)  











Micro Results-Entire Visit: 





                                  Microbiology











 02/25/25 21:59 Blood Culture - Preliminary





 Blood 


 


 02/25/25 22:06 Blood Culture - Preliminary





 Blood 








                                   Accuchecks











Date                           02/27/25


 


Date                           02/26/25


 


Date                           02/26/25


 


Time                           07:37


 


Time                           16:30

















- Radiology Exams


Ordered Rad Exams-Entire Visit: 





                              Radiology Procedures











 Category Date Time Status


 


 ABDOMEN AND PELVIS W/0 CONTRAS [CT] Stat Exams  02/26/25 00:02 Completed


 


 CHEST 1 VIEW (PORTABLE) Stat Exams  02/25/25 23:18 Completed


 


 ECHO W/2D AND DOPPLER [US] Routine Exams  02/26/25 05:49 Taken














- Procedures and Test


Procedures and Tests throughout Hospitalization: 





                            Therapy Orders & Screens





02/25/25 22:02


Respiratory Therapy Assessment DAILY 


   Comment: 





02/26/25 04:50


Oxygen Nasal Cannula 3 lpm 


   Comment: 


   Diagnosis: COPD exacerbation, pneumonia, hypoxia


Respiratory Therapy Assessment DAILY 


   Comment: 


   Diagnosis: COPD exacerbation, pneumonia, hypoxia





02/26/25 07:30


ST Screen per Nursing Assess ONCE 


   Comment: Protocol Order


   Physician Instructions: Greater than 5 points order ST Admission Screening


   Reason For Exam: Triggered on Admission


   Diagnosis: COPD exacerbation, pneumonia, hypoxia


   CVA/Dysphagia/Aphasia: No


   Cognitive Deficits: No


   Dehydration/Nutrition Deficit: No


   Reflux: No


   Oral-Motor Difficulties: No


   Pneumonia: Yes


   Nursing Home Resident: No


   Total Points: 5





02/26/25 12:41


EKG ROUTINE 


   Comment: CHANGE IN TELE STRIP


   Diagnosis: COPD exacerbation, pneumonia, hypoxia


   EKG Reason: Other














Discharge Exam


General Appearance: no apparent distress, alert, obese


Neurologic Exam: alert, oriented x 3, cooperative, normal mood/affect, nml 

cerebellar function, sensation nml, No motor deficits


Eye Exam: PERRL, EOMI, eyes nml inspection


Ears, Nose, Throat Exam: normal ENT inspection, pharynx normal, moist mucous 

membranes


Neck Exam: normal inspection, non-tender, supple, full range of motion


Respiratory Exam: normal breath sounds, lungs clear, No respiratory distress


Cardiovascular Exam: regular rate/rhythm, normal heart sounds


Gastrointestinal/Abdomen Exam: soft, hernia, No tenderness, No mass


Pelvic Exam: deferred, other (large hital hernia)


Rectal Exam: deferred


Back Exam: normal inspection, normal range of motion, No CVA tenderness, No 

vertebral tenderness


Extremity Exam: normal inspection, normal range of motion


Skin Exam: normal color, warm, dry





Final Diagnosis/Problem List





- Final Discharge Diagnosis/Problem


(1) COPD exacerbation


Current Visit: Yes   Status: Acute   


Assessment & Plan: 


-Continue breathing therapy.  I will add Pulmicort


-Continue steroids


-Continue antibiotics


Code(s): J44.1 - CHRONIC OBSTRUCTIVE PULMONARY DISEASE W (ACUTE) EXACERBATION   





(2) Right lower lobe pulmonary infiltrate


Current Visit: Yes   Status: Acute   


Assessment & Plan: 


- Will check procalcitonin- negative- likley COPD, CHF


- Continue antibiotics for COPD


- Advised incentive spirometry


Code(s): R91.8 - OTHER NONSPECIFIC ABNORMAL FINDING OF LUNG FIELD   





(3) Acute on chronic respiratory failure with hypoxemia


Current Visit: No   Status: Acute   


Assessment & Plan: 


-Due to underlying COPD/pneumonia


-Tested -ve for influenza/Covid


-Troponin and D-dimer, unremarkable 


-Will check BNP- 2850


-will check echocardiogram- echo results pending will need to f/u OP with PCP


- no edema


-Continue oxygen supplements to keep sats more than 90% patient is currently on 

3 L oxygen


- At home she use 3 to 5 L only as per need


- on baseline O2 3lNC 98%





Code(s): J96.21 - ACUTE AND CHRONIC RESPIRATORY FAILURE WITH HYPOXIA   





(4) Restless leg


Current Visit: Yes   Status: Acute   


Assessment & Plan: 


- sinemet


- need to discuss OP with PCP








(5) Anxiety


Current Visit: No   Status: Chronic   


Assessment & Plan: 


- hydroxyzine PRN


Code(s): F41.9 - ANXIETY DISORDER, UNSPECIFIED   





(6) Hypertension


Current Visit: No   Status: Chronic   


Assessment & Plan: 


-Patient is on Coreg and hydralazine


-I will add nifedipine 30 mg daily for better blood pressure management- 

continue OP


-Hydralazine as nended for systolic more than 180





Code(s): I10 - ESSENTIAL (PRIMARY) HYPERTENSION   





(7) Obesity (BMI 30.0-34.9)


Current Visit: Yes   Status: Acute   


Assessment & Plan: 


- advised diet and exercise control


Code(s): E66.811 - OBESITY, CLASS 1   





(8) Constipation


Current Visit: Yes   Status: Resolved   


Assessment & Plan: 


- Continue Senokot and MiraLAX


-- 1 stat dose of mag citrate


- resolved


Code(s): K59.00 - CONSTIPATION, UNSPECIFIED   





- Discharge


Discharge Date: 02/27/25


Disposition: Home, Self-Care


Condition: Stable


Prescriptions: 


New


   Prednisone 20 mg*** [Deltasone 20 mg***] 20 mg PO BID 5 Days #10 tablet


   cefuroxime axetiL [Cefuroxime] 500 mg PO BID 7 Days #14 tablet


   Nifedipine Xl 30 mg*** [Adalat CC 30 MG TABLET***] 30 mg PO DAILY 30 Days #30

tablet





Continue


   Gabapentin 300 mg PO BID


   Escitalopram Oxalate** [Lexapro**] 20 mg PO DAILY


   Budesonide/Formoterol Fumarate [Symbicort 160-4.5 Mcg Inhaler] 6 gm IH BID


   Potassium Chloride Tab* [Klor Con] 20 meq PO BID


   Furosemide [Lasix] 40 mg PO BID


   Carvedilol 12.5 mg*** [Coreg 12.5 mg***] 12.5 mg PO BID


   Hydralazine HCl 10 mg PO TID


   Trazodone HCl 50 mg*** [Desyrel 50 mg***] 150 mg PO HS


   Polyethylene Glycol 3350 17 gm [Miralax Powder 17GM PACKET***] 17 gm PO DAILY


   Simvastatin 10 mg** [Zocor 10MG**] 10 mg PO QPM


   Hydroxyzine HCl 25 mg*** [Atarax 25 mg***] 1 tab PO TID PRN PRN


     PRN Reason: Anxiety


   Metformin HCl 500 mg*** [Glucophage 500 MG***] 500 mg PO DAILY


Follow up with: 


MARCELINO BELLA [Primary Care Provider] - 03/06/25 2:15 pm